# Patient Record
Sex: FEMALE | Race: WHITE | NOT HISPANIC OR LATINO | Employment: PART TIME | URBAN - NONMETROPOLITAN AREA
[De-identification: names, ages, dates, MRNs, and addresses within clinical notes are randomized per-mention and may not be internally consistent; named-entity substitution may affect disease eponyms.]

---

## 2018-05-14 ENCOUNTER — OFFICE VISIT CONVERTED (OUTPATIENT)
Dept: FAMILY MEDICINE CLINIC | Age: 33
End: 2018-05-14
Attending: NURSE PRACTITIONER

## 2018-07-16 ENCOUNTER — OFFICE VISIT CONVERTED (OUTPATIENT)
Dept: FAMILY MEDICINE CLINIC | Age: 33
End: 2018-07-16
Attending: NURSE PRACTITIONER

## 2018-08-08 ENCOUNTER — OFFICE VISIT CONVERTED (OUTPATIENT)
Dept: FAMILY MEDICINE CLINIC | Age: 33
End: 2018-08-08
Attending: NURSE PRACTITIONER

## 2018-08-15 ENCOUNTER — CONVERSION ENCOUNTER (OUTPATIENT)
Dept: OTHER | Facility: HOSPITAL | Age: 33
End: 2018-08-15

## 2019-01-18 ENCOUNTER — OFFICE VISIT CONVERTED (OUTPATIENT)
Dept: FAMILY MEDICINE CLINIC | Age: 34
End: 2019-01-18
Attending: NURSE PRACTITIONER

## 2019-01-18 ENCOUNTER — HOSPITAL ENCOUNTER (OUTPATIENT)
Dept: OTHER | Facility: HOSPITAL | Age: 34
Discharge: HOME | End: 2019-01-18
Attending: NURSE PRACTITIONER

## 2019-01-18 LAB — HCG INTACT+B SERPL-ACNC: ABNORMAL M[IU]/ML (ref 0–5)

## 2019-03-11 ENCOUNTER — OFFICE VISIT CONVERTED (OUTPATIENT)
Dept: FAMILY MEDICINE CLINIC | Age: 34
End: 2019-03-11
Attending: NURSE PRACTITIONER

## 2019-04-01 ENCOUNTER — HOSPITAL ENCOUNTER (OUTPATIENT)
Dept: OTHER | Facility: HOSPITAL | Age: 34
Discharge: HOME | End: 2019-04-01
Attending: NURSE PRACTITIONER

## 2019-04-01 ENCOUNTER — OFFICE VISIT CONVERTED (OUTPATIENT)
Dept: FAMILY MEDICINE CLINIC | Age: 34
End: 2019-04-01
Attending: NURSE PRACTITIONER

## 2019-04-01 LAB
ALBUMIN SERPL-MCNC: 4.5 G/DL (ref 3.5–5)
ALBUMIN/GLOB SERPL: 1.3 {RATIO} (ref 1.4–2.6)
ALP SERPL-CCNC: 63 U/L (ref 42–98)
ALT SERPL-CCNC: 16 U/L (ref 10–40)
ANION GAP SERPL CALC-SCNC: 19 MMOL/L (ref 8–19)
AST SERPL-CCNC: 15 U/L (ref 15–50)
BILIRUB SERPL-MCNC: 0.34 MG/DL (ref 0.2–1.3)
BUN SERPL-MCNC: 11 MG/DL (ref 5–25)
BUN/CREAT SERPL: 17 {RATIO} (ref 6–20)
CALCIUM SERPL-MCNC: 9.3 MG/DL (ref 8.7–10.4)
CHLORIDE SERPL-SCNC: 101 MMOL/L (ref 99–111)
CONV CO2: 24 MMOL/L (ref 22–32)
CONV TOTAL PROTEIN: 7.9 G/DL (ref 6.3–8.2)
CREAT UR-MCNC: 0.64 MG/DL (ref 0.5–0.9)
GFR SERPLBLD BASED ON 1.73 SQ M-ARVRAT: >60 ML/MIN/{1.73_M2}
GLOBULIN UR ELPH-MCNC: 3.4 G/DL (ref 2–3.5)
GLUCOSE SERPL-MCNC: 86 MG/DL (ref 65–99)
OSMOLALITY SERPL CALC.SUM OF ELEC: 289 MOSM/KG (ref 273–304)
POTASSIUM SERPL-SCNC: 4.2 MMOL/L (ref 3.5–5.3)
SODIUM SERPL-SCNC: 140 MMOL/L (ref 135–147)
TSH SERPL-ACNC: 1.45 M[IU]/L (ref 0.27–4.2)

## 2019-05-14 ENCOUNTER — HOSPITAL ENCOUNTER (OUTPATIENT)
Dept: OTHER | Facility: HOSPITAL | Age: 34
Discharge: HOME | End: 2019-05-14
Attending: FAMILY MEDICINE

## 2019-05-14 ENCOUNTER — OFFICE VISIT CONVERTED (OUTPATIENT)
Dept: FAMILY MEDICINE CLINIC | Age: 34
End: 2019-05-14
Attending: FAMILY MEDICINE

## 2019-05-16 LAB — BACTERIA SPEC AEROBE CULT: NORMAL

## 2019-10-01 ENCOUNTER — OFFICE VISIT CONVERTED (OUTPATIENT)
Dept: FAMILY MEDICINE CLINIC | Age: 34
End: 2019-10-01
Attending: NURSE PRACTITIONER

## 2019-11-19 ENCOUNTER — OFFICE VISIT CONVERTED (OUTPATIENT)
Dept: FAMILY MEDICINE CLINIC | Age: 34
End: 2019-11-19
Attending: NURSE PRACTITIONER

## 2020-01-28 ENCOUNTER — OFFICE VISIT CONVERTED (OUTPATIENT)
Dept: FAMILY MEDICINE CLINIC | Age: 35
End: 2020-01-28
Attending: NURSE PRACTITIONER

## 2020-03-10 ENCOUNTER — OFFICE VISIT CONVERTED (OUTPATIENT)
Dept: FAMILY MEDICINE CLINIC | Age: 35
End: 2020-03-10
Attending: NURSE PRACTITIONER

## 2020-03-10 ENCOUNTER — HOSPITAL ENCOUNTER (OUTPATIENT)
Dept: OTHER | Facility: HOSPITAL | Age: 35
Discharge: HOME | End: 2020-03-10
Attending: NURSE PRACTITIONER

## 2020-03-10 LAB
APPEARANCE UR: ABNORMAL
BACTERIA UR CULT: NORMAL
BACTERIA UR QL AUTO: ABNORMAL
BILIRUB UR QL: NEGATIVE
CASTS URNS QL MICRO: ABNORMAL /[LPF]
COLOR UR: YELLOW
CONV LEUKOCYTE ESTERASE: ABNORMAL
CONV UROBILINOGEN IN URINE BY AUTOMATED TEST STRIP: 0.2 {EHRLICHU}/DL (ref 0.1–1)
EPI CELLS #/AREA URNS HPF: ABNORMAL /[HPF]
GLUCOSE 24H UR-MCNC: NEGATIVE MG/DL
HGB UR QL STRIP: ABNORMAL
KETONES UR QL STRIP: NEGATIVE MG/DL
MUCOUS THREADS URNS QL MICRO: ABNORMAL
NITRITE UR-MCNC: NEGATIVE MG/ML
PH UR STRIP.AUTO: 7 [PH] (ref 5–8)
PROT UR-MCNC: NEGATIVE MG/DL
RBC # BLD AUTO: ABNORMAL /[HPF]
SP GR UR STRIP: 1.01 (ref 1–1.03)
SPECIMEN SOURCE: ABNORMAL
UNIDENT CRYS URNS QL MICRO: ABNORMAL /[HPF]
WBC #/AREA URNS HPF: ABNORMAL /[HPF]

## 2020-03-12 LAB — BACTERIA UR CULT: NORMAL

## 2020-08-07 ENCOUNTER — OFFICE VISIT CONVERTED (OUTPATIENT)
Dept: FAMILY MEDICINE CLINIC | Age: 35
End: 2020-08-07
Attending: FAMILY MEDICINE

## 2020-08-31 ENCOUNTER — OFFICE VISIT CONVERTED (OUTPATIENT)
Dept: FAMILY MEDICINE CLINIC | Age: 35
End: 2020-08-31
Attending: NURSE PRACTITIONER

## 2020-10-28 ENCOUNTER — OFFICE VISIT CONVERTED (OUTPATIENT)
Dept: FAMILY MEDICINE CLINIC | Age: 35
End: 2020-10-28
Attending: NURSE PRACTITIONER

## 2020-11-20 ENCOUNTER — OFFICE VISIT CONVERTED (OUTPATIENT)
Dept: FAMILY MEDICINE CLINIC | Age: 35
End: 2020-11-20
Attending: NURSE PRACTITIONER

## 2020-11-20 ENCOUNTER — HOSPITAL ENCOUNTER (OUTPATIENT)
Dept: OTHER | Facility: HOSPITAL | Age: 35
Discharge: HOME | End: 2020-11-20
Attending: NURSE PRACTITIONER

## 2020-11-20 LAB
25(OH)D3 SERPL-MCNC: 38.6 NG/ML (ref 30–100)
ALBUMIN SERPL-MCNC: 4.6 G/DL (ref 3.5–5)
ALBUMIN/GLOB SERPL: 1.5 {RATIO} (ref 1.4–2.6)
ALP SERPL-CCNC: 54 U/L (ref 42–98)
ALT SERPL-CCNC: 17 U/L (ref 10–40)
ANION GAP SERPL CALC-SCNC: 14 MMOL/L (ref 8–19)
AST SERPL-CCNC: 12 U/L (ref 15–50)
BASOPHILS # BLD AUTO: 0.03 10*3/UL (ref 0–0.2)
BASOPHILS NFR BLD AUTO: 0.4 % (ref 0–3)
BILIRUB SERPL-MCNC: 0.52 MG/DL (ref 0.2–1.3)
BUN SERPL-MCNC: 15 MG/DL (ref 5–25)
BUN/CREAT SERPL: 22 {RATIO} (ref 6–20)
CALCIUM SERPL-MCNC: 9.8 MG/DL (ref 8.7–10.4)
CHLORIDE SERPL-SCNC: 104 MMOL/L (ref 99–111)
CHOLEST SERPL-MCNC: 173 MG/DL (ref 107–200)
CHOLEST/HDLC SERPL: 5.2 {RATIO} (ref 3–6)
CONV ABS IMM GRAN: 0.03 10*3/UL (ref 0–0.2)
CONV CO2: 24 MMOL/L (ref 22–32)
CONV IMMATURE GRAN: 0.4 % (ref 0–1.8)
CONV TOTAL PROTEIN: 7.7 G/DL (ref 6.3–8.2)
CREAT UR-MCNC: 0.68 MG/DL (ref 0.5–0.9)
DEPRECATED RDW RBC AUTO: 38.5 FL (ref 36.4–46.3)
EOSINOPHIL # BLD AUTO: 0.11 10*3/UL (ref 0–0.7)
EOSINOPHIL # BLD AUTO: 1.4 % (ref 0–7)
ERYTHROCYTE [DISTWIDTH] IN BLOOD BY AUTOMATED COUNT: 12.4 % (ref 11.7–14.4)
GFR SERPLBLD BASED ON 1.73 SQ M-ARVRAT: >60 ML/MIN/{1.73_M2}
GLOBULIN UR ELPH-MCNC: 3.1 G/DL (ref 2–3.5)
GLUCOSE SERPL-MCNC: 92 MG/DL (ref 65–99)
HCT VFR BLD AUTO: 38 % (ref 37–47)
HDLC SERPL-MCNC: 33 MG/DL (ref 40–60)
HGB BLD-MCNC: 12.9 G/DL (ref 12–16)
LDLC SERPL CALC-MCNC: 113 MG/DL (ref 70–100)
LYMPHOCYTES # BLD AUTO: 2.17 10*3/UL (ref 1–5)
LYMPHOCYTES NFR BLD AUTO: 27.4 % (ref 20–45)
MCH RBC QN AUTO: 29.2 PG (ref 27–31)
MCHC RBC AUTO-ENTMCNC: 33.9 G/DL (ref 33–37)
MCV RBC AUTO: 86 FL (ref 81–99)
MONOCYTES # BLD AUTO: 0.52 10*3/UL (ref 0.2–1.2)
MONOCYTES NFR BLD AUTO: 6.6 % (ref 3–10)
NEUTROPHILS # BLD AUTO: 5.06 10*3/UL (ref 2–8)
NEUTROPHILS NFR BLD AUTO: 63.8 % (ref 30–85)
NRBC CBCN: 0 % (ref 0–0.7)
OSMOLALITY SERPL CALC.SUM OF ELEC: 286 MOSM/KG (ref 273–304)
PLATELET # BLD AUTO: 240 10*3/UL (ref 130–400)
PMV BLD AUTO: 10.9 FL (ref 9.4–12.3)
POTASSIUM SERPL-SCNC: 3.8 MMOL/L (ref 3.5–5.3)
RBC # BLD AUTO: 4.42 10*6/UL (ref 4.2–5.4)
SODIUM SERPL-SCNC: 138 MMOL/L (ref 135–147)
TRIGL SERPL-MCNC: 137 MG/DL (ref 40–150)
TSH SERPL-ACNC: 1.7 M[IU]/L (ref 0.27–4.2)
VIT B12 SERPL-MCNC: 331 PG/ML (ref 211–911)
VLDLC SERPL-MCNC: 27 MG/DL (ref 5–37)
WBC # BLD AUTO: 7.92 10*3/UL (ref 4.8–10.8)

## 2020-11-23 LAB
CONV EBV EARLY ANTIGEN: <5 U/ML (ref 0–10.9)
CONV EBV NUCLEAR ANTIGEN: 308 U/ML (ref 0–21.9)
CONV EPSTEIN BARR VIRAL CAPSID IGM: <10 U/ML (ref 0–43.9)
CONV EPSTEIN BARR VIRUS ANTIBODY TO VIRAL CAPSID IGG: >750 U/ML (ref 0–21.9)

## 2020-12-15 ENCOUNTER — OFFICE VISIT CONVERTED (OUTPATIENT)
Dept: FAMILY MEDICINE CLINIC | Age: 35
End: 2020-12-15
Attending: NURSE PRACTITIONER

## 2020-12-18 ENCOUNTER — HOSPITAL ENCOUNTER (OUTPATIENT)
Dept: OTHER | Facility: HOSPITAL | Age: 35
Discharge: HOME | End: 2020-12-18
Attending: NURSE PRACTITIONER

## 2020-12-29 ENCOUNTER — HOSPITAL ENCOUNTER (OUTPATIENT)
Dept: OTHER | Facility: HOSPITAL | Age: 35
Discharge: HOME | End: 2020-12-29
Attending: NURSE PRACTITIONER

## 2020-12-31 ENCOUNTER — OFFICE VISIT CONVERTED (OUTPATIENT)
Dept: OTOLARYNGOLOGY | Facility: CLINIC | Age: 35
End: 2020-12-31
Attending: OTOLARYNGOLOGY

## 2021-01-04 ENCOUNTER — OFFICE VISIT CONVERTED (OUTPATIENT)
Dept: FAMILY MEDICINE CLINIC | Age: 36
End: 2021-01-04
Attending: NURSE PRACTITIONER

## 2021-01-04 ENCOUNTER — HOSPITAL ENCOUNTER (OUTPATIENT)
Dept: OTHER | Facility: HOSPITAL | Age: 36
Discharge: HOME | End: 2021-01-04
Attending: NURSE PRACTITIONER

## 2021-01-04 LAB
ALBUMIN SERPL-MCNC: 4.8 G/DL (ref 3.5–5)
ALBUMIN/GLOB SERPL: 1.7 {RATIO} (ref 1.4–2.6)
ALP SERPL-CCNC: 65 U/L (ref 42–98)
ALT SERPL-CCNC: 11 U/L (ref 10–40)
ANION GAP SERPL CALC-SCNC: 16 MMOL/L (ref 8–19)
AST SERPL-CCNC: 12 U/L (ref 15–50)
BASOPHILS # BLD AUTO: 0.04 10*3/UL (ref 0–0.2)
BASOPHILS NFR BLD AUTO: 0.4 % (ref 0–3)
BILIRUB SERPL-MCNC: 0.44 MG/DL (ref 0.2–1.3)
BUN SERPL-MCNC: 10 MG/DL (ref 5–25)
BUN/CREAT SERPL: 15 {RATIO} (ref 6–20)
CALCIUM SERPL-MCNC: 9.7 MG/DL (ref 8.7–10.4)
CHLORIDE SERPL-SCNC: 100 MMOL/L (ref 99–111)
CONV ABS IMM GRAN: 0.03 10*3/UL (ref 0–0.2)
CONV CO2: 27 MMOL/L (ref 22–32)
CONV IMMATURE GRAN: 0.3 % (ref 0–1.8)
CONV TOTAL PROTEIN: 7.7 G/DL (ref 6.3–8.2)
CREAT UR-MCNC: 0.67 MG/DL (ref 0.5–0.9)
D DIMER PPP FEU-MCNC: 0.19 MG/L (ref 0–0.59)
DEPRECATED RDW RBC AUTO: 38.7 FL (ref 36.4–46.3)
EOSINOPHIL # BLD AUTO: 0.08 10*3/UL (ref 0–0.7)
EOSINOPHIL # BLD AUTO: 0.8 % (ref 0–7)
ERYTHROCYTE [DISTWIDTH] IN BLOOD BY AUTOMATED COUNT: 12.3 % (ref 11.7–14.4)
GFR SERPLBLD BASED ON 1.73 SQ M-ARVRAT: >60 ML/MIN/{1.73_M2}
GLOBULIN UR ELPH-MCNC: 2.9 G/DL (ref 2–3.5)
GLUCOSE SERPL-MCNC: 85 MG/DL (ref 65–99)
HCT VFR BLD AUTO: 39.7 % (ref 37–47)
HGB BLD-MCNC: 13.4 G/DL (ref 12–16)
LYMPHOCYTES # BLD AUTO: 2.43 10*3/UL (ref 1–5)
LYMPHOCYTES NFR BLD AUTO: 25.3 % (ref 20–45)
MCH RBC QN AUTO: 29.1 PG (ref 27–31)
MCHC RBC AUTO-ENTMCNC: 33.8 G/DL (ref 33–37)
MCV RBC AUTO: 86.1 FL (ref 81–99)
MONOCYTES # BLD AUTO: 0.66 10*3/UL (ref 0.2–1.2)
MONOCYTES NFR BLD AUTO: 6.9 % (ref 3–10)
NEUTROPHILS # BLD AUTO: 6.36 10*3/UL (ref 2–8)
NEUTROPHILS NFR BLD AUTO: 66.3 % (ref 30–85)
NRBC CBCN: 0 % (ref 0–0.7)
OSMOLALITY SERPL CALC.SUM OF ELEC: 286 MOSM/KG (ref 273–304)
PLATELET # BLD AUTO: 253 10*3/UL (ref 130–400)
PMV BLD AUTO: 10.8 FL (ref 9.4–12.3)
POTASSIUM SERPL-SCNC: 3.8 MMOL/L (ref 3.5–5.3)
RBC # BLD AUTO: 4.61 10*6/UL (ref 4.2–5.4)
SODIUM SERPL-SCNC: 139 MMOL/L (ref 135–147)
WBC # BLD AUTO: 9.6 10*3/UL (ref 4.8–10.8)

## 2021-01-05 ENCOUNTER — HOSPITAL ENCOUNTER (OUTPATIENT)
Dept: OTHER | Facility: HOSPITAL | Age: 36
Discharge: HOME | End: 2021-01-05
Attending: NURSE PRACTITIONER

## 2021-01-12 ENCOUNTER — OFFICE VISIT CONVERTED (OUTPATIENT)
Dept: FAMILY MEDICINE CLINIC | Age: 36
End: 2021-01-12
Attending: FAMILY MEDICINE

## 2021-02-08 ENCOUNTER — LAB (OUTPATIENT)
Dept: LAB | Facility: HOSPITAL | Age: 36
End: 2021-02-08

## 2021-02-08 ENCOUNTER — OFFICE VISIT (OUTPATIENT)
Dept: INTERNAL MEDICINE | Facility: CLINIC | Age: 36
End: 2021-02-08

## 2021-02-08 DIAGNOSIS — I10 BENIGN ESSENTIAL HYPERTENSION: ICD-10-CM

## 2021-02-08 DIAGNOSIS — M25.50 ARTHRALGIA OF MULTIPLE SITES: Primary | ICD-10-CM

## 2021-02-08 DIAGNOSIS — E66.3 OVERWEIGHT WITH BODY MASS INDEX (BMI) OF 29 TO 29.9 IN ADULT: ICD-10-CM

## 2021-02-08 DIAGNOSIS — E04.1 THYROID CYST: Chronic | ICD-10-CM

## 2021-02-08 DIAGNOSIS — U07.1 COVID-19 VIRUS INFECTION: ICD-10-CM

## 2021-02-08 DIAGNOSIS — Z00.00 ENCOUNTER FOR MEDICAL EXAMINATION TO ESTABLISH CARE: ICD-10-CM

## 2021-02-08 DIAGNOSIS — M54.2 CERVICALGIA: Chronic | ICD-10-CM

## 2021-02-08 DIAGNOSIS — O24.410 DIET CONTROLLED GESTATIONAL DIABETES MELLITUS (GDM), ANTEPARTUM: ICD-10-CM

## 2021-02-08 DIAGNOSIS — M25.50 ARTHRALGIA OF MULTIPLE SITES: ICD-10-CM

## 2021-02-08 LAB — HBA1C MFR BLD: 5.3 % (ref 4.8–5.6)

## 2021-02-08 PROCEDURE — 86038 ANTINUCLEAR ANTIBODIES: CPT

## 2021-02-08 PROCEDURE — 84550 ASSAY OF BLOOD/URIC ACID: CPT

## 2021-02-08 PROCEDURE — 36415 COLL VENOUS BLD VENIPUNCTURE: CPT

## 2021-02-08 PROCEDURE — 86376 MICROSOMAL ANTIBODY EACH: CPT

## 2021-02-08 PROCEDURE — 82306 VITAMIN D 25 HYDROXY: CPT

## 2021-02-08 PROCEDURE — 86431 RHEUMATOID FACTOR QUANT: CPT

## 2021-02-08 PROCEDURE — 99205 OFFICE O/P NEW HI 60 MIN: CPT | Performed by: INTERNAL MEDICINE

## 2021-02-08 PROCEDURE — 86618 LYME DISEASE ANTIBODY: CPT

## 2021-02-08 PROCEDURE — 86747 PARVOVIRUS ANTIBODY: CPT

## 2021-02-08 PROCEDURE — 86769 SARS-COV-2 COVID-19 ANTIBODY: CPT

## 2021-02-08 PROCEDURE — 85652 RBC SED RATE AUTOMATED: CPT

## 2021-02-08 PROCEDURE — 83036 HEMOGLOBIN GLYCOSYLATED A1C: CPT

## 2021-02-08 RX ORDER — HYDROCHLOROTHIAZIDE 25 MG/1
1 TABLET ORAL DAILY
COMMUNITY
Start: 2020-12-04 | End: 2021-08-30 | Stop reason: SDUPTHER

## 2021-02-08 RX ORDER — IBUPROFEN 800 MG/1
TABLET ORAL AS NEEDED
COMMUNITY
Start: 2021-01-21 | End: 2021-11-18 | Stop reason: SDUPTHER

## 2021-02-08 RX ORDER — CYCLOBENZAPRINE HCL 5 MG
TABLET ORAL AS NEEDED
COMMUNITY
Start: 2021-01-22 | End: 2021-11-02

## 2021-02-08 NOTE — PROGRESS NOTES
"Ayesha Patel  1985  [id number]  Patient Care Team:  Monserrat Reina MD as PCP - Internal Medicine (Internal Medicine)    Ayesha Patel is a 35 y.o. here today to establish care.      Previously under the care of a doctor in [place].    Chief Complaint   Patient presents with   • Establish Care   • Pain     Has \"achy, sharp\" pain all over her body off and on since having COVID in October     HPI  States she had Covid in October for couple weeks-sinus pressure, loss of taste and smell,headache, myalgias and arthralgias and some fatigue then got over it. Few weeks later she felt bad again and had \"knot\" on L neck and felt lymph nodes were swollen. US showed small LNs. Also showed cyst on thyroid and saw ENT in [place].  Work-up reportedly benign.  She is concerned because she has a friend with lymphoma that was diagnosed from a  \"knot\" on shoulder.     Started having worse bilateral neck pain in December that started posteriorly and radiated anteriorly then down to shoulders and uppse arms.  Now pain \"extends to her whole body.\"  She states that she has had neck pain for many years.  Saw oncologist Dr. Menjivar in [place]. Had CT that showed 2 little lymph nodes  In lungs but nothing else.  He did an extensive lab work-up which was all normal.    Diffuse arthralgias -mainly posterior neck/cjym3dehyi/collar bone/shins/ankles/wrists--now waxes  and wanes but  always there.  Only helped by hot bath or moist heat packs.  He also finds Flexeril helpful when the pain is worse.    HPI:   BPs at home 136/86 to 142/90 with wrist cuff at home. Takes HCTZ for years. She reports being on lisinopril in the past which caused intususseption about 10 years ago. .     HPI  Long history of neck pain and had PT for it in the past. Flexeril helps some, but only uses it occasionally for severe pain.       Past Medical History:   Diagnosis Date   • Hypertension      History reviewed. No pertinent surgical " "history.  Family History   Problem Relation Age of Onset   • Hypertension Mother    • Heart disease Maternal Uncle    • Heart disease Maternal Grandmother    • Prostate cancer Paternal Grandfather         prostate   • Hyperlipidemia Father    • Prostate cancer Paternal Uncle         several P.Uncles   • Rheum arthritis Paternal Aunt         and giant cell arteritis   • Prostate cancer Paternal Uncle      Social History     Tobacco Use   Smoking Status Never Smoker   Smokeless Tobacco Never Used     Allergies   Allergen Reactions   • Lisinopril GI Intolerance     \"flipped her intestines inside out and hospitalized her\"       Current Outpatient Medications:   •  cyclobenzaprine (FLEXERIL) 5 MG tablet, As Needed., Disp: , Rfl:   •  hydroCHLOROthiazide (HYDRODIURIL) 25 MG tablet, Take 1 tablet by mouth Daily., Disp: , Rfl:   •  ibuprofen (ADVIL,MOTRIN) 800 MG tablet, As Needed., Disp: , Rfl:   •  cholecalciferol (Vitamin D, Cholecalciferol,) 25 MCG (1000 UT) tablet, Take 1 tablet by mouth Daily., Disp: 90 tablet, Rfl: 3  •  Pediatric Multivitamins-Iron (Flintstones w/Iron) 18 MG chewable tablet, Chew 2 tablets Daily., Disp: 180 tablet, Rfl: 3  •  vitamin B-12 (CYANOCOBALAMIN) 1000 MCG tablet, Take 1 tablet by mouth Daily., Disp: 90 tablet, Rfl: 1    Review of Systems   Constitutional: Negative for chills, fatigue, fever, unexpected weight gain and unexpected weight loss.   HENT: Negative for sore throat and trouble swallowing.    Eyes: Negative for visual disturbance.   Respiratory: Negative for cough, shortness of breath and wheezing.    Cardiovascular: Negative for chest pain, palpitations and leg swelling.   Gastrointestinal: Negative for abdominal pain, blood in stool, constipation and diarrhea.   Endocrine: Negative for cold intolerance and heat intolerance.   Genitourinary: Negative for dysuria, frequency and urinary incontinence.   Musculoskeletal: Positive for arthralgias and neck pain. Negative for back pain, " "gait problem and joint swelling.   Skin: Negative for rash and skin lesions.   Allergic/Immunologic: Negative for immunocompromised state.   Neurological: Negative for dizziness, weakness, numbness and headache.   Hematological: Negative for adenopathy. Does not bruise/bleed easily.   Psychiatric/Behavioral: Negative for sleep disturbance, suicidal ideas and depressed mood. The patient is not nervous/anxious.        /90   Pulse 94   Temp 97.7 °F (36.5 °C) (Infrared)   Ht 166.4 cm (65.5\")   Wt 82.9 kg (182 lb 12.8 oz)   Breastfeeding No   BMI 29.96 kg/m²          Physical Exam  Vitals signs and nursing note reviewed.   Constitutional:       Appearance: She is well-developed and overweight.       HENT:      Head: Normocephalic.   Eyes:      Conjunctiva/sclera: Conjunctivae normal.      Pupils: Pupils are equal, round, and reactive to light.   Neck:      Musculoskeletal: Normal range of motion and neck supple.      Thyroid: No thyromegaly.      Vascular: No carotid bruit.   Cardiovascular:      Rate and Rhythm: Normal rate and regular rhythm.      Heart sounds: Normal heart sounds.   Pulmonary:      Effort: Pulmonary effort is normal.      Breath sounds: Normal breath sounds. No wheezing.   Abdominal:      General: Bowel sounds are normal.      Palpations: Abdomen is soft.      Tenderness: There is no abdominal tenderness.   Musculoskeletal: Normal range of motion.      Cervical back: She exhibits tenderness, deformity and spasm.        Back:    Lymphadenopathy:      Head:      Right side of head: No submental, submandibular, tonsillar, preauricular, posterior auricular or occipital adenopathy.      Left side of head: No submental, submandibular, tonsillar, preauricular, posterior auricular or occipital adenopathy.      Cervical: No cervical adenopathy.      Upper Body:      Right upper body: No supraclavicular adenopathy.      Left upper body: No supraclavicular adenopathy.   Skin:     General: Skin is " warm and dry.      Findings: No rash.   Neurological:      Mental Status: She is alert and oriented to person, place, and time.      Cranial Nerves: No cranial nerve deficit.      Sensory: No sensory deficit.      Coordination: Coordination normal.      Gait: Gait normal.   Psychiatric:         Attention and Perception: Attention normal.         Mood and Affect: Mood and affect normal.         Speech: Speech normal.         Behavior: Behavior normal.         Thought Content: Thought content normal.         Cognition and Memory: Cognition normal.         Judgment: Judgment normal.         Results Review:  I reviewed the patient's other test results and agree with the interpretation I reviewed the patient's labs that she brought with her in detail.  Extensive work-up has been done including thyroid, EBV, serum protein electrophoresis, D-dimer, hepatitis panel.    All labs were normal except for LDL at 113 and HDL at 33.  Ferritin level was a bit low at 42.  B12 was in the lower part of the normal range at 305.    Assessment/Plan:  Patient Instructions   Problem List Items Addressed This Visit        Cardiac and Vasculature    Benign essential hypertension    Overview     2/11/2021 Monserrat Reina MD    Continue hydrochlorothiazide daily for now.  She will check blood pressures at home at different times of the day and record the values to bring to the next appointment.    Decrease salt in the diet.  Avoid sodas.  Increase regular exercise.         Relevant Medications    hydroCHLOROthiazide (HYDRODIURIL) 25 MG tablet       Endocrine and Metabolic    Thyroid cyst (Chronic)    Overview     2/11/2021 Monserrat Reina MD    Evaluated by an ENT doctor in [place] and found to be benign.  Ultrasound revealed 0.5 cm colloid cyst.    We will continue to follow thyroid labs.         Diet controlled gestational diabetes mellitus (GDM), antepartum    Overview     2/11/2021 Monserrat Reina MD    We will monitor blood  sugars closely.  Check A1c today.    Losing weight and exercising regularly will help prevent development of type 2 diabetes.            Musculoskeletal and Injuries    Cervicalgia (Chronic)    Overview     2/8/2021 Monserrat Reina MD    Past C-spine x-ray revealed degenerative disc disease at C5-6.    Patient was advised on doing neck stretches and exercises throughout the day.  Practice good posture.  Use moist heat to relax tight muscles.    Consider referral to chiropractor and/or physical therapy.         Arthralgia of multiple sites - Primary    Overview     2/11/2021 Monserrat Reina MD    Extensive work-up by previous doctor was unrevealing.    Check additional labs today including Parvovirus B19, rheumatoid factor, MONTRELL, Lyme disease titers, sed rate.    May continue Flexeril and ibuprofen as needed.  Take ibuprofen with food to prevent GI upset.    Increase regular exercise and physical activity.         Relevant Orders    Thyroid Peroxidase Antibody (Completed)    Sedimentation Rate (Completed)    Vitamin D 25 Hydroxy (Completed)    Uric Acid (Completed)    MONTRELL With / DsDNA, RNP, Sjogrens A / B, Hawk (Completed)    Rheumatoid Factor (Completed)    Parvovirus B19 Antibody, IgG & IgM    Lyme, Total Antibody Test / Reflex (Completed)       Other    COVID-19 virus infection    Overview     2/11/2021 Monserrat Reina MD    Presumed COVID-19 infection in October.  Her  was ill and tested positive.  She was never tested but had some typical symptoms including headache, myalgias and arthralgias, fatigue, loss of sense of taste and smell.    Check Covid antibodies today..         Relevant Orders    SARS-CoV-2 Antibody, IgM (Completed)    Overweight with body mass index (BMI) of 29 to 29.9 in adult    Overview     2/11/2021 Monserrat Reina MD    Decrease sugars and fats and snack foods in the diet.  Smaller portions at mealtime.    Increase regular exercise and physical activity.    Weigh once a week   to monitor progress.         Relevant Orders    Hemoglobin A1c (Completed)      Other Visit Diagnoses     Encounter for medical examination to establish care                 Diagnosis Plan   1. Arthralgia of multiple sites  Thyroid Peroxidase Antibody    Sedimentation Rate    Vitamin D 25 Hydroxy    Uric Acid    MONTRELL With / DsDNA, RNP, Sjogrens A / B, Smith    Rheumatoid Factor    Parvovirus B19 Antibody, IgG & IgM    Lyme, Total Antibody Test / Reflex   2. Cervicalgia     3. Benign essential hypertension     4. Overweight with body mass index (BMI) of 29 to 29.9 in adult  Hemoglobin A1c   5. Thyroid cyst     6. Diet controlled gestational diabetes mellitus (GDM), antepartum     7. COVID-19 virus infection  SARS-CoV-2 Antibody, IgM   8. Encounter for medical examination to establish care         Patient Instructions     Patient Instructions   Problem List Items Addressed This Visit        Cardiac and Vasculature    Benign essential hypertension    Overview     2/11/2021 Monserrat Reina MD    Continue hydrochlorothiazide daily for now.  She will check blood pressures at home at different times of the day and record the values to bring to the next appointment.    Decrease salt in the diet.  Avoid sodas.  Increase regular exercise.         Relevant Medications    hydroCHLOROthiazide (HYDRODIURIL) 25 MG tablet       Endocrine and Metabolic    Thyroid cyst (Chronic)    Overview     2/11/2021 Monserrat Reina MD    Evaluated by an ENT doctor in [place] and found to be benign.  Ultrasound revealed 0.5 cm colloid cyst.    We will continue to follow thyroid labs.         Diet controlled gestational diabetes mellitus (GDM), antepartum    Overview     2/11/2021 Monserrat Reina MD    We will monitor blood sugars closely.  Check A1c today.    Losing weight and exercising regularly will help prevent development of type 2 diabetes.            Musculoskeletal and Injuries    Cervicalgia (Chronic)    Overview     2/8/2021  Monserrat Reina MD    Past C-spine x-ray revealed degenerative disc disease at C5-6.    Patient was advised on doing neck stretches and exercises throughout the day.  Practice good posture.  Use moist heat to relax tight muscles.    Consider referral to chiropractor and/or physical therapy.         Arthralgia of multiple sites - Primary    Overview     2/11/2021 Monserrat Reina MD    Extensive work-up by previous doctor was unrevealing.    Check additional labs today including Parvovirus B19, rheumatoid factor, MONTRELL, Lyme disease titers, sed rate.    May continue Flexeril and ibuprofen as needed.  Take ibuprofen with food to prevent GI upset.    Increase regular exercise and physical activity.         Relevant Orders    Thyroid Peroxidase Antibody (Completed)    Sedimentation Rate (Completed)    Vitamin D 25 Hydroxy (Completed)    Uric Acid (Completed)    MONTRELL With / DsDNA, RNP, Sjogrens A / B, Hawk (Completed)    Rheumatoid Factor (Completed)    Parvovirus B19 Antibody, IgG & IgM    Lyme, Total Antibody Test / Reflex (Completed)       Other    COVID-19 virus infection    Overview     2/11/2021 Monserrat Reina MD    Presumed COVID-19 infection in October.  Her  was ill and tested positive.  She was never tested but had some typical symptoms including headache, myalgias and arthralgias, fatigue, loss of sense of taste and smell.    Check Covid antibodies today..         Relevant Orders    SARS-CoV-2 Antibody, IgM (Completed)    Overweight with body mass index (BMI) of 29 to 29.9 in adult    Overview     2/11/2021 Monserrat Reina MD    Decrease sugars and fats and snack foods in the diet.  Smaller portions at mealtime.    Increase regular exercise and physical activity.    Weigh once a week  to monitor progress.         Relevant Orders    Hemoglobin A1c (Completed)      Other Visit Diagnoses     Encounter for medical examination to establish care                     Screenings due include  Pap smear (she  will schedule)..  Immunizations recommended are none, she is up-to-date.  Routine labs are due none.    Plan of care reviewed with patient at the conclusion of today's visit. Education was provided regarding diagnosis and management.  Patient verbalizes understanding of and agreement with management plan.    Return in about 3 weeks (around 3/1/2021) for Recheck.     Time spent with this patient, reviewing past labs and radiology studies, counseling, ordering labs, appropriate physical exam, and documenting in the chart, was 61 minutes.    * Please note that portions of this note were completed with a voice recognition program. Efforts were made to edit the dictation but occasionally words are transcribed.     Monserrat Reina MD

## 2021-02-09 LAB
25(OH)D3 SERPL-MCNC: 45.3 NG/ML (ref 30–100)
CHROMATIN AB SERPL-ACNC: <10 IU/ML (ref 0–14)
ERYTHROCYTE [SEDIMENTATION RATE] IN BLOOD: 14 MM/HR (ref 0–20)
URATE SERPL-MCNC: 4.8 MG/DL (ref 2.4–5.7)

## 2021-02-10 LAB
ANA SER QL: NEGATIVE
B BURGDOR IGG+IGM SER-ACNC: <0.91 ISR (ref 0–0.9)
SARS-COV-2 IGM SERPL QL IA: NEGATIVE
THYROPEROXIDASE AB SERPL-ACNC: 9 IU/ML (ref 0–34)

## 2021-02-11 VITALS
DIASTOLIC BLOOD PRESSURE: 90 MMHG | BODY MASS INDEX: 29.38 KG/M2 | HEART RATE: 94 BPM | SYSTOLIC BLOOD PRESSURE: 136 MMHG | HEIGHT: 66 IN | WEIGHT: 182.8 LBS | TEMPERATURE: 97.7 F

## 2021-02-11 PROBLEM — E04.1 THYROID CYST: Chronic | Status: ACTIVE | Noted: 2021-02-11

## 2021-02-11 PROBLEM — U07.1 COVID-19 VIRUS INFECTION: Status: ACTIVE | Noted: 2021-02-11

## 2021-02-11 PROBLEM — M25.50 ARTHRALGIA OF MULTIPLE SITES: Status: ACTIVE | Noted: 2021-02-11

## 2021-02-11 PROBLEM — E66.3 OVERWEIGHT WITH BODY MASS INDEX (BMI) OF 29 TO 29.9 IN ADULT: Status: ACTIVE | Noted: 2021-02-11

## 2021-02-11 PROBLEM — O24.410 DIET CONTROLLED GESTATIONAL DIABETES MELLITUS (GDM), ANTEPARTUM: Status: ACTIVE | Noted: 2021-02-11

## 2021-02-11 LAB
B19V IGG SER IA-ACNC: 3.5 INDEX (ref 0–0.8)
B19V IGM SER IA-ACNC: 0.1 INDEX (ref 0–0.8)

## 2021-02-11 RX ORDER — MELATONIN
1000 DAILY
Qty: 90 TABLET | Refills: 3
Start: 2021-02-11 | End: 2021-11-02

## 2021-02-11 RX ORDER — PEDI MULTIVIT 17/IRON FUMARATE 15 MG
2 TABLET,CHEWABLE ORAL DAILY
Qty: 180 TABLET | Refills: 3
Start: 2021-02-11 | End: 2021-11-02

## 2021-02-11 RX ORDER — LANOLIN ALCOHOL/MO/W.PET/CERES
1000 CREAM (GRAM) TOPICAL DAILY
Qty: 90 TABLET | Refills: 1
Start: 2021-02-11 | End: 2021-11-02

## 2021-02-11 NOTE — PATIENT INSTRUCTIONS
Patient Instructions   Problem List Items Addressed This Visit        Cardiac and Vasculature    Benign essential hypertension    Overview     2/11/2021 Monserrat Reina MD    Continue hydrochlorothiazide daily for now.  She will check blood pressures at home at different times of the day and record the values to bring to the next appointment.    Decrease salt in the diet.  Avoid sodas.  Increase regular exercise.         Relevant Medications    hydroCHLOROthiazide (HYDRODIURIL) 25 MG tablet       Endocrine and Metabolic    Thyroid cyst (Chronic)    Overview     2/11/2021 Monserrat Reina MD    Evaluated by an ENT doctor in [place] and found to be benign.  Ultrasound revealed 0.5 cm colloid cyst.    We will continue to follow thyroid labs.         Diet controlled gestational diabetes mellitus (GDM), antepartum    Overview     2/11/2021 Monserrat Reina MD    We will monitor blood sugars closely.  Check A1c today.    Losing weight and exercising regularly will help prevent development of type 2 diabetes.            Musculoskeletal and Injuries    Cervicalgia (Chronic)    Overview     2/8/2021 Monserrat Reina MD    Past C-spine x-ray revealed degenerative disc disease at C5-6.    Patient was advised on doing neck stretches and exercises throughout the day.  Practice good posture.  Use moist heat to relax tight muscles.    Consider referral to chiropractor and/or physical therapy.         Arthralgia of multiple sites - Primary    Overview     2/11/2021 Monserrat Reina MD    Extensive work-up by previous doctor was unrevealing.    Check additional labs today including Parvovirus B19, rheumatoid factor, MONTRELL, Lyme disease titers, sed rate.    May continue Flexeril and ibuprofen as needed.  Take ibuprofen with food to prevent GI upset.    Increase regular exercise and physical activity.         Relevant Orders    Thyroid Peroxidase Antibody (Completed)    Sedimentation Rate (Completed)    Vitamin D 25 Hydroxy  (Completed)    Uric Acid (Completed)    MONTRELL With / DsDNA, RNP, Sjogrens A / B, Hawk (Completed)    Rheumatoid Factor (Completed)    Parvovirus B19 Antibody, IgG & IgM    Lyme, Total Antibody Test / Reflex (Completed)       Other    COVID-19 virus infection    Overview     2/11/2021 Monserrat Reina MD    Presumed COVID-19 infection in October.  Her  was ill and tested positive.  She was never tested but had some typical symptoms including headache, myalgias and arthralgias, fatigue, loss of sense of taste and smell.    Check Covid antibodies today..         Relevant Orders    SARS-CoV-2 Antibody, IgM (Completed)    Overweight with body mass index (BMI) of 29 to 29.9 in adult    Overview     2/11/2021 Monserrat Reina MD    Decrease sugars and fats and snack foods in the diet.  Smaller portions at mealtime.    Increase regular exercise and physical activity.    Weigh once a week  to monitor progress.         Relevant Orders    Hemoglobin A1c (Completed)      Other Visit Diagnoses     Encounter for medical examination to establish care                 BMI for Adults  What is BMI?  Body mass index (BMI) is a number that is calculated from a person's weight and height. BMI can help estimate how much of a person's weight is composed of fat. BMI does not measure body fat directly. Rather, it is an alternative to procedures that directly measure body fat, which can be difficult and expensive.  BMI can help identify people who may be at higher risk for certain medical problems.  What are BMI measurements used for?  BMI is used as a screening tool to identify possible weight problems. It helps determine whether a person is obese, overweight, a healthy weight, or underweight.  BMI is useful for:  · Identifying a weight problem that may be related to a medical condition or may increase the risk for medical problems.  · Promoting changes, such as changes in diet and exercise, to help reach a healthy weight. BMI  "screening can be repeated to see if these changes are working.  How is BMI calculated?  BMI involves measuring your weight in relation to your height. Both height and weight are measured, and the BMI is calculated from those numbers. This can be done either in English (U[place] or metric measurements. Note that charts and online BMI calculators are available to help you find your BMI quickly and easily without having to do these calculations yourself.  To calculate your BMI in English (U.S[place] measurements:    1. Measure your weight in pounds (lb).  2. Multiply the number of pounds by 703.  ? For example, for a person who weighs 180 lb, multiply that number by 703, which equals 126,540.  3. Measure your height in inches. Then multiply that number by itself to get a measurement called \"inches squared.\"  ? For example, for a person who is 70 inches tall, the \"inches squared\" measurement is 70 inches x 70 inches, which equals 4,900 inches squared.  4. Divide the total from step 2 (number of lb x 703) by the total from step 3 (inches squared): 126,540 ÷ 4,900 = 25.8. This is your BMI.  To calculate your BMI in metric measurements:  1. Measure your weight in kilograms (kg).  2. Measure your height in meters (m). Then multiply that number by itself to get a measurement called \"meters squared.\"  ? For example, for a person who is 1.75 m tall, the \"meters squared\" measurement is 1.75 m x 1.75 m, which is equal to 3.1 meters squared.  3. Divide the number of kilograms (your weight) by the meters squared number. In this example: 70 ÷ 3.1 = 22.6. This is your BMI.  What do the results mean?  BMI charts are used to identify whether you are underweight, normal weight, overweight, or obese. The following guidelines will be used:  · Underweight: BMI less than 18.5.  · Normal weight: BMI between 18.5 and 24.9.  · Overweight: BMI between 25 and 29.9.  · Obese: BMI of 30 or above.  Keep these notes in mind:  · Weight includes both fat and " muscle, so someone with a muscular build, such as an athlete, may have a BMI that is higher than 24.9. In cases like these, BMI is not an accurate measure of body fat.  · To determine if excess body fat is the cause of a BMI of 25 or higher, further assessments may need to be done by a health care provider.  · BMI is usually interpreted in the same way for men and women.  Where to find more information  For more information about BMI, including tools to quickly calculate your BMI, go to these websites:  · Centers for Disease Control and Prevention: www.cdc.gov  · American Heart Association: www.heart.org  · National Heart, Lung, and Blood [place]: www.nhlbi.nih.gov  Summary  · Body mass index (BMI) is a number that is calculated from a person's weight and height.  · BMI may help estimate how much of a person's weight is composed of fat. BMI can help identify those who may be at higher risk for certain medical problems.  · BMI can be measured using English measurements or metric measurements.  · BMI charts are used to identify whether you are underweight, normal weight, overweight, or obese.  This information is not intended to replace advice given to you by your health care provider. Make sure you discuss any questions you have with your health care provider.  Document Revised: 09/09/2020 Document Reviewed: 07/17/2020  [employer]vier Patient Education © 2020 [employer]

## 2021-02-14 PROBLEM — R59.1 LYMPHADENOPATHY: Status: ACTIVE | Noted: 2021-02-14

## 2021-02-23 ENCOUNTER — OFFICE VISIT CONVERTED (OUTPATIENT)
Dept: FAMILY MEDICINE CLINIC | Age: 36
End: 2021-02-23
Attending: NURSE PRACTITIONER

## 2021-03-09 ENCOUNTER — OFFICE VISIT (OUTPATIENT)
Dept: INTERNAL MEDICINE | Facility: CLINIC | Age: 36
End: 2021-03-09

## 2021-03-09 VITALS
HEART RATE: 68 BPM | DIASTOLIC BLOOD PRESSURE: 92 MMHG | WEIGHT: 182.8 LBS | HEIGHT: 66 IN | TEMPERATURE: 98.2 F | BODY MASS INDEX: 29.38 KG/M2 | SYSTOLIC BLOOD PRESSURE: 134 MMHG

## 2021-03-09 DIAGNOSIS — M54.2 CERVICALGIA: Chronic | ICD-10-CM

## 2021-03-09 DIAGNOSIS — M53.3 COCCYDYNIA: ICD-10-CM

## 2021-03-09 DIAGNOSIS — M25.50 ARTHRALGIA OF MULTIPLE SITES: ICD-10-CM

## 2021-03-09 DIAGNOSIS — E66.3 OVERWEIGHT WITH BODY MASS INDEX (BMI) OF 29 TO 29.9 IN ADULT: ICD-10-CM

## 2021-03-09 DIAGNOSIS — I10 BENIGN ESSENTIAL HYPERTENSION: Primary | ICD-10-CM

## 2021-03-09 DIAGNOSIS — N60.11 BREAST CHANGES, FIBROCYSTIC, RIGHT: Chronic | ICD-10-CM

## 2021-03-09 DIAGNOSIS — R59.1 LYMPHADENOPATHY: ICD-10-CM

## 2021-03-09 PROCEDURE — 99214 OFFICE O/P EST MOD 30 MIN: CPT | Performed by: INTERNAL MEDICINE

## 2021-03-09 RX ORDER — LOSARTAN POTASSIUM 50 MG/1
50 TABLET ORAL DAILY
Qty: 30 TABLET | Refills: 5 | Status: SHIPPED | OUTPATIENT
Start: 2021-03-09 | End: 2021-11-02

## 2021-03-09 NOTE — PROGRESS NOTES
[facility]     Ayesha Patel  1985   [id number]      Patient Care Team:  Monserrat Reina MD as PCP - Internal Medicine (Internal Medicine)    Chief Complaint   Patient presents with   • Joint Pain     f/u            HPI  Patient is a 35 y.o. female presents with severe arthralgias over several months.  Some better.  Previously the only thing she found helpful was to take warm baths and would do so about 3 times a day.  Now she states that she does not need to do that so much.    At this point the most painful areas are the back of the neck and occiput    HPI  She felt some lumpiness and general tenderness of the right lateral breast and a little bit of the left lateral breast over the last few days.  She is getting ready to start her period.  She has no more than 1 cup of caffeine per day.  That has not changed.  She also felt some lateral lower thoracic tenderness and general soreness which seems to be better today.    HPI  She also relates that 1 day a couple of weeks ago she had severe tailbone pain that was so bad she could not sleep.  She took an ibuprofen and the pain totally resolved but then it returned some the next morning.  She took another ibuprofen and then the pain was totally gone.  It has not recurred.  She denies any fall or other injury.  She denies having set on a particularly hard chair.    CHRONIC CONDITIONS  BPs running 130s-145/90-92.Takes HCT daily. Side effect with lisinopril. Doesn't remember taking anything else for BP.    She is still worried about the lymphadenopathy she had last year even though she had a CT of the neck and chest in January of this year that showed no lymphadenopathy.  There is none now on exam.    Weight is stable.  She does plan to start weight watchers diet.  She has started to increase physical activities since she is starting to feel some better.    Past Medical History:   Diagnosis Date   • Hypertension        History reviewed. No  "pertinent surgical history.    Family History   Problem Relation Age of Onset   • Hypertension Mother    • Heart disease Maternal Uncle    • Heart disease Maternal Grandmother    • Prostate cancer Paternal Grandfather         prostate   • Hyperlipidemia Father    • Prostate cancer Paternal Uncle         several P.Uncles   • Rheum arthritis Paternal Aunt         and giant cell arteritis   • Prostate cancer Paternal Uncle        Social History     Socioeconomic History   • Marital status:      Spouse name: Not on file   • Number of children: Not on file   • Years of education: Not on file   • Highest education level: Not on file   Tobacco Use   • Smoking status: Never Smoker   • Smokeless tobacco: Never Used   Vaping Use   • Vaping Use: Never used   Substance and Sexual Activity   • Alcohol use: Never   • Drug use: Never   • Sexual activity: Yes     Partners: Male     Comment:  had vasectomy       Allergies   Allergen Reactions   • Lisinopril GI Intolerance     \"flipped her intestines inside out and hospitalized her\"       Review of Systems:     Review of Systems   Constitutional: Negative for chills, fatigue and fever.   HENT: Negative for congestion, ear pain and sinus pressure.    Respiratory: Negative for cough, chest tightness, shortness of breath and wheezing.    Cardiovascular: Negative for chest pain, palpitations and leg swelling.   Gastrointestinal: Negative for abdominal pain, blood in stool and constipation.   Genitourinary: Positive for breast lump.   Musculoskeletal: Positive for arthralgias.   Skin: Negative for color change.   Allergic/Immunologic: Negative for environmental allergies.   Neurological: Negative for dizziness and headache.   Psychiatric/Behavioral: Negative for depressed mood. The patient is not nervous/anxious.        Vital Signs  Vitals:    03/09/21 1512   BP: 134/92   BP Location: Left arm   Patient Position: Sitting   Cuff Size: Adult   Pulse: 68   Temp: 98.2 °F (36.8 " "°C)   TempSrc: Infrared   Weight: 82.9 kg (182 lb 12.8 oz)   Height: 166.4 cm (65.5\")   PainSc:   3   PainLoc: Breast  Comment: bilat and sides hurt and back of head     Body mass index is 29.96 kg/m².      Current Outpatient Medications:   •  cholecalciferol (Vitamin D, Cholecalciferol,) 25 MCG (1000 UT) tablet, Take 1 tablet by mouth Daily., Disp: 90 tablet, Rfl: 3  •  cyclobenzaprine (FLEXERIL) 5 MG tablet, As Needed., Disp: , Rfl:   •  hydroCHLOROthiazide (HYDRODIURIL) 25 MG tablet, Take 1 tablet by mouth Daily., Disp: , Rfl:   •  ibuprofen (ADVIL,MOTRIN) 800 MG tablet, As Needed., Disp: , Rfl:   •  Pediatric Multivitamins-Iron (Flintstones w/Iron) 18 MG chewable tablet, Chew 2 tablets Daily., Disp: 180 tablet, Rfl: 3  •  vitamin B-12 (CYANOCOBALAMIN) 1000 MCG tablet, Take 1 tablet by mouth Daily., Disp: 90 tablet, Rfl: 1  •  losartan (COZAAR) 50 MG tablet, Take 1 tablet by mouth Daily., Disp: 30 tablet, Rfl: 5    Physical Exam:    Physical Exam  Vitals and nursing note reviewed.   Constitutional:       Appearance: She is well-developed. She is obese.   HENT:      Head: Normocephalic.   Eyes:      Conjunctiva/sclera: Conjunctivae normal.      Pupils: Pupils are equal, round, and reactive to light.   Neck:      Thyroid: No thyroid mass or thyromegaly.     Cardiovascular:      Rate and Rhythm: Normal rate and regular rhythm.      Heart sounds: Normal heart sounds.   Pulmonary:      Effort: Pulmonary effort is normal.      Breath sounds: Normal breath sounds. No wheezing.   Chest:      Breasts:         Right: Tenderness present.         Left: Normal.       Musculoskeletal:         General: Normal range of motion.      Cervical back: Normal range of motion and neck supple.      Comments: No swollen tender joints.   Lymphadenopathy:      Head:      Right side of head: No submental, submandibular, tonsillar, preauricular, posterior auricular or occipital adenopathy.      Left side of head: No submental, submandibular, " tonsillar, preauricular, posterior auricular or occipital adenopathy.      Cervical: No cervical adenopathy.      Upper Body:      Right upper body: No supraclavicular, axillary or pectoral adenopathy.      Left upper body: No supraclavicular, axillary or pectoral adenopathy.   Skin:     General: Skin is warm and dry.   Neurological:      Mental Status: She is alert and oriented to person, place, and time.   Psychiatric:         Thought Content: Thought content normal.          ACE III MINI        Results Review:    I reviewed the patient's new clinical results.  We reviewed her recent labs in detail.  CMP:     HbA1c:  Lab Results   Component Value Date    HGBA1C 5.30 02/08/2021     Microalbumin:  No results found for: MICROALBUR, POCMALB, POCCREAT  Lipid Panel  No results found for: CHOL, TRIG, HDL, LDL, AST, ALT    Medication Review: Medications reviewed and noted  Patient Instructions   Problem List Items Addressed This Visit        Cardiac and Vasculature    Benign essential hypertension - Primary    Overview     3/9/2021 Monserrat Reina MD    Start losartan 50 mg tablet daily.  Continue hydrochlorothiazide daily.    She will check blood pressures at home at different times of the day and record the values to bring to the next appointment.    Decrease salt in the diet.  Avoid sodas.  Increase regular exercise.         Relevant Medications    hydroCHLOROthiazide (HYDRODIURIL) 25 MG tablet    losartan (COZAAR) 50 MG tablet       Genitourinary and Reproductive     Breast changes, fibrocystic, right (Chronic)    Overview     3/10/2021 Monserrat Reina MD    Mild right greater than left breast tenderness.  Exam reveals fibrocystic breast changes that are rather superficial on the right upper outer quadrant.    Patient was reassured.  Patient was advised to avoid excessive caffeine and chocolate.            Musculoskeletal and Injuries    Cervicalgia (Chronic)    Overview     3/9/2021 Monserrat Reina MD    Past  C-spine x-ray revealed degenerative disc disease at C5-6.    Patient was advised on doing neck stretches and exercises throughout the day.  Practice good posture.  Use moist heat to relax tight muscles.    She has a therapeutic massage scheduled.    Consider referral to chiropractor and/or physical therapy.         Arthralgia of multiple sites    Overview     3/9/2021 Monserrat Reina MD    Extensive work-up by previous doctor was unrevealing.    Additional labs done here were all negative, including  rheumatoid factor, MONTRELL, Lyme disease titers, sed rate except for positive Parvovirus B19 IgG (normal IgM).  This would be a possible etiology of her diffuse pain which is now improving.    Fibromyalgia would also be a possible etiology although it usually presents more with diffuse muscular pain than joint pain.    May continue Flexeril and ibuprofen as needed.  Take ibuprofen with food to prevent GI upset.    We discussed increasing regular exercise and physical activity.  We recommended decreasing inflammatory foods like sugars, glutens, dairy, processed foods.            Neuro    Coccydynia    Overview     3/10/2021 Monserrat Reina MD    Acute tailbone pain that resolved after rest and ibuprofen.            Symptoms and Signs    Lymphadenopathy    Overview     Lymphadenopathy noted on exam and on ultrasound December 2020.  Benign appearance.  CTA of the neck and chest revealed no lymphadenopathy January 2021.      No lymphadenopathy on exam currently.            Other    Overweight with body mass index (BMI) of 29 to 29.9 in adult    Overview     3/10/2021 Monserrat Reina MD    Decrease sugars and fats and snack foods in the diet.  Decrease other inflammatory foods including dairy, gluten, and processed foods.  Eat smaller portions at mealtime.    Increase regular exercise and physical activity.    Weigh once a week  to monitor progress.                  Diagnosis Plan   1. Benign essential hypertension     2.  Cervicalgia     3. Breast changes, fibrocystic, right     4. Arthralgia of multiple sites     5. Overweight with body mass index (BMI) of 29 to 29.9 in adult     6. Lymphadenopathy     7. Coccydynia             Plan of care reviewed with patient at the conclusion of today's visit. Education was provided regarding diagnosis, management, and any prescribed or recommended OTC medications.Patient verbalizes understanding of and agreement with management plan.         Monserrat Reina MD

## 2021-03-10 PROBLEM — M53.3 COCCYDYNIA: Status: ACTIVE | Noted: 2021-03-10

## 2021-03-10 PROBLEM — N60.11: Chronic | Status: ACTIVE | Noted: 2021-03-10

## 2021-05-10 NOTE — H&P
History and Physical      Patient Name: Ayesha Patel   Patient ID: [id number]   Sex: Female   YOB: 1985    Primary Care Provider: Sondra ASHER   Referring Provider: Gail ASHER    Visit Date: December 31, 2020    Provider: Jose Rossi MD   Location: [facility]   Location Address: [address] [place] [address], [address]  [id number]   Location Phone: [phone]          Chief Complaint     1.  Thyroid nodules    2.  Lymphadenopathy       History Of Present Illness  Ayesha Patel is a 35 year old /White female who presents to the office today as a consult from Gail ASHER.      She presents the clinic today accompanied by her mother for evaluation of issues with thyroid nodule that were incidentally discovered on ultrasound of the thyroid as well as lymphadenopathy that she noted after being sick with Covid in October.  She notes that she has been very worried about the lymphadenopathy, and has not been eating well.  She has lost about 10 pounds during that time.  She denies any other symptoms such as fatigue, fevers, night sweats, chills, or malaise.  She has noted no lymphadenopathy elsewhere in her body.  She did have an ultrasound for work-up of this in December which shows normal-sized thyroid gland with a 0.5 cm colloid cyst on the right side.  There were several lymph nodes all about normal in size with normal fatty hilum and were thought to be reactive in nature.    Has been very worried as she has a friend that was diagnosed with lymphoma and also had lymphadenopathy.  She informs me that her lymph nodes have been decreasing in size.  She has had some left upper extremity pain and x-rays of the neck revealed no evidence of DJD.       Past Medical History  Thyroid cyst         Past Surgical History  *Denies any surgical procedures         Medication List  hydrochlorothiazide 12.5 mg oral capsule         Allergy  "List  lisinopril         Family Medical History  Family history of heart disease         Social History  Tobacco (Former)         Review of Systems  · Constitutional  o Admits  o : weight loss  o Denies  o : fever, night sweats  · Eyes  o Denies  o : discharge from eye, impaired vision  · HENT  o Admits  o : *See HPI  · Cardiovascular  o Denies  o : chest pain, irregular heart beats  · Respiratory  o Denies  o : shortness of breath, wheezing, coughing up blood  · Gastrointestinal  o Denies  o : heartburn, reflux, vomiting blood  · Genitourinary  o Admits  o : frequency  · Integument  o Denies  o : rash, skin dryness  · Neurologic  o Denies  o : seizures, loss of balance, loss of consciousness, dizziness  · Endocrine  o Denies  o : cold intolerance, heat intolerance  · Heme-Lymph  o Denies  o : easy bleeding, anemia      Vitals  Date Time BP Position Site L\R Cuff Size HR RR TEMP (F) WT  HT  BMI kg/m2 BSA m2 O2 Sat FR L/min FiO2        12/31/2020 09:41 AM       16 98 179lbs 8oz 5'  6\" 28.97 1.95             Physical Examination  · Constitutional  o Appearance  o : well developed, well-nourished, alert and in no acute distress, voice clear and strong  · Head and Face  o Head  o :   § Inspection  § : no deformities or lesions  o Face  o :   § Inspection  § : No facial lesions; House-Brackmann I/VI bilaterally  § Palpation  § : No TMJ crepitus nor  muscle tenderness bilaterally  · Eyes  o Vision  o :   § Visual Fields  § : Extraocular movements are intact. No spontaneous or gaze-induced nystagmus.  o Conjunctivae  o : clear  o Sclerae  o : clear  o Pupils and Irises  o : pupils equal, round, and reactive to light.   · Ears, Nose, Mouth and Throat  o Ears  o :   § External Ears  § : appearance within normal limits, no lesions present  § Otoscopic Examination  § : tympanic membrane appearance within normal limits bilaterally without perforations, well-aerated middle ears  § Hearing  § : intact to " conversational voice both ears  o Nose  o :   § External Nose  § : appearance normal  § Intranasal Exam  § : mucosa within normal limits, vestibules normal, no intranasal lesions present, septum midline, sinuses non tender to percussion  o Oral Cavity  o :   § Oral Mucosa  § : oral mucosa normal without pallor or cyanosis  § Lips  § : lip appearance normal  § Teeth  § : normal dentition for age  § Gums  § : gums pink, non-swollen, no bleeding present  § Tongue  § : tongue appearance normal; normal mobility  § Palate  § : hard palate normal, soft palate appearance normal with symmetric mobility  o Throat  o :   § Oropharynx  § : no inflammation or lesions present, tonsils within normal limits  § Hypopharynx  § : appearance within normal limits, superior epiglottis within normal limits  § Larynx  § : appearance within normal limits, vocal cords within normal limits, no lesions present  · Neck  o Inspection/Palpation  o : normal appearance, no masses or tenderness, several small lymph nodes palpated throughout including along the left trapezius. These are mobile, had no surrounding soft tissue changes. Trachea midline; thyroid size normal, nontender, no nodules or masses present on palpation  · Respiratory  o Respiratory Effort  o : breathing unlabored  o Inspection of Chest  o : normal appearance, no retractions  · Cardiovascular  o Heart  o : regular rate and rhythm  · Lymphatic  o Neck  o : no lymphadenopathy present  o Supraclavicular Nodes  o : no lymphadenopathy present  o Preauricular Nodes  o : no lymphadenopathy present  · Skin and Subcutaneous Tissue  o General Inspection  o : Regarding face and neck - there are no rashes present, no lesions present, and no areas of discoloration  · Neurologic  o Cranial Nerves  o : cranial nerves II-XII are grossly intact bilaterally  o Gait and Station  o : normal gait, able to stand without diffculty  · Psychiatric  o Judgement and Insight  o : judgment and insight  intact  o Mood and Affect  o : mood normal, affect appropriate          Assessment  · Lymphadenopathy     785.6/R59.1  · Thyroid nodule     241.0/E04.1      Plan  · Medications  o Medications have been Reconciled  o Transition of Care or Provider Policy  · Instructions  o She presents the clinic today accompanied by her mother for evaluation of issues with thyroid nodule that were incidentally discovered on ultrasound of the thyroid as well as lymphadenopathy that she noted after being sick with Covid in October. She notes that she has been very worried about the lymphadenopathy, and has not been eating well. She has lost about 10 pounds during that time. She denies any other symptoms such as fatigue, fevers, night sweats, chills, or malaise. She has noted no lymphadenopathy elsewhere in her body. She did have an ultrasound for work-up of this in December which shows normal-sized thyroid gland with a 0.5 cm colloid cyst on the right side. There were several lymph nodes all about normal in size with normal fatty hilum and were thought to be reactive in nature.Has been very worried as she has a friend that was diagnosed with lymphoma and also had lymphadenopathy. She informs me that her lymph nodes have been decreasing in size. She has had some left upper extremity pain and x-rays of the neck revealed no evidence of DJD. On examination today she had several small palpable lymph nodes that are mobile and feel normal. I discussed the ultrasound findings with her. If she has any progressive enlargement or increase in the number of lymph nodes she can palpate, of asked her to contact me for reexamination. I did not recommend any further imaging studies at this point and did reassure her about the findings. I will be glad to see her back in the future should there be any further issues.  o Electronically Identified Patient Education Materials Provided Electronically  · Correspondence  o ENT Letter to Referring MD (Gail  LORIE ASHER) - 12/31/2020            Electronically Signed by: Jose Rossi MD -Author on December 31, 2020 02:32:57 PM

## 2021-05-14 VITALS — TEMPERATURE: 98 F | WEIGHT: 179.5 LBS | RESPIRATION RATE: 16 BRPM | HEIGHT: 66 IN | BODY MASS INDEX: 28.85 KG/M2

## 2021-05-18 NOTE — PROGRESS NOTES
Ayesha Patel  1985     Office/Outpatient Visit    Visit Date: Tue, Jan 12, 2021 08:50 am    Provider: Mitch Coulter MD (Assistant: Phylicia Hawk MA)    Location: [facility]        Electronically signed by Mitch Coulter MD on  01/13/2021 08:00:27 AM                             Subjective:        CC: 'I had COVID the end of October'    HPI:       Ayesha is in today for follow up on COVID.  She is not sure what to make of things.  She did have COVID the end of October.  She then had recurrent symptoms and had some blood work.  Ayesha had an issue with pain in the back of the head.  She says that this is an 'achy burny kind of feeling'.  This would go down into her neck.  She also had a knot show up on her L shoulder.  This freaked her out because of a friend with lymphoma.  She came in for evaluation including ultrasound.  She had some swollen lymph nodes in the neck - front and back.  These were felt to be reactive possibly from COVID.  She has noted that the pain in the back of the neck has moved to the front of the neck.  This is a very 'achy burny icky feeling'.  She also had some pain in the lateral upper arm.  She also had a cyst on her thyroid for which she saw ENT.  She was told that things were okay.  However, she is considering having CT of the neck soft tissue for evaluation.  It is from the upper chest upward.  She says that things are not normal and she is very concerned.    ROS:     CONSTITUTIONAL:  Positive for fever ( 'I feel warm' ).   Negative for chills.      E/N/T:  Negative for nasal congestion and sore throat.      CARDIOVASCULAR:  Positive for chest pain ( upper chest pain with deep breath ).   Negative for palpitations.      RESPIRATORY:  Negative for recent cough and dyspnea.      GASTROINTESTINAL:  Negative for abdominal pain, nausea and vomiting.      INTEGUMENTARY/BREAST:  Negative for atypical mole(s) and rash.          Past Medical History /  Family History / Social History:         Last Reviewed on 2021 09:12 AM by Mitch Coulter    Past Medical History:                 PAST MEDICAL HISTORY             GYNECOLOGICAL HISTORY:     miscarriage 1    Problems with pregnancy have included gestational diabetes; elevated blood pressure.      Contraception: partner is S/P vasectomy; Hospitalizations: pregnancy         PREVENTIVE HEALTH MAINTENANCE             MAMMOGRAM: Done within last 2 years and results in are chart was last done 08- Neg         Surgical History:     NONE         Family History:     Father: Healthy     Mother:    Positive for Hypertension;     Brother(s): Healthy; 1 brother(s) total     Son(s): Healthy; 1 son(s) total     Maternal Grandfather:    Positive for Cancer (unspecified type);     Maternal Grandmother:    Positive for Hypertension and Myocardial Infarction;         Social History:     Occupation: [employer] sales     Marital Status:      Children: 2 children         Tobacco/Alcohol/Supplements:     Last Reviewed on 2021 09:12 AM by Mitch Coulter    Tobacco: She has never smoked.          Alcohol: Frequency: Socially     Caffeine:  She admits to consuming caffeine via soda ( 1 servings per week ).          Substance Abuse History:     Last Reviewed on 2021 09:12 AM by Mitch Coulter    NEGATIVE         Mental Health History:     Last Reviewed on 2021 09:12 AM by Mitch Coulter    NEGATIVE         Communicable Diseases (eg STDs):     Last Reviewed on 2021 09:12 AM by Mitch Coulter        Current Problems:     Last Reviewed on 2021 09:12 AM by Mitch Coulter    Pure hyperglyceridemia    Unspecified abnormal findings in urine    Encounter for screening for depression    Localized swelling, mass and lump, neck    Enlarged lymph nodes, unspecified    Cervicalgia    Pain in left thigh    Chest pain, unspecified    COVID-19        Immunizations:      influenza, injectable, quadrivalent, preservative free 10/21/2020    Hep A, adult dose 5/14/2018    Fluzone (3 + years dose) 12/11/2012    Fluzone (3 + years dose) 11/8/2017    Fluzone Quadrivalent (3+ years) 1/18/2019    Fluzone Quadrivalent (3+ years) 10/1/2019        Allergies:     Last Reviewed on 1/12/2021 09:12 AM by Mitch Coulter    Lisinopril: diarrhea,vomiting,abdominal pain  (Adverse Reaction)        Current Medications:     Last Reviewed on 1/12/2021 09:12 AM by Mitch Coulter    hydroCHLOROthiazide 25 mg oral tablet [ 1 pill daily]    nystatin 100,000 unit/gram Topical Cream [apply to the affected area(s) by topical route 2 times per day]        Objective:        Vitals:         Current: 1/12/2021 8:53:17 AM    Ht:  5 ft, 5 in;  Wt: 181.8 lbs;  BMI: 30.3T: 98.3 F (temporal);  BP: 146/88 mm Hg (left arm, sitting);  P: 76 bpm (left arm (BP Cuff), sitting);  sCr: 0.67 mg/dL;  GFR: 125.66        Repeat:     9:15:56 AM  BP:   148/88mm Hg (left arm, sitting, HR: 77)     Exams:     PHYSICAL EXAM:     GENERAL: vital signs recorded - well developed, well nourished;  no apparent distress;     EYES: extraocular movements intact; conjunctiva and cornea are normal; PERRL;     E/N/T:  normal EACs, TMs, nasal/oral mucosa, teeth, gingiva, and oropharynx;     NECK: range of motion is normal; thyroid is non-palpable;     RESPIRATORY: normal respiratory rate and pattern with no distress; normal breath sounds with no rales, rhonchi, wheezes or rubs;     CARDIOVASCULAR: normal rate; rhythm is regular;  no systolic murmur; no edema;     GASTROINTESTINAL: nontender; normal bowel sounds; no organomegaly;     LYMPHATIC: anterior cervical node(s);  left posterior cervical node;  no supraclavicular nodes;     BREAST/INTEGUMENT: SKIN: no significant rashes or lesions; no suspicious moles;     NEUROLOGIC: mental status: alert and oriented x 3; cranial nerves II-XII grossly intact;     PSYCHIATRIC: appropriate affect  and demeanor; normal psychomotor function;         Assessment:         U07.1   COVID-19       R59.9   Enlarged lymph nodes, unspecified       M54.2   Cervicalgia       R07.9   Chest pain, unspecified           ORDERS:         Radiology/Test Orders:       24843  Computed tomography, soft tissue neck; with contrast material(s)  (Send-Out)            24218  CT chest with contrast  (Send-Out)                      Plan:         COVID-19        RECOMMENDATIONS given include: Today, we have reviewed Ayesha's care.  She has had an ongoing course with pain and swollen lymph nodes.  Because of her ongoing symptoms, I'm going to recommend moving forward with CT soft tissues of the neck and chest as a precaution.  We will see what this testing shows and move forward from there.  These symptoms have not improved..          Enlarged lymph nodes, unspecified          Orders:       25047  Computed tomography, soft tissue neck; with contrast material(s)  (Send-Out)              CervicalgiaAs above.        Chest pain, unspecified          Orders:       23029  CT chest with contrast  (Send-Out)                  Charge Capture:         Primary Diagnosis:     U07.1  COVID-19           Orders:      00523  Office/outpatient visit; established patient, level 4  (In-House)              R59.9  Enlarged lymph nodes, unspecified     M54.2  Cervicalgia     R07.9  Chest pain, unspecified

## 2021-05-18 NOTE — PROGRESS NOTES
Ayesha Patel 1985     Office/Outpatient Visit    Visit Date: Mon, Mar 11, 2019 10:37 am    Provider: Jacinda Geiger N.P. (Assistant: Sarah Spurling, MA)    Location: [facility]        Electronically signed by Jacinda Geiger N.P. on  2019 12:58:29 PM                             SUBJECTIVE:        CC:     Ms. Patel is a 33 year old White female.  Pt is here for a headache. It started 2.5-3 weeks ago. She said it's a slight headache, and it won't go away. She said when she takes aleve, it sometimes takes it away but it comes right back.;         HPI:         Ms. Patel presents with headache.  Onset was approximately three weeks ago.  The location is primarily left and right frontal.  The pain radiates to the temples.  She has had prior headaches similar to this one.  Associated symptoms include allergy symptoms and sinus congestion.  She denies pain in teeth.  It is improved with NSAIDs.  Pertinent past medical history includes allergies, migraines and hypertension.      ROS:     CONSTITUTIONAL:  Negative for chills, fatigue, fever, and weight change.      EYES:  Positive for eye tender.      CARDIOVASCULAR:  Negative for chest pain.      RESPIRATORY:  Negative for dyspnea.      NEUROLOGICAL:  Positive for headaches.      PSYCHIATRIC:  Negative for anxiety, depression, and sleep disturbances.          PMH/FMH/SH:     Last Reviewed on 2018 01:37 PM by Gail Joseph    Past Medical History:                 PAST MEDICAL HISTORY             GYNECOLOGICAL HISTORY:        Problems with pregnancy have included gestational diabetes; elevated blood pressure.      Current method of contraception is IUD; Hospitalizations: pregnancy         PREVENTIVE HEALTH MAINTENANCE             MAMMOGRAM: Done within last 2 years and results in are chart was last done 08- Neg         Surgical History:     NONE         Family History:     Father: Healthy     Mother:     Positive for Hypertension;     Brother(s): Healthy; 1 brother(s) total     Son(s): Healthy; 1 son(s) total     Maternal Grandfather:    Positive for Cancer (unspecified type);     Maternal Grandmother:    Positive for Hypertension and Myocardial Infarction;         Social History:     Occupation: [employer] sales     Marital Status:      Children: 2 children         Tobacco/Alcohol/Supplements:     Last Reviewed on 1/18/2019 02:12 PM by Phylicia Hawk    Tobacco: She has never smoked.      Caffeine:  She admits to consuming caffeine via soda ( 1 servings per week ).          Substance Abuse History:     Last Reviewed on 11/29/2016 01:25 PM by Jacinda Geiger    NEGATIVE         Mental Health History:     Last Reviewed on 11/29/2016 01:25 PM by Jacinda Geiger        Communicable Diseases (eg STDs):     Last Reviewed on 11/29/2016 01:25 PM by Jacinda Geiger            Current Problems:     Last Reviewed on 11/29/2016 01:25 PM by Jacinda Geiger    Positive pregnancy test     Low back pain     Nasal Congestion     Elevated cholesterol     Screening for cholesterol level     Hypertension     Fatigue     Essential hypertension     Acne     Skin boil         Immunizations:     Hep A, adult dose 5/14/2018     Fluzone (3 + years dose) 12/11/2012     Fluzone (3 + years dose) 11/8/2017     Fluzone Quadrivalent (3+ years) 1/18/2019         Allergies:     Last Reviewed on 1/18/2019 02:11 PM by Phylicia Hawk    Lisinopril: abdominal pain, diarrhea, vomiting (Adverse Reaction)        Current Medications:     Last Reviewed on 1/18/2019 02:11 PM by Phylicia Hawk    Hydrochlorothiazide (HCTZ) 25mg Tablet 1/2 to 1 pill daily     Mirena 52mg Intrauterine System as directed         OBJECTIVE:        Vitals:         Current: 3/11/2019 10:44:15 AM    Ht:  5 ft, 5 in;  Wt: 196.4 lbs;  BMI: 32.7    T: 98.7 F (oral);  BP: 126/72 mm Hg (left arm, sitting);  P: 70 bpm (left arm (BP Cuff), sitting);  sCr: 0.78 mg/dL;  GFR:  113.64        Exams:     PHYSICAL EXAM:     GENERAL: vital signs recorded - well developed, well nourished;  no apparent distress;     EYES: extraocular movements intact; conjunctiva and cornea are normal; PERRL, EOMI     E/N/T: EARS: external auditory canal normal;  both TMs are have fluid behind them;  NOSE: bilateral maxillary sinus tenderness present;     NECK: range of motion is normal; thyroid is non-palpable;     RESPIRATORY: normal respiratory rate and pattern with no distress; normal breath sounds with no rales, rhonchi, wheezes or rubs;     CARDIOVASCULAR: normal rate; rhythm is regular;  no systolic murmur; no edema;     LYMPHATIC: no enlargement of cervical or facial nodes; no supraclavicular nodes;     MUSCULOSKELETAL: normal gait; normal range of motion of all major muscle groups;     NEUROLOGIC: GROSSLY INTACT     PSYCHIATRIC:  appropriate affect and demeanor; normal speech pattern; grossly normal memory;         ASSESSMENT:           784.0   R51   J01.20  Headache              DDx:     461.8   J01.20  Acute sinusitis, other              DDx:         ORDERS:         Meds Prescribed:       Augmentin (Amoxicillin/Clavulanate) 875mg/125mg Tablet 1 tab bid X 10 days  #20 (Twenty) tablet(s) Refills: 0       Fluticasone Propionate 50mcg/1actuation Nasal Spray 1 spray each nostil daily  #3 (Three) gm Refills: 1                 PLAN:          Headache           Prescriptions:       Fluticasone Propionate 50mcg/1actuation Nasal Spray 1 spray each nostil daily  #3 (Three) gm Refills: 1          Acute sinusitis, other           Prescriptions:       Augmentin (Amoxicillin/Clavulanate) 875mg/125mg Tablet 1 tab bid X 10 days  #20 (Twenty) tablet(s) Refills: 0           Patient Education Handouts:       Sinus Infection (Sinusitis)              CHARGE CAPTURE:           Primary Diagnosis:     784.0 Headache            R51    Headache           J01.20    Acute ethmoidal sinusitis, unspecified              Orders:           22231   Office/outpatient visit; established patient, level 3  (In-House)           461.8 Acute sinusitis, other            J01.20    Acute ethmoidal sinusitis, unspecified

## 2021-05-18 NOTE — PROGRESS NOTES
"Ayesha Patel  1985     Office/Outpatient Visit    Visit Date:  01:52 pm    Provider: Gail Joseph N.P. (Assistant: Mi Martinez LPN)    Location: [facility]        Electronically signed by Gail Joseph N.P. on  2019 03:31:43 PM                             Subjective:        CC: Ms. Patel is a 34 year old White female.  She presents with pelvic pain.          HPI:           Concerning pelvic and perineal pain, this is located primarily in the pelvis.  There is some radiation to the upper thighs.  It began 5 days ago.  The onset of pain occurred with no apparent trigger.  She characterizes it as aching, dull, and pressure.  She denies dysuria, fever and hematuria.  She went to [facility] on 19 \"because it felt like a UTI starting\", urine sample collected, prescribed pyridium which has not helped.      ROS:     CONSTITUTIONAL:  Negative for chills, fatigue, fever, and weight change.      CARDIOVASCULAR:  Negative for chest pain, palpitations, tachycardia, orthopnea, and edema.      RESPIRATORY:  Negative for cough, dyspnea, and hemoptysis.      GASTROINTESTINAL:  Negative for constipation and diarrhea.      GENITOURINARY:  Negative for lesions on external genitalia, hematuria, urinary incontinence and vaginal discharge.      PSYCHIATRIC:  Negative for anxiety, depression, and sleep disturbances.          Past Medical History / Family History / Social History:         Last Reviewed on 2019 02:37 PM by Gail Joseph    Past Medical History:                 PAST MEDICAL HISTORY             GYNECOLOGICAL HISTORY:     miscarriage 1    Problems with pregnancy have included gestational diabetes; elevated blood pressure.      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          Alcohol: Frequency: Socially     Caffeine:  She admits to consuming caffeine via soda ( 1 servings per week ).          Immunizations:     Hep A, adult dose 5/14/2018    Fluzone (3 + years dose) 12/11/2012    Fluzone (3 + years dose) 11/8/2017    Fluzone Quadrivalent (3+ years) 1/18/2019    Fluzone Quadrivalent (3+ years) 10/1/2019        Allergies:     Last Reviewed on 11/19/2019 01:53 PM by Mi Martinez    Lisinopril: abdominal pain, diarrhea, vomiting  (Adverse Reaction)        Current Medications:     Last Reviewed on 11/19/2019 01:59 PM by Gail Joseph    Hydrochlorothiazide (HCTZ) 25mg Tablet [1/2 to 1 pill daily]        Objective:        Vitals:         Current: 11/19/2019 1:56:17 PM    Ht:  5 ft, 5 in;  Wt: 201.6 lbs;  BMI: 33.5T: 98.4 F (oral);  BP: 129/81 mm Hg (left arm, sitting);  P: 74 bpm (left arm (BP Cuff), sitting);  sCr: 0.64 mg/dL;  GFR: 138.75        Exams:     PHYSICAL EXAM:     GENERAL: vital signs recorded - well developed, well nourished;  no apparent distress;     RESPIRATORY: normal respiratory rate and pattern with no distress; normal breath sounds with no rales, rhonchi, wheezes or rubs;     CARDIOVASCULAR: normal rate; rhythm is regular;  no systolic murmur;     GASTROINTESTINAL: no suprapubic tenderness;     MUSCULOSKELETAL: no CVA tenderness;         Assessment:         R10.2    Pelvic and perineal pain       R82.90   Unspecified abnormal findings in urine           ORDERS:         Meds Prescribed:       [New Rx] amoxicillin 875 mg oral tablet [take 1 tablet (875 mg) by oral route every 12 hours], #20 (twenty) tablets, Refills: 0 (zero)                 Plan:         Pelvic and perineal pain        RECOMMENDATIONS given include: discussed not holding urine to prevent future infections.      FOLLOW-UP: Instructed to call if new or worsening symptoms develop.          [facility]pec[facility] and notified us that her urine was positive for Group B Strep, their fax was done, to get copy from them        RECOMMENDATIONS given include: increase fluids.      FOLLOW-UP: Advised to call if there is no improvement in 2 day(s).            Prescriptions:       [New Rx] amoxicillin 875 mg oral tablet [take 1 tablet (875 mg) by oral route every 12 hours], #20 (twenty) tablets, Refills: 0 (zero)             Patient Recommendations:        For  Pelvic and perineal pain:        Call the office if you develop nausea, vomiting, vomiting blood, black or tarry bowel movements, rectal bleeding, weight loss, acid taste in the mouth, jaundice, diarrhea, fever, painful urination, or urinary bleeding.              Charge Capture:         Primary Diagnosis:     R10.2  Pelvic and perineal pain           Orders:      58554  Office/outpatient visit; established patient, level 3  (In-House)              R82.90  Unspecified abnormal findings in urine

## 2021-05-18 NOTE — PROGRESS NOTES
Ayesha PatelNegrito 1985     Office/Outpatient Visit    Visit Date:  02:34 pm    Provider: Gail Joseph N.P. (Assistant: Stephanie Boyd MA)    Location: [facility]        Electronically signed by Gail Joseph N.P. on  2019 07:21:05 AM                             SUBJECTIVE:        CC:     Ms. Patel is a 33 year old White female.  This is a follow-up visit.  She presents with headache that has not improved.          HPI:         Ms. Patel presents with headache.  Onset was 5-6 weeks ago.  The location is primarily frontal.  The pain radiates to the right temple.  most of the time but also her left temple She characterizes it as dull and sharp.  She denies nausea and vision disturbance.  has been off hormone (she has headaches daily, wakes up with headache, gets up and gets better, comes back later in the day, takes tylenol (currently spotting lightly) she took flonase and ATB, Augmentin and they did not help, she had her Mirena  IUD removed 19, miscarriage one week later     ROS:     CONSTITUTIONAL:  Negative for chills, fatigue, fever, and weight change.      E/N/T:  Negative for sinus or ear issues. wants me to check her ears because she was told her may have fluid in her ears.      CARDIOVASCULAR:  Negative for chest pain, palpitations, tachycardia, orthopnea, and edema.      RESPIRATORY:  Negative for cough, dyspnea, and hemoptysis.      NEUROLOGICAL:  Negative for paresthesias and weakness.      PSYCHIATRIC:  Positive for insomnia.  her daughter wakes up and gets in her bed usually every am at 3         [facility]/:     Last Reviewed on 2019 02:44 PM by Gail Joseph    Past Medical History:                 PAST MEDICAL HISTORY             GYNECOLOGICAL HISTORY:     miscarriage 1    Problems with pregnancy have included gestational diabetes; elevated blood pressure.      Contraception: partner is S/P vasectomy; Hospitalizations:  pregnancy         PREVENTIVE HEALTH MAINTENANCE             MAMMOGRAM: Done within last 2 years and results in are chart was last done 08- Neg         Surgical History:     NONE         Family History:     Father: Healthy     Mother:    Positive for Hypertension;     Brother(s): Healthy; 1 brother(s) total     Son(s): Healthy; 1 son(s) total     Maternal Grandfather:    Positive for Cancer (unspecified type);     Maternal Grandmother:    Positive for Hypertension and Myocardial Infarction;         Social History:     Occupation: [employer] sales     Marital Status:      Children: 2 children         Tobacco/Alcohol/Supplements:     Last Reviewed on 4/01/2019 02:38 PM by Stephanie Boyd    Tobacco: She has never smoked.      Caffeine:  She admits to consuming caffeine via soda ( 1 servings per week ).              Immunizations:     Hep A, adult dose 5/14/2018     Fluzone (3 + years dose) 12/11/2012     Fluzone (3 + years dose) 11/8/2017     Fluzone Quadrivalent (3+ years) 1/18/2019         Allergies:     Last Reviewed on 4/01/2019 02:38 PM by Stephanie Boyd    Lisinopril: abdominal pain, diarrhea, vomiting (Adverse Reaction)        Current Medications:     Last Reviewed on 4/01/2019 02:38 PM by Stephanie Boyd    Hydrochlorothiazide (HCTZ) 25mg Tablet 1/2 to 1 pill daily     Fluticasone Propionate 50mcg/1actuation Nasal Spray 1 spray each nostil daily         OBJECTIVE:        Vitals:         Current: 4/1/2019 2:40:39 PM    Ht:  5 ft, 5 in;  Wt: 196 lbs;  BMI: 32.6    T: 98.4 F (oral);  BP: 134/76 mm Hg (right arm, sitting);  P: 74 bpm (right arm (BP Cuff), sitting);  sCr: 0.78 mg/dL;  GFR: 113.54        Exams:     PHYSICAL EXAM:     GENERAL: vital signs recorded - well developed, well nourished;  no apparent distress;     EYES: PERRLA;     E/N/T: EARS:  normal external auditory canals and tympanic membranes;  grossly normal hearing;     NECK: carotid exam reveals no bruits;     RESPIRATORY: normal  respiratory rate and pattern with no distress; normal breath sounds with no rales, rhonchi, wheezes or rubs;     CARDIOVASCULAR: normal rate; rhythm is regular;  no systolic murmur; no edema;     NEUROLOGIC: GROSSLY INTACT     PSYCHIATRIC:  appropriate affect and demeanor; normal speech pattern; grossly normal memory;         ASSESSMENT           784.0   R51  Headache              DDx:     401.1   I10  Hypertension              DDx:         ORDERS:         Meds Prescribed:       Amitriptyline HCl 10mg Tablet Take 1 - 2 tablet(s) by mouth at bedtime  #60 (Sixty) tablet(s) Refills: 1       Refill of: Hydrochlorothiazide (HCTZ) 25mg Tablet 1/2 to 1 pill daily  #90 (Ninety) tablet(s) Refills: 1         Lab Orders:       [id number]  [facility] Comp. Metabolic Panel  (Send-Out)         [id number]  [facility] TSH  (Send-Out)                   PLAN:          Headache consider CT of her head, her GYN follow up appt is in a few weeks, trial of low dose elavil to see if this helps         FOLLOW-UP: Advised to call if there is no improvement in 1 week(s).            Prescriptions:       Amitriptyline HCl 10mg Tablet Take 1 - 2 tablet(s) by mouth at bedtime  #60 (Sixty) tablet(s) Refills: 1          Hypertension needs her refills, last lab 5-2018     LABORATORY:  Labs ordered to be performed today include Comprehensive metabolic panel and TSH.            Prescriptions:       Refill of: Hydrochlorothiazide (HCTZ) 25mg Tablet 1/2 to 1 pill daily  #90 (Ninety) tablet(s) Refills: 1           Orders:       [id number]  [facility] Comp. Metabolic Panel  (Send-Out)         [id number]  [facility] TSH  (Send-Out)               CHARGE CAPTURE           **Please note: ICD descriptions below are intended for billing purposes only and may not represent clinical diagnoses**        Primary Diagnosis:         784.0 Headache            R51    Headache              Orders:          [id number]   Office/outpatient visit; established patient, level 4  (In-House)            401.1 Hypertension            I10    Essential (primary) hypertension

## 2021-05-18 NOTE — PROGRESS NOTES
Ayesha PatelNegrito 1985     Office/Outpatient Visit    Visit Date: Wed, Aug 8, 2018 01:17 pm    Provider: Gail Joseph N.P. (Assistant: Sarah Spurling, MA)    Location: [facility]        Electronically signed by Gail Joseph N.P. on  2018 08:48:34 AM                             SUBJECTIVE:        CC:     Ms. Patel is a 33 year old White female.  Having lower back pain, afraid she still may have UTI.;         HPI:         Ms. Patel presents with low back pain.  The discomfort is most prominent in the lower, right lumbar spine.  She characterizes it as sharp.  She states that the current episode of pain started yesterday.  She does not recall any precipitating event or injury.  She denies radicular leg pain.      ROS:     CONSTITUTIONAL:  Negative for fever.      GENITOURINARY:  Negative for dysuria and hematuria.      MUSCULOSKELETAL:  Positive for pain in right axilla for 2 months , intermittently, went to chiropractor.  no known injury, no related to her menses, had the pain earlier today     INTEGUMENTARY/BREAST:  Negative for breast mass.          [facility]/:     Last Reviewed on 2018 01:37 PM by Gail Joseph    Past Medical History:                 PAST MEDICAL HISTORY             GYNECOLOGICAL HISTORY:        Problems with pregnancy have included gestational diabetes; elevated blood pressure.      Current method of contraception is IUD; Hospitalizations: pregnancy         Surgical History:     NONE         Family History:     Father: Healthy     Mother:    Positive for Hypertension;     Brother(s): Healthy; 1 brother(s) total     Son(s): Healthy; 1 son(s) total     Maternal Grandfather:    Positive for Cancer (unspecified type);     Maternal Grandmother:    Positive for Hypertension and Myocardial Infarction;         Social History:     Occupation: [employer] sales     Marital Status:      Children: 2 children         Tobacco/Alcohol/Supplements:      Last Reviewed on 8/08/2018 01:26 PM by Spurling, Sarah C    Tobacco: She has never smoked.      Caffeine:  She admits to consuming caffeine via soda ( 1 servings per week ).              Immunizations:     Hep A, adult dose 5/14/2018     Fluzone (3 + years dose) 12/11/2012     Fluzone (3 + years dose) 11/8/2017         Allergies:     Last Reviewed on 8/08/2018 01:25 PM by Spurling, Sarah C    Lisinopril: abdominal pain, diarrhea, vomiting (Adverse Reaction)        Current Medications:     Last Reviewed on 8/08/2018 01:26 PM by Spurling, Sarah C    Hydrochlorothiazide (HCTZ) 25mg Tablet 1/2 to 1 pill daily     Mirena 52mg Intrauterine System as directed         OBJECTIVE:        Vitals:         Current: 8/8/2018 1:25:12 PM    Ht:  5 ft, 5 in;  Wt: 192.8 lbs;  BMI: 32.1    T: 98.3 F (oral);  BP: 126/77 mm Hg (right arm, sitting);  P: 85 bpm (right arm (BP Cuff), sitting);  sCr: 0.78 mg/dL;  GFR: 112.75        Exams:     PHYSICAL EXAM:     GENERAL: vital signs recorded - well developed, well nourished;  no apparent distress;     NECK: carotid exam reveals no bruits;     RESPIRATORY: normal respiratory rate and pattern with no distress; normal breath sounds with no rales, rhonchi, wheezes or rubs;     CARDIOVASCULAR: normal rate; rhythm is regular;  no systolic murmur; no edema;     LYMPHATIC: no axillary adenopathy;     BREAST/INTEGUMENT: no axillary or breast mass, there is tenderness to palpation of proximal  right axilla; no  nipple discharge     MUSCULOSKELETAL: full ROM of back, no tenderness to palpation of her lumbar para spinous muscles, SLR neg, equal strength to bilateral lower ext No CVA tenderness     PSYCHIATRIC:  appropriate affect and demeanor; normal speech pattern; grossly normal memory;         ASSESSMENT           724.2   M54.5  Low back pain              DDx:     729.5   M79.621  Arm pain              DDx:         ORDERS:         Meds Prescribed:       Etodolac 400mg Tablet One tablet BID with food   #60 (Sixty) tablet(s) Refills: 0         Lab Orders:       [id number]  Urinalysis, automated, without microscopy  (In-House)         37195  [facility] - [facility] Urinalysis, automated, with micro  (Send-Out)         23800  [facility] - [facility] Urine Culture  (Send-Out)                   PLAN:          Low back pain [id number] /1 + bacteria in urine /will culture urine, reviewed recent urine culture on 7-16-18, only urine dip done, was treated for UTI, but urine culture was negative     LABORATORY:  Labs ordered to be performed today include Urine culture.      RECOMMENDATIONS given include: increase fluid intake.      FOLLOW-UP: Advised to call if there is no improvement Follow-up by phone if no improvement in 1 week..  .            Prescriptions:       Etodolac 400mg Tablet One tablet BID with food  #60 (Sixty) tablet(s) Refills: 0           Orders:       [id number]  Urinalysis, automated, without microscopy  (In-House)         37582  [facility] - [facility] Urinalysis, automated, with micro  (Send-Out)         07930  [facility] - [facility] Urine Culture  (Send-Out)             Patient Education Handouts:       Acute Low Back Pain           Arm pain will check a ultrasound of right axilla             Patient Recommendations:        For  Low back pain:     Drink plenty of fluids.  Fever increases the loss of fluids and can lead to dehydration.  Follow-up by phone if no improvement in 1 week.                APPOINTMENT INFORMATION:        Monday Tuesday Wednesday Thursday Friday Saturday Sunday            Time:___________________AM  PM   Date:_____________________             CHARGE CAPTURE           **Please note: ICD descriptions below are intended for billing purposes only and may not represent clinical diagnoses**        Primary Diagnosis:         724.2 Low back pain            M54.5    Low back pain              Orders:          [id number]   Office/outpatient visit; established patient, level 4  (In-House)             66469   Urinalysis, automated, without  microscopy  (In-House)           729.5 Arm pain            M79.621    Pain in right upper arm

## 2021-05-18 NOTE — PROGRESS NOTES
Ayesha Patel  1985     Office/Outpatient Visit    Visit Date: Tue, Feb 23, 2021 01:26 pm    Provider: Sondra Wade N.P. (Assistant: Manjula Bahena, GHAZAL)    Location: [facility]        Electronically signed by Sondra Wade N.P. on  02/23/2021 03:44:43 PM                             Subjective:        CC: Mrs. Patel is a 35 year old White female.  follow up;         HPI: 35-year-old female presenting to the office for follow-up regarding multiple referrals and testing.  Patient states that she still feels as if she is having some pain in her upper neck and base of head.  She states that pain radiates down into her shoulders.  She also has pain in various parts of her body.  Including bilateral upper arms, left ankle and left thigh.  No numbness or tingling.  States she would rather lie in bed due to pain then get up and be motivated.  Completely denies any depression/anxiety.  States she is anxious about what is wrong with her that no one can figure out.  Patient has seen myself multiple times, Dr. Coulter, Dr. Celis and ENT, Dr. Menjivar and oncologist and another physician in [place] who specializes in internal medicine.  Physician in [place] has ordered additional tests.  She has follow-up with her on March 9. I had previously tried to order CT of C-spine and it was denied by insurance.  Dr. Coulter had tried to order a CT of neck as well.  This was also denied.  The ENT and oncologist said to follow-up as needed.  No scheduled follow-ups.  Internal medicine suggested doing physical therapy.  Patient states that she does not think that it is muscular. All of present symptoms began approximately 2 weeks after COVID.    ROS:     CONSTITUTIONAL:  Negative for fatigue and fever.      EYES:  Negative for blurred vision.      E/N/T:  Negative for ear pain, sinus pressure and sore throat.      CARDIOVASCULAR:  Negative for chest pain, palpitations, pedal edema and  tachycardia.      RESPIRATORY:  Negative for recent cough and dyspnea.      GASTROINTESTINAL:  Negative for abdominal pain, constipation, diarrhea, nausea and vomiting.      MUSCULOSKELETAL:  Positive for arthralgias, joint stiffness and myalgias.      INTEGUMENTARY/BREAST:  Negative for rash.      NEUROLOGICAL:  Negative for dizziness, paresthesias and weakness.      PSYCHIATRIC:  Positive for feelings of stress.   Negative for anxiety, depression, difficulty concentrating, sleep disturbance or suicidal thoughts.          Past Medical History / Family History / Social History:         Last Reviewed on 2021 01:33 PM by Sondra Wade    Past Medical History:                 PAST MEDICAL HISTORY             GYNECOLOGICAL HISTORY:     miscarriage 1    Problems with pregnancy have included gestational diabetes; elevated blood pressure.      Contraception: partner is S/P vasectomy; Hospitalizations: pregnancy         PREVENTIVE HEALTH MAINTENANCE             MAMMOGRAM: Done within last 2 years and results in are chart was last done 08- Neg         Surgical History:     NONE         Family History:     Father: Healthy     Mother:    Positive for Hypertension;     Brother(s): Healthy; 1 brother(s) total     Son(s): Healthy; 1 son(s) total     Maternal Grandfather:    Positive for Cancer (unspecified type);     Maternal Grandmother:    Positive for Hypertension and Myocardial Infarction;         Social History:     Occupation: [employer] sales     Marital Status:      Children: 2 children         Tobacco/Alcohol/Supplements:     Last Reviewed on 2021 01:33 PM by Sondra Wade    Tobacco: She has never smoked.          Alcohol: Frequency: Socially     Caffeine:  She admits to consuming caffeine via soda ( 1 servings per week ).          Substance Abuse History:     Last Reviewed on 2021 01:33 PM by Sondra Wade    NEGATIVE         Mental Health History:     Last Reviewed on 2021  01:33 PM by Sondra Wade    NEGATIVE         Communicable Diseases (eg STDs):     Last Reviewed on 2/23/2021 01:33 PM by Sondra Wade        Immunizations:     influenza, injectable, quadrivalent, preservative free 10/21/2020    Hep A, adult dose 5/14/2018    Fluzone (3 + years dose) 12/11/2012    Fluzone (3 + years dose) 11/8/2017    Fluzone Quadrivalent (3+ years) 1/18/2019    Fluzone Quadrivalent (3+ years) 10/1/2019        Allergies:     Last Reviewed on 2/23/2021 01:33 PM by Sondra Wade    Lisinopril: diarrhea,vomiting,abdominal pain  (Adverse Reaction)        Current Medications:     Last Reviewed on 2/23/2021 01:33 PM by Sondra Wade    hydroCHLOROthiazide 25 mg oral tablet [ 1 pill daily]    nystatin 100,000 unit/gram Topical Cream [apply to the affected area(s) by topical route 2 times per day]        Objective:        Vitals:         Current: 2/23/2021 1:29:11 PM    Ht:  5 ft, 5 in;  Wt: 184.6 lbs;  BMI: 30.7T: 97.9 F (temporal);  BP: 149/82 mm Hg (right arm, sitting);  P: 82 bpm (right arm (BP Cuff), sitting);  sCr: 0.67 mg/dL;  GFR: 126.48        Exams:     PHYSICAL EXAM:     GENERAL: vital signs recorded - well developed, well nourished;  well groomed;  no apparent distress;     EYES: extraocular movements intact; conjunctiva and cornea are normal; PERRLA;     NECK: range of motion is normal; thyroid exam reveals not enlarged;     RESPIRATORY: normal respiratory rate and pattern with no distress; normal breath sounds with no rales, rhonchi, wheezes or rubs;     CARDIOVASCULAR: normal rate; rhythm is regular;  no systolic murmur; no edema;     LYMPHATIC: right posterior cervical node (.5 cm in size, nontender, mobile );     MUSCULOSKELETAL: normal gait; normal overall tone     NEUROLOGIC: mental status: alert and oriented x 3;     PSYCHIATRIC:  appropriate affect and demeanor; normal speech pattern; grossly normal memory;         Assessment:         M25.50   Pain in unspecified joint        M54.2   Cervicalgia           ORDERS:         Procedures Ordered:       REFER  Referral to Specialist or Other Facility  (Send-Out)                      Plan:         Pain in unspecified jointDiscussed potential of Fibromyalgia dx. Will send to rheumatology for further evaluation. Pt to have internal medicine to send records to this office. Pt to notify office with any acute concerns or issues.         REFERRALS:  Referral initiated to a rheumatologist ( Dr. Rashida Van ).            Orders:       REFER  Referral to Specialist or Other Facility  (Send-Out)              Cervicalgiarecommended massage to see if this helps relieve tension. Pt has agreed to try to see if this helps with symptoms.             Charge Capture:         Primary Diagnosis:     M25.50  Pain in unspecified joint           Orders:      01030  Office/outpatient visit; established patient, level 3  (In-House)              M54.2  Cervicalgia

## 2021-05-18 NOTE — PROGRESS NOTES
Ayesha Patel  1985     Office/Outpatient Visit    Visit Date: Mon, Aug 31, 2020 09:21 am    Provider: Sondra Wade N.P. (Assistant: Bahman Velazquez, )    Location: [facility]        Electronically signed by Sondra Wade N.P. on  2020 09:42:17 AM                             Subjective:        CC: Mrs. Patel is a 35 year old White female.  presents today due to urinary frequency, dysuria, since yesterday [phone] doximity video        HPI: 35-year-old female presenting for telehealth visit via video complaining of dysuria, polyuria and hematuria x1 day.  Patient started to take Azo at home.  Denies flank pain or abdominal pain.  No vaginal complaints.    ROS:     CONSTITUTIONAL:  Negative for chills, fatigue and fever.      CARDIOVASCULAR:  Negative for chest pain and palpitations.      RESPIRATORY:  Negative for recent cough and dyspnea.      GASTROINTESTINAL:  Negative for abdominal pain, constipation, diarrhea, nausea and vomiting.      GENITOURINARY:  Positive for hx frequent UTI's and prior to pregnancies polyuria.   Negative for urinary incontinence.      MUSCULOSKELETAL:  Negative for myalgias.      INTEGUMENTARY/BREAST:  Negative for rash.      NEUROLOGICAL:  Negative for dizziness, paresthesias and weakness.          Past Medical History / Family History / Social History:         Last Reviewed on 2020 09:25 AM by Sondra Wade    Past Medical History:                 PAST MEDICAL HISTORY             GYNECOLOGICAL HISTORY:     miscarriage 1    Problems with pregnancy have included gestational diabetes; elevated blood pressure.      Contraception: partner is S/P vasectomy; Hospitalizations: pregnancy         PREVENTIVE HEALTH MAINTENANCE             MAMMOGRAM: Done within last 2 years and results in are chart was last done 08- Neg         Surgical History:     NONE         Family History:     Father: Healthy     Mother:    Positive for  Hypertension;     Brother(s): Healthy; 1 brother(s) total     Son(s): Healthy; 1 son(s) total     Maternal Grandfather:    Positive for Cancer (unspecified type);     Maternal Grandmother:    Positive for Hypertension and Myocardial Infarction;         Social History:     Occupation: [employer] sales     Marital Status:      Children: 2 children         Tobacco/Alcohol/Supplements:     Last Reviewed on 8/31/2020 09:25 AM by Sondra Wade    Tobacco: She has never smoked.          Alcohol: Frequency: Socially     Caffeine:  She admits to consuming caffeine via soda ( 1 servings per week ).          Substance Abuse History:     Last Reviewed on 8/31/2020 09:25 AM by Sondra Wade    NEGATIVE         Mental Health History:     Last Reviewed on 8/07/2020 11:43 AM by Evangelist Barth        Communicable Diseases (eg STDs):     Last Reviewed on 8/07/2020 11:43 AM by Evangelist Barth        Immunizations:     Hep A, adult dose 5/14/2018    Fluzone (3 + years dose) 12/11/2012    Fluzone (3 + years dose) 11/8/2017    Fluzone Quadrivalent (3+ years) 1/18/2019    Fluzone Quadrivalent (3+ years) 10/1/2019        Allergies:     Last Reviewed on 8/31/2020 09:25 AM by Sondra Wade    Lisinopril: diarrhea,vomiting,abdominal pain  (Adverse Reaction)        Current Medications:     Last Reviewed on 8/31/2020 09:25 AM by Sondra Wade    hydroCHLOROthiazide 25 mg oral tablet [1/2 to 1 pill daily]    nystatin 100,000 unit/gram Topical Cream [apply to the affected area(s) by topical route 2 times per day]        Objective:        Exams:     PHYSICAL EXAM:     GENERAL: well developed, well nourished;  well groomed;  no apparent distress;     RESPIRATORY: normal respiratory rate and pattern with no distress;     NEUROLOGIC: mental status: alert and oriented x 3;     PSYCHIATRIC:  appropriate affect and demeanor; normal speech pattern; grossly normal memory;         Assessment:         R30.0   Dysuria           ORDERS:          Meds Prescribed:       [New Rx] nitrofurantoin monohyd/m-cryst 100 mg oral capsule [take 1 capsule (100 mg) by oral route 2 times per day with food], #14 (fourteen) capsules, Refills: 0 (zero)       [New Rx] phenazopyridine 200 mg oral tablet [take 1 tablet (200 mg) by oral route 3 times per day after meals as needed], #9 (nine) tablets, Refills: 0 (zero)                 Plan:         DysuriaPatient is to increase water intake and rest.  Complete prescribed antibiotic.  Pyridium as needed.  If symptoms do not subside, patient is to notify staff and urine culture can be ordered.  Monitor for worsening signs and symptoms of infection and notify clinic with any acute concerns.  Patient verbalizes understanding and has no further questions upon discharge.    Telehealth: Verbal consent obtained for visit to occur via televideo conferencing; Staff, other than provider, present during telephone visit include Bahman Velazquez MA           Prescriptions:       [New Rx] nitrofurantoin monohyd/m-cryst 100 mg oral capsule [take 1 capsule (100 mg) by oral route 2 times per day with food], #14 (fourteen) capsules, Refills: 0 (zero)       [New Rx] phenazopyridine 200 mg oral tablet [take 1 tablet (200 mg) by oral route 3 times per day after meals as needed], #9 (nine) tablets, Refills: 0 (zero)             Charge Capture:         Primary Diagnosis:     R30.0  Dysuria           Orders:      [id number]  Office/outpatient visit; established patient, level 3  (In-House)

## 2021-05-18 NOTE — PROGRESS NOTES
"Ayesha Patel  1985     Office/Outpatient Visit    Visit Date: Fri, Aug 7, 2020 10:27 am    Provider: Evangelist Barth MD (Assistant: Vicki Herron MA)    Location: [facility]        Electronically signed by Evangelist Barth MD on  2020 11:43:33 AM                             Subjective:        CC: Mrs. Patel is a 35 year old White female.  presents today due to poison ivy phone call [phone]'s encounter is being done with a telehealth visit. She has consented verbally with two witnesses for todays treatment. Todays visit is being conducted by audio only. Individuals present during the telemedicine consultation include patient and Dr. Barth         HPI:       Patient reports that 2 days ago she was cleaning out her mother-in-law's backyard that the become overgrown when she came in contact with poison ivy.  Since that time, she developed a rash in her groin, under her breasts and on her face. It burns and is pruritic.  There is no drainage or open lesions.  She has had poison ivy in the past.  Usually responds to topical therapy but given the extent of the area involved, she thinks she may need \"something stronger.\" The rash is not in close proximity to her eyes.    ROS:     CONSTITUTIONAL:  Negative for chills, fatigue and fever.      CARDIOVASCULAR:  Negative for chest pain and palpitations.      RESPIRATORY:  Negative for recent cough and dyspnea.      GASTROINTESTINAL:  Negative for abdominal pain, diarrhea, nausea and vomiting.      INTEGUMENTARY/BREAST:  Positive for rash.      NEUROLOGICAL:  Negative for headaches.          Past Medical History / Family History / Social History:         Last Reviewed on 2020 11:43 AM by Evangelist Barth    Past Medical History:                 PAST MEDICAL HISTORY             GYNECOLOGICAL HISTORY:     miscarriage 1    Problems with pregnancy have included gestational diabetes; elevated blood pressure.      Contraception: " partner is S/P vasectomy; Hospitalizations: pregnancy         PREVENTIVE HEALTH MAINTENANCE             MAMMOGRAM: Done within last 2 years and results in are chart was last done 08- Neg         Surgical History:     NONE         Family History:     Father: Healthy     Mother:    Positive for Hypertension;     Brother(s): Healthy; 1 brother(s) total     Son(s): Healthy; 1 son(s) total     Maternal Grandfather:    Positive for Cancer (unspecified type);     Maternal Grandmother:    Positive for Hypertension and Myocardial Infarction;         Social History:     Occupation: [employer] sales     Marital Status:      Children: 2 children         Tobacco/Alcohol/Supplements:     Last Reviewed on 8/07/2020 11:43 AM by Evangelist Barth    Tobacco: She has never smoked.          Alcohol: Frequency: Socially     Caffeine:  She admits to consuming caffeine via soda ( 1 servings per week ).          Substance Abuse History:     Last Reviewed on 8/07/2020 11:43 AM by Evangelist Barth    NEGATIVE         Mental Health History:     Last Reviewed on 8/07/2020 11:43 AM by Evangelist Barth        Communicable Diseases (eg STDs):     Last Reviewed on 8/07/2020 11:43 AM by Evangelist Barth        Current Problems:     Last Reviewed on 8/07/2020 11:43 AM by Evangelist Barth    Acne    Essential hypertension    Essential (primary) hypertension    Fatigue    Hypertension    Screening for cholesterol level    Encounter for screening for lipoid disorders    Pure hyperglyceridemia    Elevated cholesterol    Low back pain    Low back pain    Headache    Headache    Other specified disorders of the skin and subcutaneous tissue    Localized skin infection    Unspecified abnormal findings in urine    Pelvic and perineal pain    Dysuria    Unspecified contact dermatitis due to plants, except food    Encounter for screening for depression        Immunizations:     Hep A, adult dose 5/14/2018    Fluzone (3 + years dose) 12/11/2012    Fluzone (3 + years  dose) 11/8/2017    Fluzone Quadrivalent (3+ years) 1/18/2019    Fluzone Quadrivalent (3+ years) 10/1/2019        Allergies:     Last Reviewed on 8/07/2020 11:43 AM by Evangelist Barth    Lisinopril: diarrhea,vomiting,abdominal pain  (Adverse Reaction)        Current Medications:     Last Reviewed on 8/07/2020 11:43 AM by Evangelist Barth    hydroCHLOROthiazide 25 mg oral tablet [1/2 to 1 pill daily]    nystatin 100,000 unit/gram Topical Cream [apply to the affected area(s) by topical route 2 times per day]        Assessment:         L25.5   Unspecified contact dermatitis due to plants, except food           ORDERS:         Meds Prescribed:       [New Rx] predniSONE 10 mg oral tablet [Take 5 pills on days 1-3, 4 pills days 4-6, 3 pills days 7-9, 2 pills days 10-12, 1 pill days 13-15], #45 (forty five) tablets, Refills: 0 (zero)                 Plan:         Unspecified contact dermatitis due to plants, except food- Given patient reports, this is likely contact dermatitis due to poison ivy.  Will start a steroid taper: 50 mg for 3 days, 40 mg for 3 days, 30 mg for 3 days, 20 mg for 3 days and 10 mg for 3 days.  Call or return to clinic for persistent or worsening symptoms.    Telehealth: Verbal consent obtained for visit to occur via phone call; Total time spent was 6 minutes; [id number]--Mymvffmps E/M 5-10 minutes           Prescriptions:       [New Rx] predniSONE 10 mg oral tablet [Take 5 pills on days 1-3, 4 pills days 4-6, 3 pills days 7-9, 2 pills days 10-12, 1 pill days 13-15], #45 (forty five) tablets, Refills: 0 (zero)             Charge Capture:         Primary Diagnosis:     L25.5  Unspecified contact dermatitis due to plants, except food           Orders:      [id number]  [facility]/\[facility] [place] [facility]\"" telephone evaluation 5-10 min  (In-House)

## 2021-05-18 NOTE — PROGRESS NOTES
Ayesha Patel  1985     Office/Outpatient Visit    Visit Date: Tue, Mar 10, 2020 11:25 am    Provider: Gail Joseph N.P. (Assistant: Jennifer Viramontes MA)    Location: [facility]        Electronically signed by Gail Joseph N.P. on  03/10/2020 05:00:33 PM                             Subjective:        CC: Mrs. Patel is a 34 year old White female.  She presents with low back pain, she denies dysuria, frequent urination.          HPI:           Patient complains of low back pain.  The discomfort is most prominent in the lower, left and lower, right lumbar spine.  This radiates to the left and right buttock and bilateral posterior thigh.  She characterizes it as intermittent, moderate in intensity, and aching.  The pain level between 1 and 10 is a 3.  She states that the current episode of pain started 2 days ago.  She does not recall any precipitating event or injury.  She denies any associated symptoms.  She notes some pain relief with heat and Tylenol.  Medical history is significant for ovarian cyst.  Other details: Seems that her pain occurs when she sleeps and she takes the tylenol and it helps..      ROS:     CONSTITUTIONAL:  Negative for fatigue and fever.      CARDIOVASCULAR:  Negative for chest pain, palpitations, tachycardia, orthopnea, and edema.      RESPIRATORY:  Positive for recent cough ( typically dry ).   Negative for dyspnea or frequent wheezing.  recently exposed to flu like illness from family and started tamiflu at onset of her flu like illness / she is better     GASTROINTESTINAL:  Negative for abdominal pain, constipation, diarrhea, nausea and vomiting.      GENITOURINARY:  Positive for dysuria (every once in a while).   Negative for hematuria.      MUSCULOSKELETAL:  Negative for arthralgias.          Past Medical History / Family History / Social History:         Last Reviewed on 3/10/2020 01:16 PM by Gail Joseph    Past Medical History:                  PAST MEDICAL HISTORY             GYNECOLOGICAL HISTORY:     miscarriage 1    Problems with pregnancy have included gestational diabetes; elevated blood pressure.      Contraception: partner is S/P vasectomy; Hospitalizations: pregnancy         PREVENTIVE HEALTH MAINTENANCE             MAMMOGRAM: Done within last 2 years and results in are chart was last done 08- Neg         Surgical History:     NONE         Family History:     Father: Healthy     Mother:    Positive for Hypertension;     Brother(s): Healthy; 1 brother(s) total     Son(s): Healthy; 1 son(s) total     Maternal Grandfather:    Positive for Cancer (unspecified type);     Maternal Grandmother:    Positive for Hypertension and Myocardial Infarction;         Social History:     Occupation: [employer]     Marital Status:      Children: 2 children         Tobacco/Alcohol/Supplements:     Last Reviewed on 3/10/2020 01:16 PM by Gail Joseph    Tobacco: She has never smoked.          Alcohol: Frequency: Socially     Caffeine:  She admits to consuming caffeine via soda ( 1 servings per week ).          Immunizations:     Hep A, adult dose 2018    Fluzone (3 + years dose) 2012    Fluzone (3 + years dose) 2017    Fluzone Quadrivalent (3+ years) 2019    Fluzone Quadrivalent (3+ years) 10/1/2019        Allergies:     Last Reviewed on 3/10/2020 01:16 PM by Gail Joseph    Lisinopril: diarrhea,vomiting,abdominal pain  (Adverse Reaction)        Current Medications:     Last Reviewed on 3/10/2020 01:16 PM by Gail Joseph    hydroCHLOROthiazide 25 mg oral tablet [1/2 to 1 pill daily]    nystatin 100,000 unit/gram Topical Cream [apply to the affected area(s) by topical route 2 times per day]        Objective:        Exams:     PHYSICAL EXAM:     GENERAL: vital signs recorded - well developed, well nourished;  no apparent distress;     RESPIRATORY: normal respiratory rate and pattern with no distress; normal  breath sounds with no rales, rhonchi, wheezes or rubs;     CARDIOVASCULAR: normal rate; rhythm is regular;  no systolic murmur;     MUSCULOSKELETAL: no pain with flexion of back and no lumbar para spinous tenderness; No CVA tenderness     PSYCHIATRIC: appropriate affect and demeanor; normal psychomotor function; normal speech pattern;         Lab/Test Results:         Glucose, Urine: NEGATIVE (mg/dl) (03/10/2020),     Bilirubin, urine: NEGATIVE (NA) (03/10/2020),     Ketones, Urine Strip: NEGATIVE (mg/dl) (03/10/2020),     Specific Gravity, urine: 1.015 (NA) (03/10/2020),     pH, urine: 7.0 (pH UNIT) (03/10/2020),     Urobilinogen, urine: 0.2 (EU/dl) (03/10/2020),     Nitrite, Urine: NEGATIVE (NA) (03/10/2020),     Leukoctyes, urine: SMALL (NA) (03/10/2020),     Appearance: Slightly Cloudy (NA) (03/10/2020),     collection source: Clean Catch (NA) (03/10/2020),     Color: Yellow (NA) (03/10/2020),             Assessment:         M54.5   Low back pain       Z13.31   Encounter for screening for depression       R30.0   Dysuria           ORDERS:         Lab Orders:       [id number]  [facility] - [facility] Urinalysis, automated, with micro  (Send-Out)                      Plan:         Low back painfollow up with patient with 24-48 hours urine culture results.        MEDICATIONS: Over-the-counter medications recommended include tylenol.      RECOMMENDATIONS given include: increase fluid intake.      FOLLOW-UP:.            Orders:       [id number]  [facility] - [facility] Urinalysis, automated, with micro  (Send-Out)              Encounter for screening for depression    MIPS PHQ-9 Depression Screening: Completed form scanned and in chart; Total Score 1         Dysuria2-5 WBC's in urine and 2+ bacteria, urine culture pending; may need to see her GYN again , sees Kathy Pro, saw her 2 weeks ago        RECOMMENDATIONS given include: increase oral fluid intake     FOLLOW-UP: Advised to call if there is no improvement in 2 day(s).              Patient  Recommendations:        For  Low back pain:    Drink plenty of fluids.  Fever increases the loss of fluids and can lead to dehydration.                  APPOINTMENT INFORMATION:        Monday Tuesday Wednesday Thursday Friday Saturday Sunday            Time:___________________AM  PM   Date:_____________________         For  Dysuria:    Increase your intake of oral fluids.              Charge Capture:         Primary Diagnosis:     M54.5  Low back pain           Orders:      [id number]  Office/outpatient visit; established patient, level 4  (In-House)              Z13.31  Encounter for screening for depression     R30.0  Dysuria

## 2021-05-18 NOTE — PROGRESS NOTES
Ayesha PatelNegrito 1985     Office/Outpatient Visit    Visit Date: Tue, Oct 1, 2019 09:06 am    Provider: Dora Dodd N.P. (Assistant: Vicki eHrron MA)    Location: [facility]        Electronically signed by Dora Dodd N.P. on  10/01/2019 12:38:06 PM                             SUBJECTIVE:        CC:     Ms. Patel is a 34 year old White female.  presents today due to neck bump with pain, but has gotten better now         HPI:         PHQ-9 Depression Screening: Completed form scanned and in chart; Total Score 7     Had tender spot on back of her neck last week, unsure if it was swollen lymph node or skin cyst. Denies any symptoms fever, cough, N/V/D/C, sore throat or headache. Kids did have a virus in the house, but area is not sore now and bump is gone.     ROS:     CONSTITUTIONAL:  Negative for chills, fatigue, fever, and weight change.      CARDIOVASCULAR:  Negative for chest pain, orthopnea, paroxysmal nocturnal dyspnea and pedal edema.      RESPIRATORY:  Negative for dyspnea.      GASTROINTESTINAL:  Negative for abdominal pain, constipation, diarrhea, nausea and vomiting.      INTEGUMENTARY/BREAST:  Positive for swollen sore spot on back of neck last week , gone now.      PSYCHIATRIC:  Negative for anxiety, depression, and sleep disturbances.          PMH/FMH/SH:     Last Reviewed on 10/01/2019 12:33 PM by Dora Dodd    Past Medical History:                 PAST MEDICAL HISTORY             GYNECOLOGICAL HISTORY:     miscarriage 1    Problems with pregnancy have included gestational diabetes; elevated blood pressure.      Contraception: partner is S/P vasectomy; Hospitalizations: pregnancy         PREVENTIVE HEALTH MAINTENANCE             MAMMOGRAM: Done within last 2 years and results in are chart was last done 08- Neg         Surgical History:     NONE         Family History:     Father: Healthy     Mother:    Positive for Hypertension;      Brother(s): Healthy; 1 brother(s) total     Son(s): Healthy; 1 son(s) total     Maternal Grandfather:    Positive for Cancer (unspecified type);     Maternal Grandmother:    Positive for Hypertension and Myocardial Infarction;         Social History:     Occupation: [employer] sales     Marital Status:      Children: 2 children         Tobacco/Alcohol/Supplements:     Last Reviewed on 10/01/2019 12:33 PM by Dora Dodd    Tobacco: She has never smoked.      Caffeine:  She admits to consuming caffeine via soda ( 1 servings per week ).          Mental Health History:     Last Reviewed on 10/01/2019 12:33 PM by Dora Dodd            Current Problems:     Last Reviewed on 10/01/2019 12:33 PM by Dora Dodd    Localized skin infection     Low back pain     Elevated cholesterol     Screening for cholesterol level     Hypertension     Fatigue     Essential hypertension     Acne     Screening for depression     Headache         Immunizations:     Hep A, adult dose 5/14/2018     Fluzone (3 + years dose) 12/11/2012     Fluzone (3 + years dose) 11/8/2017     Fluzone Quadrivalent (3+ years) 1/18/2019     Fluzone Quadrivalent (3+ years) 10/1/2019         Allergies:     Last Reviewed on 10/01/2019 12:33 PM by Dora Dodd    Lisinopril: abdominal pain, diarrhea, vomiting (Adverse Reaction)        Current Medications:     Last Reviewed on 10/01/2019 12:33 PM by Dora Dodd    Hydrochlorothiazide (HCTZ) 25mg Tablet 1/2 to 1 pill daily         OBJECTIVE:        Vitals:         Current: 10/1/2019 9:11:06 AM    Ht:  5 ft, 5 in;  Wt: 200.2 lbs;  BMI: 33.3    T: 98.6 F (oral);  BP: 132/81 mm Hg (left arm, sitting);  P: 75 bpm (left arm (BP Cuff), sitting);  sCr: 0.64 mg/dL;  GFR: 138.34        Exams:     PHYSICAL EXAM:     GENERAL: vital signs recorded - well developed, well nourished;  no apparent distress;     E/N/T:  normal EACs, TMs, nasal/oral mucosa, teeth, gingiva, and oropharynx;      RESPIRATORY: normal respiratory rate and pattern with no distress; normal breath sounds with no rales, rhonchi, wheezes or rubs;     CARDIOVASCULAR: normal rate; rhythm is regular;  no systolic murmur; no edema;     GASTROINTESTINAL: nontender; normal bowel sounds; no organomegaly;     BREAST/INTEGUMENT: Posterior neck right and left wnl. no lesion, area swelling or lymph node enlargement;     NEUROLOGIC: GROSSLY INTACT     PSYCHIATRIC:  appropriate affect and demeanor; normal speech pattern; grossly normal memory;         Procedures:     Vaccination against other viral diseases, Influenza     1. Influenza, seasonal PF (children 3 years to adult): 0.5 ml unit dose given IM in the right upper arm; administered by  Regarding contraindications to an Influenza vaccine: Denies moderate/severe illness with/without fever; serious reaction to eggs, egg proteins, gentamicin, gelatin, arginine, neomycin or polymixin; serious reaction after recieving previous influenza vaccines; and history of Guillain-[place] Syndrome.              ASSESSMENT:           V79.0   Z13.31  Screening for depression              DDx:     V04.81   Z23  Vaccination against other viral diseases, Influenza              DDx:     682.9   L98.8  Localized skin infection              DDx:         ORDERS:         Procedures Ordered:       01849  Immunization administration; one vaccine  (In-House)           Other Orders:       94056  Influenza virus vaccine, quadrivalent, split virus, preservative free 3 years of age & older  (In-House)           Depression screen negative  (In-House)                   PLAN:          Screening for depression     MIPS PHQ-9 Depression Screening: Completed form scanned and in chart; Total Score 7; Negative Depression Screen           Orders:         Depression screen negative  (In-House)            Vaccination against other viral diseases, Influenza           Orders:       77511  Immunization administration; one  vaccine  (In-House)         40114  Influenza virus vaccine, quadrivalent, split virus, preservative free 3 years of age & older  (In-House)            Localized skin infection Area has cleared up on its own for now. If returns come to office while present so it can be seen. This could have been lymph node enlargement due to exposure, lamonte without any other symptoms or drainage from the area. dmt             CHARGE CAPTURE:           Primary Diagnosis:     V79.0 Screening for depression            Z13.31    Encounter for screening for depression              Orders:          67210   Office/outpatient visit; established patient, level 3  (In-House)                Depression screen negative  (In-House)           V04.81 Vaccination against other viral diseases, Influenza            Z23    Encounter for immunization              Orders:          99972   Immunization administration; one vaccine  (In-House)             17072   Influenza virus vaccine, quadrivalent, split virus, preservative free 3 years of age & older  (In-House)           682.9 Localized skin infection            L98.8    Other specified disorders of the skin and subcutaneous tissue

## 2021-05-18 NOTE — PROGRESS NOTES
Ayesha Patel  1985     Office/Outpatient Visit    Visit Date: Wed, Oct 28, 2020 01:17 pm    Provider: Kathy Henson N.P. (Assistant: Anita Muñoz MA)    Location: [facility]        Electronically signed by Kathy Henson N.P. on  10/28/2020 09:02:28 PM                             Subjective:        CC: Mrs. Patel is a 35 year old White female.   tested positive , cough, body ache, fever; [phone] [employer]        HPI: televideo visit          Mrs. Patel presents with cough.  It has been present for the past one to two days.  Respiratory symptoms include recent, dry cough.   She denies shortness of breath or wheezing.  Other symptoms include body aches and fever to 100 degrees.  She denies nasal congestion or nasal discharge.  She reports recent exposure to illness from  tested + covid 19 3 days ago.  She has already tried to relieve the symptoms with mucinex.      ROS:     CONSTITUTIONAL:  Positive for fatigue and fever.      E/N/T:  Negative for hearing problems, E/N/T pain, congestion, rhinorrhea, epistaxis, hoarseness, and dental problems.      CARDIOVASCULAR:  Negative for chest pain, palpitations, tachycardia, orthopnea, and edema.      RESPIRATORY:  Positive for recent cough.   Negative for dyspnea or frequent wheezing.      GASTROINTESTINAL:  Negative for abdominal pain, heartburn, constipation, diarrhea, and stool changes.      MUSCULOSKELETAL:  Positive for myalgias.      NEUROLOGICAL:  Negative for dizziness, headaches, paresthesias, and weakness.          Past Medical History / Family History / Social History:         Last Reviewed on 2020 09:25 AM by Sondra Wade    Past Medical History:                 PAST MEDICAL HISTORY             GYNECOLOGICAL HISTORY:     miscarriage 1    Problems with pregnancy have included gestational diabetes; elevated blood pressure.      Contraception: partner is S/P vasectomy;  Hospitalizations: pregnancy         PREVENTIVE HEALTH MAINTENANCE             MAMMOGRAM: Done within last 2 years and results in are chart was last done 08- Neg         Surgical History:     NONE         Family History:     Father: Healthy     Mother:    Positive for Hypertension;     Brother(s): Healthy; 1 brother(s) total     Son(s): Healthy; 1 son(s) total     Maternal Grandfather:    Positive for Cancer (unspecified type);     Maternal Grandmother:    Positive for Hypertension and Myocardial Infarction;         Social History:     Occupation: [employer] sales     Marital Status:      Children: 2 children         Tobacco/Alcohol/Supplements:     Last Reviewed on 8/31/2020 09:25 AM by Sondra Wade    Tobacco: She has never smoked.          Alcohol: Frequency: Socially     Caffeine:  She admits to consuming caffeine via soda ( 1 servings per week ).          Substance Abuse History:     Last Reviewed on 8/31/2020 09:25 AM by Sondra Wade    NEGATIVE         Mental Health History:     Last Reviewed on 8/07/2020 11:43 AM by Evangelist Barth        Communicable Diseases (eg STDs):     Last Reviewed on 8/07/2020 11:43 AM by Evangelist Barth        Current Problems:     Last Reviewed on 10/28/2020 08:59 PM by Kathy Henson    Essential (primary) hypertension    Pure hyperglyceridemia    Unspecified abnormal findings in urine    Encounter for screening for depression    Cough        Immunizations:     Hep A, adult dose 5/14/2018    Fluzone (3 + years dose) 12/11/2012    Fluzone (3 + years dose) 11/8/2017    Fluzone Quadrivalent (3+ years) 1/18/2019    Fluzone Quadrivalent (3+ years) 10/1/2019        Allergies:     Last Reviewed on 8/31/2020 09:25 AM by Sondra Wade    Lisinopril: diarrhea,vomiting,abdominal pain  (Adverse Reaction)        Current Medications:     Last Reviewed on 8/31/2020 09:25 AM by Sondra Wade    hydroCHLOROthiazide 25 mg oral tablet [1/2 to 1 pill daily]    nystatin 100,000  unit/gram Topical Cream [apply to the affected area(s) by topical route 2 times per day]        Objective:        Exams:     PHYSICAL EXAM:     GENERAL:  well developed and nourished; appropriately groomed; in no apparent distress;     NEUROLOGIC: mental status: alert and oriented x 3; GROSSLY INTACT     PSYCHIATRIC:  appropriate affect and demeanor; normal speech pattern; grossly normal memory;         Assessment:         R05   Cough           ORDERS:         Meds Prescribed:       [New Rx] albuterol sulfate 90 mcg/actuation Inhalation HFA Aerosol Inhaler [inhale 1 - 2 puffs (90 - 180 mcg) by inhalation route every 4 hours as needed], #18 (eighteen) grams, Refills: 0 (zero)       [New Rx] promethazine-DM 6.25-15 mg/5 mL oral Syrup [take 5 milliliters by oral route every 4 hours as needed, not to exceed 30 mL in 24 hours], #180 (one hundred and eighty) milliliters, Refills: 0 (zero)                 Plan:         Cough        RECOMMENDATIONS given include: rest, increased oral fluid intake, and symptomatic tx.   is in self quarantine.  waiting to hear from health dept.  wants to wait on covid testing.  does not need work note.  can utilize drive up testing by appt.  if symptoms not stable she is to call back or go to ER if worsens..  Telehealth: Verbal consent obtained for visit to occur via televideo conferencing           Prescriptions:       [New Rx] albuterol sulfate 90 mcg/actuation Inhalation HFA Aerosol Inhaler [inhale 1 - 2 puffs (90 - 180 mcg) by inhalation route every 4 hours as needed], #18 (eighteen) grams, Refills: 0 (zero)       [New Rx] promethazine-DM 6.25-15 mg/5 mL oral Syrup [take 5 milliliters by oral route every 4 hours as needed, not to exceed 30 mL in 24 hours], #180 (one hundred and eighty) milliliters, Refills: 0 (zero)             Patient Recommendations:        For  Cough:    Get plenty of rest. Increase oral fluid intake.              Charge Capture:         Primary Diagnosis:     R05   Cough           Orders:      06217  Office/outpatient visit; established patient, level 3  (In-House)

## 2021-05-18 NOTE — PROGRESS NOTES
Ayesha PatelNegrito 1985     Office/Outpatient Visit    Visit Date:  02:09 pm    Provider: Maegan Harvey N.P. (Assistant: Phylicia Hawk MA)    Location: [facility]        Electronically signed by Maegan Harvey N.P. on  2019 06:53:30 PM                             SUBJECTIVE:        CC:     Ms. Patel is a 33 year old White female.  presents today due to complaints of possible infected hair in vaginal area, also states she is 2-3 weeks late on menstrual cycle         HPI:     Pt complains of late menstrual period. Last period 6 weeks ago. She has Mirena IUD. She also notes a reddened area in vaginal that she thinks might be infected hair. First noticed one week ago. Doing compresses.          ROS:     CONSTITUTIONAL:  Negative for chills and fever.      CARDIOVASCULAR:  Negative for chest pain and palpitations.      RESPIRATORY:  Negative for dyspnea and frequent wheezing.      GASTROINTESTINAL:  Negative for abdominal pain and vomiting.      GENITOURINARY:  Positive for irregular menstrual cycle.   Negative for hematuria.      INTEGUMENTARY/BREAST:  Positive for reddened boil to vaginal area.          PMH/FM/SH:     Last Reviewed on 2018 01:37 PM by Gail Joseph    Past Medical History:                 PAST MEDICAL HISTORY             GYNECOLOGICAL HISTORY:        Problems with pregnancy have included gestational diabetes; elevated blood pressure.      Current method of contraception is IUD; Hospitalizations: pregnancy         PREVENTIVE HEALTH MAINTENANCE             MAMMOGRAM: Done within last 2 years and results in are chart was last done 08- Neg         Surgical History:     NONE         Family History:     Father: Healthy     Mother:    Positive for Hypertension;     Brother(s): Healthy; 1 brother(s) total     Son(s): Healthy; 1 son(s) total     Maternal Grandfather:    Positive for Cancer (unspecified type);     Maternal Grandmother:     Positive for Hypertension and Myocardial Infarction;         Social History:     Occupation: [employer] sales     Marital Status:      Children: 2 children         Tobacco/Alcohol/Supplements:     Last Reviewed on 8/08/2018 01:26 PM by Spurling, Sarah C    Tobacco: She has never smoked.      Caffeine:  She admits to consuming caffeine via soda ( 1 servings per week ).          Substance Abuse History:     Last Reviewed on 11/29/2016 01:25 PM by Jacinda Geiger    NEGATIVE         Mental Health History:     Last Reviewed on 11/29/2016 01:25 PM by Jacinda Geiger        Communicable Diseases (eg STDs):     Last Reviewed on 11/29/2016 01:25 PM by Jacinda Geiger            Current Problems:     Last Reviewed on 11/29/2016 01:25 PM by Jacinda Geiger    Low back pain     Nasal Congestion     Elevated cholesterol     Screening for cholesterol level     Hypertension     Fatigue     Essential hypertension     Acne         Immunizations:     Hep A, adult dose 5/14/2018     Fluzone (3 + years dose) 12/11/2012     Fluzone (3 + years dose) 11/8/2017         Allergies:     Last Reviewed on 8/08/2018 01:25 PM by Spurling, Sarah C    Lisinopril: abdominal pain, diarrhea, vomiting (Adverse Reaction)        Current Medications:     Last Reviewed on 8/08/2018 01:26 PM by Spurling, Sarah C    Etodolac 400mg Tablet One tablet BID with food     Hydrochlorothiazide (HCTZ) 25mg Tablet 1/2 to 1 pill daily     Mirena 52mg Intrauterine System as directed         OBJECTIVE:        Vitals:         Current: 1/18/2019 2:14:10 PM    Ht:  5 ft, 5 in;  Wt: 200.6 lbs;  BMI: 33.4    T: 98.3 F (oral);  BP: 143/67 mm Hg (right arm, sitting);  P: 83 bpm (right arm (BP Cuff), sitting);  sCr: 0.78 mg/dL;  GFR: 114.67        Exams:     PHYSICAL EXAM:     GENERAL: well developed, well nourished;  no apparent distress;     RESPIRATORY: normal respiratory rate and pattern with no distress; normal breath sounds with no rales, rhonchi, wheezes or  rubs;     CARDIOVASCULAR: normal rate; rhythm is regular;     GASTROINTESTINAL: nontender; normal bowel sounds; no organomegaly;     BREAST/INTEGUMENT: SKIN: no significant rashes or lesions; no suspicious moles; boil to vaginal area;     MUSCULOSKELETAL: normal gait;     NEUROLOGIC: mental status: alert and oriented x 3; GROSSLY INTACT     PSYCHIATRIC: appropriate affect and demeanor;         Lab/Test Results:             Pregnancy Test, Urine:  Positive (01/18/2019),     Performed by::  tls (01/18/2019),             ASSESSMENT           V22.1   Z34.81  Positive pregnancy test              DDx:     680.9   L02.92  Skin boil              DDx:         ORDERS:         Meds Prescribed:       Amoxicillin 875mg Tablet One PO BID X 10 days.  #20 (Twenty) tablet(s) Refills: 0         Radiology/Test Orders:       [id number]  Ultrasound, transvaginal  (Send-Out)           Lab Orders:       [id number]  Urine pregnancy test by color comparison  (In-House)         86458  HCGTO- [facility] HCG, Quantitative total  (Send-Out)                   PLAN:          Positive pregnancy test Concern for tubal pregnancy as she has positive pregnancy test with Mirena IUD. Will schedule US today.     LABORATORY:  Labs ordered to be performed today include Pregancy test, Quantitative Serum.      RADIOLOGY:  I have ordered Pelvic Ultrasound Transvaginal to be done today.      RECOMMENDATIONS given include: Further recommendation to be given after test results are complete.      FOLLOW-UP: Schedule follow-up appointments on a p.r.n. basis. Chronic visit follow up           Orders:       46996  Urine pregnancy test by color comparison  (In-House)         31738  HCGTO- [facility] HCG, Quantitative total  (Send-Out)         94733  Ultrasound, transvaginal  (Send-Out)            Skin boil Warm compresses. Sitz baths. Follow up if not improving.           Prescriptions:       Amoxicillin 875mg Tablet One PO BID X 10 days.  #20 (Twenty) tablet(s) Refills: 0              Patient Recommendations:        For  Positive pregnancy test:     Schedule follow-up appointments as needed.              CHARGE CAPTURE           **Please note: ICD descriptions below are intended for billing purposes only and may not represent clinical diagnoses**        Primary Diagnosis:         V22.1 Positive pregnancy test            Z34.81    Encounter for supervision of other normal pregnancy, first trimester              Orders:          36419   Office/outpatient visit; established patient, level 4  (In-House)             12542   Urine pregnancy test by color comparison  (In-House)           680.9 Skin boil            L02.92    Furuncle, unspecified        ADDENDUMS:      ____________________________________    Addendum: 01/21/2019 08:33 AM -          Visit Note Faxed to:        User Entered Recipient; Number [phone]

## 2021-05-18 NOTE — PROGRESS NOTES
Ayesha Patel  1985     Office/Outpatient Visit    Visit Date:  08:59 am    Provider: Sondra Wade N.P. (Assistant: Veronika Sanders MA)    Location: [facility]        Electronically signed by Sondra Wade N.P. on  2020 10:20:09 AM                             Subjective:        CC: Mrs. Patel is a 35 year old White female.  fatigue;         HPI: 36 y/o female presenting to office c/o fatigue that begins late morning/early afternoon. Her  tested positve for COVID on oct 23rd. 3 days later pt lost taste and smell.  No other symptoms but fatigue. Smell and taste have returned, but fatigue is lingering. She would like to make sure that nothing else is going on.    ROS:     CONSTITUTIONAL:  Negative for fever and night sweats.      E/N/T:  Negative for ear pain, nasal congestion, sinus pressure and sore throat.      CARDIOVASCULAR:  Negative for chest pain, palpitations and pedal edema.      RESPIRATORY:  Negative for recent cough and dyspnea.      GASTROINTESTINAL:  Negative for abdominal pain, constipation, diarrhea, nausea and vomiting.      MUSCULOSKELETAL:  Positive for myalgias (Mild - accompanies fatigue).      INTEGUMENTARY/BREAST:  Negative for rash.      NEUROLOGICAL:  Negative for dizziness, paresthesias and weakness.      PSYCHIATRIC:  Negative for anxiety, depression, sleep disturbance and suicidal thoughts.          Past Medical History / Family History / Social History:         Last Reviewed on 2020 09:20 AM by Sondra Wade    Past Medical History:                 PAST MEDICAL HISTORY             GYNECOLOGICAL HISTORY:     miscarriage 1    Problems with pregnancy have included gestational diabetes; elevated blood pressure.      Contraception: partner is S/P vasectomy; Hospitalizations: pregnancy         PREVENTIVE HEALTH MAINTENANCE             MAMMOGRAM: Done within last 2 years and results in are chart was last  done 08- Neg         Surgical History:     NONE         Family History:     Father: Healthy     Mother:    Positive for Hypertension;     Brother(s): Healthy; 1 brother(s) total     Son(s): Healthy; 1 son(s) total     Maternal Grandfather:    Positive for Cancer (unspecified type);     Maternal Grandmother:    Positive for Hypertension and Myocardial Infarction;         Social History:     Occupation: [employer] sales     Marital Status:      Children: 2 children         Tobacco/Alcohol/Supplements:     Last Reviewed on 11/20/2020 09:20 AM by Sondra Wade    Tobacco: She has never smoked.          Alcohol: Frequency: Socially     Caffeine:  She admits to consuming caffeine via soda ( 1 servings per week ).          Substance Abuse History:     Last Reviewed on 11/20/2020 09:20 AM by Sondra Wade    NEGATIVE         Mental Health History:     Last Reviewed on 11/20/2020 09:20 AM by Sondra Wade    NEGATIVE         Communicable Diseases (eg STDs):     Last Reviewed on 8/07/2020 11:43 AM by Evangelist Barth        Immunizations:     Hep A, adult dose 5/14/2018    Fluzone (3 + years dose) 12/11/2012    Fluzone (3 + years dose) 11/8/2017    Fluzone Quadrivalent (3+ years) 1/18/2019    Fluzone Quadrivalent (3+ years) 10/1/2019    influenza, injectable, quadrivalent, preservative free 10/21/2020        Allergies:     Last Reviewed on 11/20/2020 09:20 AM by Sondra Wade    Lisinopril: diarrhea,vomiting,abdominal pain  (Adverse Reaction)        Current Medications:     Last Reviewed on 11/20/2020 09:20 AM by Sondra Wade    hydroCHLOROthiazide 25 mg oral tablet [1/2 to 1 pill daily]    nystatin 100,000 unit/gram Topical Cream [apply to the affected area(s) by topical route 2 times per day]        Objective:        Vitals:         Current: 11/20/2020 9:05:40 AM    Ht:  5 ft, 5 in;  Wt: 183.8 lbs;  BMI: 30.6T: 97.8 F (temporal);  BP: 121/76 mm Hg (left arm, sitting);  P: 70 bpm (left arm (BP  Cuff), sitting);  sCr: 0.64 mg/dL;  GFR: 132.17        Exams:     PHYSICAL EXAM:     GENERAL: vital signs recorded - well developed, well nourished;  well groomed;  no apparent distress, tired-appearing;     EYES: extraocular movements intact; conjunctiva and cornea are normal; PERRLA;     NECK: range of motion is normal; thyroid exam reveals not enlarged;     RESPIRATORY: normal respiratory rate and pattern with no distress; normal breath sounds with no rales, rhonchi, wheezes or rubs;     CARDIOVASCULAR: normal rate; rhythm is regular;  no systolic murmur; no edema;     LYMPHATIC: no enlargement of cervical or facial nodes;     MUSCULOSKELETAL: normal gait; normal overall tone     NEUROLOGIC: mental status: alert and oriented x 3;     PSYCHIATRIC:  appropriate affect and demeanor; normal speech pattern; grossly normal memory;         Assessment:         R53.83   Other fatigue           ORDERS:         Lab Orders:       [id number]  VB12 - [facility] Vitamin B12  (Send-Out)            07091  BDCBC - [facility] CBC with 3 part diff  (Send-Out)            14835  COMP - [facility] Comp. Metabolic Panel  (Send-Out)            52395  EBVAP - [facility] EBV Acute Infection Antibodies  (Send-Out)            07049  LPDP - [facility] Lipid Panel  (Send-Out)            07946  TSH - [facility] TSH  (Send-Out)            35455  VITD - [facility] Vitamin D, 25 Hydroxy  (Send-Out)                      Plan:         Other fatigueWill r/o vit deficiencies and EBV. Explained to pt that the fatigue is likely from recent virus. Pt to stay hydrated and rest as much as possible. Will notify pt of results. Pt to notify office with any acute concerns or issues. Pt v/u and had no further questions upon d/c.     LABORATORY:  Labs ordered to be performed today include B12, CBC, Comprehensive metabolic panel, EBV Profile Mono, lipid panel, TSH, and Vitamin D.            Orders:       [id number]  VB12 - [facility] Vitamin B12  (Send-Out)            85332  BDCBC - H CBC with 3 part diff  (Send-Out)             32920  COMP - H Comp. Metabolic Panel  (Send-Out)            50603  EBVAP - [facility] EBV Acute Infection Antibodies  (Send-Out)            96511  LPDP - [facility] Lipid Panel  (Send-Out)            91838  TSH - [facility] TSH  (Send-Out)            06160  VITD - [facility] Vitamin D, 25 Hydroxy  (Send-Out)                  Charge Capture:         Primary Diagnosis:     R53.83  Other fatigue           Orders:      28164  Office/outpatient visit; established patient, level 3  (In-House)

## 2021-05-18 NOTE — PROGRESS NOTES
Ayesha PatelNegrito 1985     Office/Outpatient Visit    Visit Date:  01:35 pm    Provider: Jacinda Geiger N.P. (Assistant: Miri Martinez MA)    Location: [facility]        Electronically signed by Jacinda Geiger N.P. on  2018 02:08:47 PM                             SUBJECTIVE:        CC:     Ms. Patel is a 33 year old White female.  Dysuria;         HPI: LMP  - Denies pregnancy         Patient complains of dysuria.  This began within the last 3 weeks.  Associated symptoms include abdominal pain ( pressure-like ) and urinary frequency.  She denies associated fever, flank pain, hematuria, urgency, urinary incontinence or vaginal discharge.  Ms. Patel states that she had multiple prior episodes of similar discomfort.  She denies a history of current pregnancy.      ROS:     CONSTITUTIONAL:  Negative for chills, fatigue and fever.      CARDIOVASCULAR:  Negative for chest pain and pedal edema.      RESPIRATORY:  Negative for recent cough and dyspnea.      GASTROINTESTINAL:  Negative for abdominal pain, constipation, diarrhea, heartburn, nausea and vomiting.      GENITOURINARY:  Negative for dysuria and change in urine stream.      MUSCULOSKELETAL:  Negative for arthralgias and myalgias.      INTEGUMENTARY/BREAST:  Negative for pruritis and rash.      NEUROLOGICAL:  Negative for dizziness, fainting, headaches and paresthesias.      ENDOCRINE:  Negative for hair loss, polydipsia and polyphagia.      ALLERGIC/IMMUNOLOGIC:  Negative for seasonal allergies.      PSYCHIATRIC:  Negative for anxiety, depression and suicidal thoughts.          PMH/FMH/SH:     Last Reviewed on 2018 11:58 AM by Gail Joseph    Past Medical History:                 PAST MEDICAL HISTORY             GYNECOLOGICAL HISTORY:        Problems with pregnancy have included gestational diabetes; elevated blood pressure.      Current method of contraception is IUD;  Hospitalizations: pregnancy         Surgical History:     NONE         Family History:     Father: Healthy     Mother:    Positive for Hypertension;     Brother(s): Healthy; 1 brother(s) total     Son(s): Healthy; 1 son(s) total     Maternal Grandfather:    Positive for Cancer (unspecified type);     Maternal Grandmother:    Positive for Hypertension and Myocardial Infarction;         Social History:     Occupation: [employer] sales     Marital Status:      Children: 2 children         Tobacco/Alcohol/Supplements:     Last Reviewed on 5/14/2018 11:50 AM by Manjula Bahena    Tobacco: She has never smoked.      Caffeine:  She admits to consuming caffeine via soda ( 1 servings per week ).          Substance Abuse History:     Last Reviewed on 11/29/2016 01:25 PM by Jacinda Geiger    NEGATIVE         Mental Health History:     Last Reviewed on 11/29/2016 01:25 PM by Jacinda Geiger        Communicable Diseases (eg STDs):     Last Reviewed on 11/29/2016 01:25 PM by Jacinda Geiger            Current Problems:     Last Reviewed on 11/29/2016 01:25 PM by Jacinda Geiger    Nasal Congestion     Elevated cholesterol     Screening for cholesterol level     Hypertension     Fatigue     Essential hypertension     Acne         Immunizations:     Hep A, adult dose 5/14/2018     Fluzone (3 + years dose) 12/11/2012     Fluzone (3 + years dose) 11/8/2017         Allergies:     Last Reviewed on 5/14/2018 11:50 AM by Manjula Bahena    Lisinopril: abdominal pain, diarrhea, vomiting (Adverse Reaction)        Current Medications:     Last Reviewed on 5/14/2018 11:50 AM by Manjula Bahena    Hydrochlorothiazide (HCTZ) 25mg Tablet 1/2 to 1 pill daily     Mirena 52mg Intrauterine System as directed         OBJECTIVE:        Vitals:         Current: 7/16/2018 1:43:25 PM    Ht:  5 ft, 5 in;  Wt: 190.4 lbs;  BMI: 31.7    T: 98.7 F (oral);  BP: 127/78 mm Hg (left arm, sitting);  P: 80 bpm (left arm (BP Cuff), sitting);  sCr: 0.78  mg/dL;  GFR: 112.15        Exams:     PHYSICAL EXAM:     GENERAL: vital signs recorded - well developed, well nourished;  no apparent distress;     NECK: range of motion is normal;     RESPIRATORY: normal appearance and symmetric expansion of chest wall; normal respiratory rate and pattern with no distress; normal breath sounds with no rales, rhonchi, wheezes or rubs;     CARDIOVASCULAR: normal rate; rhythm is regular;  no edema;     MUSCULOSKELETAL: normal gait; normal range of motion of all major muscle groups; no limb or joint pain with range of motion; No CVA tenderness     NEUROLOGIC: mental status: alert and oriented x 3;     PSYCHIATRIC: appropriate affect and demeanor; normal speech pattern; normal thought and perception;         Lab/Test Results:             Glucose, Urine:  Neg (07/16/2018),     Bilirubin, urine:  Negative (07/16/2018),     Ketones, Urine Strip:  Negative (07/16/2018),     Specific Gravity, urine:  1.025 (07/16/2018),     Blood in Urine:  trace (07/16/2018),     pH, urine:  7.0 (07/16/2018),     Protein Urine QL:  negative (07/16/2018),     Urobilinogen, urine:  0.2 E.U./dL (07/16/2018),     Nitrite, Urine:  Negative (07/16/2018),     Leukoctyes, urine:  Trace (07/16/2018),     Appearance:  Clear (07/16/2018),     collection source:  Clean-catch (07/16/2018),     Color:  Yellow (07/16/2018),     Performed by::  [facility] (07/16/2018),             ASSESSMENT           788.1   N39.0  Dysuria              DDx:         ORDERS:         Meds Prescribed:       Macrobid (Nitrofurantoin) 100mg Capsules one BID x 3 days  #6 (Six) capsule(s) Refills: 0         Lab Orders:       [id number]  Urinalysis, automated, without microscopy  (In-House)         92364  [facility]  ([facility]                   PLAN:          Dysuria     LABORATORY:  Labs ordered to be performed today include Urine culture.      RECOMMENDATIONS given include: increase fluid intake and avoid caffeine  -will call if need to make  adjustmemnts to medications - otherwise call if still with symptoms after completion of antibiotic.      FOLLOW-UP: Advised to call if there is no improvement 3 days.  .            Prescriptions:       Macrobid (Nitrofurantoin) 100mg Capsules one BID x 3 days  #6 (Six) capsule(s) Refills: 0           Orders:       [id number]  Urinalysis, automated, without microscopy  (In-House)         72617  [facility] Urine Culture  (Send-Out)             Patient Education Handouts:       Urinary Tract Infection (UTI)              Patient Recommendations:        For  Dysuria:     Drink plenty of fluids.  Fever increases the loss of fluids and can lead to dehydration. I also recommend avoid caffeine  -will call if need to make adjustmemnts to medications - otherwise call if still with symptoms after completion of antibiotic.  Follow-up by phone if no improvement in 3 days.                APPOINTMENT INFORMATION:        Monday Tuesday Wednesday Thursday Friday Saturday Sunday            Time:___________________AM  PM   Date:_____________________             CHARGE CAPTURE           **Please note: ICD descriptions below are intended for billing purposes only and may not represent clinical diagnoses**        Primary Diagnosis:         788.1 Dysuria            N39.0    Urinary tract infection, site not specified              Orders:          [id number]   Office/outpatient visit; established patient, level 3  (In-House)             68208   Urinalysis, automated, without microscopy  (In-House)

## 2021-05-18 NOTE — PROGRESS NOTES
"Ayesha Patel  1985     Office/Outpatient Visit    Visit Date:  03:37 pm    Provider: Sondra Wade N.P. (Assistant: Manjula Bahena RN)    Location: [facility]        Electronically signed by Sondra Wade N.P. on  2021 05:55:42 PM                             Subjective:        CC: Mrs. Patel is a 35 year old White female.  follow up;         HPI: 34 y/o female presenting to office with multiple complaints. She had f/u with ENT and he reassured pt that lymph nodes looked reactive and thyroid cyst was benign. She states he did relieve some of her anxiety. She has also noted a knot behind left knee, just on lower posterior thigh. Not tender. It was noticed approximately 3-4 days ago. She also describes a \"pulling\" sensation to chest and anmol lateral sides of neck with deep breath in the last 3-4 days. Her CT is pending approval for C spine. Results reviewed with pt (c spine xray)    ROS:     CONSTITUTIONAL:  Negative for chills, fatigue and fever.      CARDIOVASCULAR:  Negative for chest pain, palpitations and pedal edema.      RESPIRATORY:  Positive for dyspnea and pleuritic chest pain.   Negative for recent cough or frequent wheezing.      GASTROINTESTINAL:  Negative for abdominal pain, constipation, diarrhea, nausea and vomiting.      MUSCULOSKELETAL:  Positive for back pain.      INTEGUMENTARY/BREAST:  Negative for rash.      NEUROLOGICAL:  Negative for dizziness, paresthesias and weakness.      PSYCHIATRIC:  Positive for feelings of stress.   Negative for anxiety, depression, sleep disturbance or suicidal thoughts.          Past Medical History / Family History / Social History:         Last Reviewed on 2021 03:47 PM by Sondra Wade    Past Medical History:                 PAST MEDICAL HISTORY             GYNECOLOGICAL HISTORY:     miscarriage 1    Problems with pregnancy have included gestational diabetes; elevated blood pressure.  "     Contraception: partner is S/P vasectomy; Hospitalizations: pregnancy         PREVENTIVE HEALTH MAINTENANCE             MAMMOGRAM: Done within last 2 years and results in are chart was last done 08- Neg         Surgical History:     NONE         Family History:     Father: Healthy     Mother:    Positive for Hypertension;     Brother(s): Healthy; 1 brother(s) total     Son(s): Healthy; 1 son(s) total     Maternal Grandfather:    Positive for Cancer (unspecified type);     Maternal Grandmother:    Positive for Hypertension and Myocardial Infarction;         Social History:     Occupation: [employer] sales     Marital Status:      Children: 2 children         Tobacco/Alcohol/Supplements:     Last Reviewed on 1/04/2021 03:47 PM by Sondra Wade    Tobacco: She has never smoked.          Alcohol: Frequency: Socially     Caffeine:  She admits to consuming caffeine via soda ( 1 servings per week ).          Substance Abuse History:     Last Reviewed on 1/04/2021 03:47 PM by Sondra Wade    NEGATIVE         Mental Health History:     Last Reviewed on 1/04/2021 03:47 PM by Sondra Wade    NEGATIVE         Communicable Diseases (eg STDs):     Last Reviewed on 1/04/2021 03:47 PM by Sondra Wade        Immunizations:     influenza, injectable, quadrivalent, preservative free 10/21/2020    Hep A, adult dose 5/14/2018    Fluzone (3 + years dose) 12/11/2012    Fluzone (3 + years dose) 11/8/2017    Fluzone Quadrivalent (3+ years) 1/18/2019    Fluzone Quadrivalent (3+ years) 10/1/2019        Allergies:     Last Reviewed on 1/04/2021 03:47 PM by Sondra Wade    Lisinopril: diarrhea,vomiting,abdominal pain  (Adverse Reaction)        Current Medications:     Last Reviewed on 1/04/2021 03:47 PM by Sondra Wade    hydroCHLOROthiazide 25 mg oral tablet [ 1 pill daily]    nystatin 100,000 unit/gram Topical Cream [apply to the affected area(s) by topical route 2 times per day]        Objective:         Vitals:         Current: 1/4/2021 3:43:02 PM    Ht:  5 ft, 5 in;  Wt: 181.6 lbs;  BMI: 30.2T: 98.4 F (temporal);  BP: 133/82 mm Hg (left arm, sitting);  P: 84 bpm (left arm (BP Cuff), sitting);  sCr: 0.68 mg/dL;  GFR: 123.76        Repeat:     4:14:15 PM  BP:   136/79mm Hg (left arm, sitting, pulse-83)     Exams:     PHYSICAL EXAM:     GENERAL: vital signs recorded - well developed, well nourished;  well groomed;  no apparent distress;     EYES: extraocular movements intact; conjunctiva and cornea are normal; PERRLA;     NECK: range of motion is normal; trachea is midline; thyroid exam reveals not enlarged;     RESPIRATORY: normal respiratory rate and pattern with no distress; normal breath sounds with no rales, rhonchi, wheezes or rubs;     CARDIOVASCULAR: normal rate; rhythm is regular;  no systolic murmur; no edema;     LYMPHATIC: no enlargement of cervical or facial nodes;     BREAST/INTEGUMENT: No rash, jaundice;     MUSCULOSKELETAL: normal gait; normal overall tone small rubbery nodule to left lower posterior thigh. Non tender. No erythema or warmth.;     NEUROLOGIC: mental status: alert and oriented x 3;     PSYCHIATRIC: affect/demeanor: anxious;  normal psychomotor function; normal speech pattern; normal thought and perception;         Assessment:         M79.652   Pain in left thigh       R59.9   Enlarged lymph nodes, unspecified       M54.2   Cervicalgia           ORDERS:         Radiology/Test Orders:       [id number]  Venous doppler left extremity  (Send-Out)              Lab Orders:       [id number]  [facility] CBC with 3 part diff  (Send-Out)            [id number]  COMP - [facility] Comp. Metabolic Panel  (Send-Out)            [id number]  D-DIM - [facility] D-Dimer  (Send-Out)                      Plan:         Pain in left thighWill send pt for venous doppler to r/o clot. Will notify pt of lab results. Pt v/u and agrees with plan of care.     LABORATORY:  Labs ordered to be performed today include CBC, Comprehensive metabolic  panel, and D-Dimer.      RADIOLOGY:  I have ordered Venous doppler left extremity to be done today.            Orders:       [id number]  Venous doppler left extremity  (Send-Out)            91407  BDCBC - [facility] CBC with 3 part diff  (Send-Out)            06231  COMP - [facility] Comp. Metabolic Panel  (Send-Out)            75138  D-DIM - [facility] D-Dimer  (Send-Out)              Enlarged lymph nodes, unspecifiedPt states they are subsiding. Will wait until closer to ultrasound appt to determine if she would like to keep.         CervicalgiaReviewed xray report. Sent pt to referrals for update on insurance. May send pt to neuro sx or physical therapy depending on CT report.             Charge Capture:         Primary Diagnosis:     M79.652  Pain in left thigh           Orders:      [id number]  Office/outpatient visit; established patient, level 4  (In-House)              R59.9  Enlarged lymph nodes, unspecified     M54.2  Cervicalgia

## 2021-05-18 NOTE — PROGRESS NOTES
Ayesha PatelNegrito 1985     Office/Outpatient Visit    Visit Date: Mon, May 14, 2018 11:48 am    Provider: Gail Joseph N.P. (Assistant: Manjula Bahena RN)    Location: [facility]        Electronically signed by Gail Joseph N.P. on  2018 12:41:04 PM                             SUBJECTIVE:        CC:     Ms. Patel is a 32 year old White female.  Medication Refill;         HPI:         Ms. Patel presents with hypertension.  Her current cardiac medication regimen includes a diuretic ( HCTZ ).  Ms. Patel does not check her blood pressure other than at her clinic appointments.  She is tolerating the medication well without side effects.  Compliance with treatment has been good; she takes her medication as directed.      ROS:     CONSTITUTIONAL:  Positive for unintentional weight gain.  did take phentermine in the past but does not want to take that rx again     CARDIOVASCULAR:  Negative for chest pain, palpitations, tachycardia, orthopnea, and edema.      RESPIRATORY:  Negative for cough, dyspnea, and hemoptysis.      NEUROLOGICAL:  Negative for dizziness, headaches, paresthesias, and weakness.          PM/FM/:     Last Reviewed on 2018 11:58 AM by Gail Joseph    Past Medical History:                 PAST MEDICAL HISTORY             GYNECOLOGICAL HISTORY:        Problems with pregnancy have included gestational diabetes; elevated blood pressure.      Current method of contraception is IUD; Hospitalizations: pregnancy         Surgical History:     NONE         Family History:     Father: Healthy     Mother:    Positive for Hypertension;     Brother(s): Healthy; 1 brother(s) total     Son(s): Healthy; 1 son(s) total     Maternal Grandfather:    Positive for Cancer (unspecified type);     Maternal Grandmother:    Positive for Hypertension and Myocardial Infarction;         Social History:     Occupation: [employer] sales     Marital Status:       Children: 2 children         Tobacco/Alcohol/Supplements:     Last Reviewed on 5/14/2018 11:50 AM by Manjula Bahena    Tobacco: She has never smoked.      Caffeine:  She admits to consuming caffeine via soda ( 1 servings per week ).              Immunizations:     Fluzone (3 + years dose) 12/11/2012     Fluzone (3 + years dose) 11/8/2017         Allergies:     Last Reviewed on 5/14/2018 11:50 AM by Manjula Bahena    Lisinopril: abdominal pain, diarrhea, vomiting (Adverse Reaction)        Current Medications:     Last Reviewed on 5/14/2018 11:50 AM by Manjula Bahena    Hydrochlorothiazide (HCTZ) 25mg Tablet 1/2 to 1 pill daily     Mirena 52mg Intrauterine System as directed         OBJECTIVE:        Vitals:         Current: 5/14/2018 11:50:11 AM    Ht:  5 ft, 5 in;  Wt: 191.8 lbs;  BMI: 31.9    T: 97.4 F (oral);  BP: 137/89 mm Hg (left arm, sitting);  P: 62 bpm (left arm (BP Cuff), sitting);  sCr: 0.78 mg/dL;  GFR: 113.54        Exams:     PHYSICAL EXAM:     GENERAL: vital signs recorded - well developed, well nourished;  no apparent distress;     NECK: carotid exam reveals no bruits;     RESPIRATORY: normal respiratory rate and pattern with no distress; normal breath sounds with no rales, rhonchi, wheezes or rubs;     CARDIOVASCULAR: normal rate; rhythm is regular;  no systolic murmur; no edema;     PSYCHIATRIC:  appropriate affect and demeanor; normal speech pattern; grossly normal memory;         Procedures:     Vaccination against hepatitis A     1. Hepatitis A (adult): 1.0 ml given IM in the right upper arm; administered by [facility]             ASSESSMENT           401.1   I10  Hypertension              DDx:     V05.3   Z23  Vaccination against hepatitis A              DDx:         ORDERS:         Meds Prescribed:       Refill of: Hydrochlorothiazide (HCTZ) 25mg Tablet 1/2 to 1 pill daily  #90 (Ninety) tablet(s) Refills: 1         Lab Orders:       [id number]  COMP - [facility] Comp. Metabolic Panel  (Send-Out)         87232   [facility] TSH  (Send-Out)           Procedures Ordered:       [id number]  Immunization administration; one vaccine  (In-House)         34691  HepA vaccine adult dose for intramuscular use  (In-House)                   PLAN:          Hypertension discussed diet and weight loss rx's, not fasting today, will check some labs     LABORATORY:  Labs ordered to be performed today include Comprehensive metabolic panel and TSH.      RECOMMENDATIONS given include: perform routine monitoring of blood pressure with home blood pressure cuff, reduction of dietary salt intake, and weight loss.      FOLLOW-UP: recheck BP           Prescriptions:       Refill of: Hydrochlorothiazide (HCTZ) 25mg Tablet 1/2 to 1 pill daily  #90 (Ninety) tablet(s) Refills: 1           Orders:       [id number]  [facility] Comp. Metabolic Panel  (Send-Out)         40171  TSH [facility] TSH  (Send-Out)             Patient Education Handouts:       DASH Diet           Vaccination against hepatitis A           Orders:       [id number]  Immunization administration; one vaccine  (In-House)         28473  HepA vaccine adult dose for intramuscular use  (In-House)               Patient Recommendations:        For  Hypertension:     Begin monitoring your blood pressure by brief nurse visits at our office, a home blood pressure monitor, or by checking on the machines in pharmacies or stores.  Keep a log of the readings. Reduce the amount of salt in your diet. Try to lose some weight; even modest weight reduction can improve your blood pressure.              CHARGE CAPTURE           **Please note: ICD descriptions below are intended for billing purposes only and may not represent clinical diagnoses**        Primary Diagnosis:         401.1 Hypertension            I10    Essential (primary) hypertension              Orders:          [id number]   Office/outpatient visit; established patient, level 4  (In-House)           V05.3 Vaccination against hepatitis A            Z23    Encounter for  immunization              Orders:          37953   Immunization administration; one vaccine  (In-House)             38954   HepA vaccine adult dose for intramuscular use  (In-House)

## 2021-05-18 NOTE — PROGRESS NOTES
Ayesha Patel  1985     Office/Outpatient Visit    Visit Date:  01:16 pm    Provider: Gail Joseph N.P. (Assistant: Vicki Herron MA)    Location: [facility]        Electronically signed by Gail Joseph N.P. on  2020 02:15:24 PM                             Subjective:        CC: Ms. Patel is a 34 year old White female.  This is a follow-up visit.  check up on bp med;         HPI:           Patient presents with essential (primary) hypertension.  Her current cardiac medication regimen includes a diuretic ( Hydrochlorothiazide (HCTZ) ).  Ms. Patel does not check her blood pressure other than at her clinic appointments.  She hopes to get off medication.      ROS:     CONSTITUTIONAL:  Positive for intentional weight loss, working on diet/ exercise.      CARDIOVASCULAR:  Negative for chest pain, palpitations, tachycardia, orthopnea, and edema.      RESPIRATORY:  Negative for cough, dyspnea, and hemoptysis.      INTEGUMENTARY/BREAST:  Positive for gets a rash under her breast at times, needs rx refilled.  (is meeting a surgeon next month and is  looking to have breast reduction surgery)     NEUROLOGICAL:  Negative for dizziness, headaches, paresthesias, and weakness.          Past Medical History / Family History / Social History:         Last Reviewed on 2020 01:38 PM by Gail Joseph    Past Medical History:                 PAST MEDICAL HISTORY             GYNECOLOGICAL HISTORY:     miscarriage 1    Problems with pregnancy have included gestational diabetes; elevated blood pressure.      Contraception: partner is S/P vasectomy; Hospitalizations: pregnancy         PREVENTIVE HEALTH MAINTENANCE             MAMMOGRAM: Done within last 2 years and results in are chart was last done 08- Neg         Surgical History:     NONE         Family History:     Father: Healthy     Mother:    Positive for Hypertension;     Brother(s):  Healthy; 1 brother(s) total     Son(s): Healthy; 1 son(s) total     Maternal Grandfather:    Positive for Cancer (unspecified type);     Maternal Grandmother:    Positive for Hypertension and Myocardial Infarction;         Social History:     Occupation: [employer] sales     Marital Status:      Children: 2 children         Tobacco/Alcohol/Supplements:     Last Reviewed on 1/28/2020 01:20 PM by Vicki Herron    Tobacco: She has never smoked.          Alcohol: Frequency: Socially     Caffeine:  She admits to consuming caffeine via soda ( 1 servings per week ).          Immunizations:     Hep A, adult dose 5/14/2018    Fluzone (3 + years dose) 12/11/2012    Fluzone (3 + years dose) 11/8/2017    Fluzone Quadrivalent (3+ years) 1/18/2019    Fluzone Quadrivalent (3+ years) 10/1/2019        Allergies:     Last Reviewed on 1/28/2020 01:20 PM by Vicki Herron    Lisinopril: diarrhea,vomiting,abdominal pain  (Adverse Reaction)        Current Medications:     Last Reviewed on 1/28/2020 01:20 PM by Vicki Herron    hydroCHLOROthiazide 25 mg oral tablet [1/2 to 1 pill daily]        Objective:        Vitals:         Current: 1/28/2020 1:22:06 PM    Ht:  5 ft, 5 in;  Wt: 191.8 lbs;  BMI: 31.9T: 98.6 F (oral);  BP: 122/78 mm Hg (left arm, sitting);  P: 71 bpm (left arm (BP Cuff), sitting);  sCr: 0.64 mg/dL;  GFR: 135.84        Exams:     PHYSICAL EXAM:     GENERAL: vital signs recorded - well developed, well nourished;  no apparent distress;     NECK: carotid exam reveals no bruits;     RESPIRATORY: normal respiratory rate and pattern with no distress; normal breath sounds with no rales, rhonchi, wheezes or rubs;     CARDIOVASCULAR: normal rate; rhythm is regular;  no systolic murmur; no edema;     PSYCHIATRIC:  appropriate affect and demeanor; normal speech pattern; grossly normal memory;         Assessment:         I10   Essential (primary) hypertension       L98.8   Other specified disorders of the skin  and subcutaneous tissue           ORDERS:         Meds Prescribed:       [Refilled] hydroCHLOROthiazide 25 mg oral tablet [1/2 to 1 pill daily], #90 (ninety) tablets, Refills: 0 (zero)       [New Rx] nystatin 100,000 unit/gram Topical Cream [apply to the affected area(s) by topical route 2 times per day], #30 (thirty) grams, Refills: 2 (two)         Lab Orders:       [id number]  [facility] Basic Metabolic Panel  (Send-Out)                      Plan:         Essential (primary) hypertensionshe will be getting new insurance/is going to try to wean off her BP rx/ to fast before her next appt and will get lipid panel as well    LABORATORY:  Labs ordered to be performed today include basic metabolic panel.      RECOMMENDATIONS given include: reduction of dietary salt intake, continue working on diet and regular exercise, and Advised about free BP checks on the last Saturday of each month.      FOLLOW-UP: Schedule follow-up appointments on a p.r.n. basis.            Prescriptions:       [Refilled] hydroCHLOROthiazide 25 mg oral tablet [1/2 to 1 pill daily], #90 (ninety) tablets, Refills: 0 (zero)           Orders:       [id number]  [facility] Basic Metabolic Panel  (Send-Out)              Other specified disorders of the skin and subcutaneous tissuenystatin           Prescriptions:       [New Rx] nystatin 100,000 unit/gram Topical Cream [apply to the affected area(s) by topical route 2 times per day], #30 (thirty) grams, Refills: 2 (two)             Patient Recommendations:        For  Essential (primary) hypertension:    Reduce the amount of salt in your diet.  Schedule follow-up appointments as needed.              Charge Capture:         Primary Diagnosis:     I10  Essential (primary) hypertension           Orders:      [id number]  Office/outpatient visit; established patient, level 3  (In-House)              L98.8  Other specified disorders of the skin and subcutaneous tissue

## 2021-05-18 NOTE — PROGRESS NOTES
"Ayesha Patel  1985     Office/Outpatient Visit    Visit Date: Tue, Dec 15, 2020 03:36 pm    Provider: Sondra Wade N.P. (Assistant: Phylicia Hawk MA)    Location: [facility]        Electronically signed by Sondra Wade N.P. on  12/15/2020 11:34:15 PM                             Subjective:        CC: Mrs. Patel is a 35 year old White female.  Patient presents today with complaints of neck and shoulder pain X \"years\", no known injury;         HPI: 35-year-old female presenting to office c/o \"lump\" to right shoulder, just at the base of her neck that she has noticed for years, but has gotten bigger recently. She also has noticed swollen lymph nodes around neck. She did however just get over COVID approximately 6 -7 weeks ago. No other acute complaints at this time.     ROS:     CONSTITUTIONAL:  Negative for fatigue and fever.      EYES:  Negative for blurred vision.      E/N/T:  Negative for ear pain, nasal congestion, sinus pressure and sore throat.      CARDIOVASCULAR:  Negative for chest pain and palpitations.      RESPIRATORY:  Negative for recent cough and dyspnea.      GASTROINTESTINAL:  Negative for abdominal pain, diarrhea, nausea and vomiting.      MUSCULOSKELETAL:  Negative for myalgias.      INTEGUMENTARY/BREAST:  Negative for rash.      NEUROLOGICAL:  Negative for dizziness, paresthesias and weakness.          Past Medical History / Family History / Social History:         Last Reviewed on 12/15/2020 03:52 PM by Sondra Wade    Past Medical History:                 PAST MEDICAL HISTORY             GYNECOLOGICAL HISTORY:     miscarriage 1    Problems with pregnancy have included gestational diabetes; elevated blood pressure.      Contraception: partner is S/P vasectomy; Hospitalizations: pregnancy         PREVENTIVE HEALTH MAINTENANCE             MAMMOGRAM: Done within last 2 years and results in are chart was last done 08- Neg         " Surgical History:     NONE         Family History:     Father: Healthy     Mother:    Positive for Hypertension;     Brother(s): Healthy; 1 brother(s) total     Son(s): Healthy; 1 son(s) total     Maternal Grandfather:    Positive for Cancer (unspecified type);     Maternal Grandmother:    Positive for Hypertension and Myocardial Infarction;         Social History:     Occupation: [employer] sales     Marital Status:      Children: 2 children         Tobacco/Alcohol/Supplements:     Last Reviewed on 12/15/2020 03:52 PM by Sondra Wade    Tobacco: She has never smoked.          Alcohol: Frequency: Socially     Caffeine:  She admits to consuming caffeine via soda ( 1 servings per week ).          Substance Abuse History:     Last Reviewed on 12/15/2020 03:52 PM by Sondra Wade    NEGATIVE         Mental Health History:     Last Reviewed on 12/15/2020 03:52 PM by Sondra Wade    NEGATIVE         Communicable Diseases (eg STDs):     Last Reviewed on 12/15/2020 03:52 PM by Sondra Wade        Immunizations:     influenza, injectable, quadrivalent, preservative free 10/21/2020    Hep A, adult dose 5/14/2018    Fluzone (3 + years dose) 12/11/2012    Fluzone (3 + years dose) 11/8/2017    Fluzone Quadrivalent (3+ years) 1/18/2019    Fluzone Quadrivalent (3+ years) 10/1/2019        Allergies:     Last Reviewed on 12/15/2020 03:52 PM by Sondra Wade    Lisinopril: diarrhea,vomiting,abdominal pain  (Adverse Reaction)        Current Medications:     Last Reviewed on 12/15/2020 03:52 PM by Sondra Wade    hydroCHLOROthiazide 25 mg oral tablet [ 1 pill daily]    nystatin 100,000 unit/gram Topical Cream [apply to the affected area(s) by topical route 2 times per day]        Objective:        Vitals:         Current: 12/15/2020 3:40:00 PM    Ht:  5 ft, 5 in;  Wt: 186.8 lbs;  BMI: 31.1T: 98 F (temporal);  BP: 146/78 mm Hg (left arm, sitting);  P: 82 bpm (left arm (BP Cuff), sitting);  sCr: 0.68 mg/dL;   GFR: 125.25        Exams:     PHYSICAL EXAM:     GENERAL: vital signs recorded - well developed, well nourished;  well groomed;  no apparent distress;     EYES: extraocular movements intact; conjunctiva and cornea are normal; PERRLA;     NECK: range of motion is normal; thyroid exam reveals not enlarged;     RESPIRATORY: normal respiratory rate and pattern with no distress; normal breath sounds with no rales, rhonchi, wheezes or rubs;     CARDIOVASCULAR: normal rate; rhythm is regular;  no systolic murmur; no edema;     LYMPHATIC: bilateral anterior cervical nodes (.5-1 cm in size, firm, nontender, mobile );     MUSCULOSKELETAL: normal gait; normal overall tone firm non tender nodule to left posterior neck/upper back. approximately 1-2 cm.;     NEUROLOGIC: mental status: alert and oriented x 3;         Assessment:         R59.9   Enlarged lymph nodes, unspecified       R22.1   Localized swelling, mass and lump, neck           ORDERS:         Radiology/Test Orders:       [id number]  US, soft tissues of head & neck (eg, thyroid, parathyroid, parotid), real time w/image documentation  (Send-Out)    There is also a nodule to left posterior neck, almost more so on her upper back. Will this be included?                   Plan:         Enlarged lymph nodes, unspecifiedwill notify pt of results. Reactive lymph node enlargement d/t recent illness a possibility. It has been longer than 6 weeks.  See US order comments if further imaging is going to be required for nodule on lower posterior neck.        RADIOLOGY:  I have ordered Neck Ultrasound to be done today.            Orders:       [id number]  US, soft tissues of head & neck (eg, thyroid, parathyroid, parotid), real time w/image documentation  (Send-Out)    There is also a nodule to left posterior neck, almost more so on her upper back. Will this be included?           Localized swelling, mass and lump, necksee above.             Charge Capture:         Primary Diagnosis:     R59.9   Enlarged lymph nodes, unspecified           Orders:      [id number]  Office/outpatient visit; established patient, level 3  (In-House)              R22.1  Localized swelling, mass and lump, neck         ADDENDUMS:      ____________________________________    Addendum: 12/30/2020 10:58 AM - Glory Juan        per aim     request is under review     will check back on this     kmm

## 2021-05-18 NOTE — PROGRESS NOTES
Ayesha Patel. 1985     Office/Outpatient Visit    Visit Date: Tue, May 14, 2019 03:17 pm    Provider: Shivani Archibald MD (Assistant: Stephanie Boyd MA)    Location: [facility]        Electronically signed by Shivani Archibald MD on  2019 04:45:49 PM                             SUBJECTIVE:        CC:     Ms. Patel is a 33 year old White female.  She presents with body aches, sore throat.          HPI:     Ayesha presents today with sx for the past 3 days.  +Fatigue and malaise, mild subjective fevers, no chills.  No rhinorrhea, congestion.  +Sore throat with white coating.  No cough.  No CP or SOB.  +Headache.  No abd pain, N/V/D.  +Body aches -- these are bothering her the worst.  She had a similar presentation last time she had Strep.  Sick contacts: none known.      ROS:     CONSTITUTIONAL:  Positive for fatigue, fever ( subjective ) and malaise.   Negative for chills.      EYES:  Negative for blurred vision.      E/N/T:  Positive for ear pain ( bilateral ) and sore throat.   Negative for diminished hearing, nasal congestion, frequent rhinorrhea or sinus pressure.      CARDIOVASCULAR:  Negative for chest pain and palpitations.      RESPIRATORY:  Negative for recent cough and dyspnea.      GASTROINTESTINAL:  Negative for abdominal pain, diarrhea, nausea and vomiting.      MUSCULOSKELETAL:  Positive for myalgias.   Negative for arthralgias.          PMH/FMH/SH:     Last Reviewed on 2019 03:31 PM by Shivani Archibald    Past Medical History:                 PAST MEDICAL HISTORY             GYNECOLOGICAL HISTORY:     miscarriage 1    Problems with pregnancy have included gestational diabetes; elevated blood pressure.      Contraception: partner is S/P vasectomy; Hospitalizations: pregnancy         PREVENTIVE HEALTH MAINTENANCE             MAMMOGRAM: Done within last 2 years and results in are chart was last done 08- Neg         Surgical History:     NONE          Family History:     Father: Healthy     Mother:    Positive for Hypertension;     Brother(s): Healthy; 1 brother(s) total     Son(s): Healthy; 1 son(s) total     Maternal Grandfather:    Positive for Cancer (unspecified type);     Maternal Grandmother:    Positive for Hypertension and Myocardial Infarction;         Social History:     Occupation: [employer]     Marital Status:      Children: 2 children         Tobacco/Alcohol/Supplements:     Last Reviewed on 5/14/2019 03:31 PM by Shivani Archibald    Tobacco: She has never smoked.      Caffeine:  She admits to consuming caffeine via soda ( 1 servings per week ).          Substance Abuse History:     Last Reviewed on 5/14/2019 03:31 PM by Shivani Archibald    NEGATIVE         Mental Health History:     Last Reviewed on 5/14/2019 03:31 PM by Shivani Archibald        Communicable Diseases (eg STDs):     Last Reviewed on 5/14/2019 03:31 PM by Shivani Archibald            Current Problems:     Last Reviewed on 11/29/2016 01:25 PM by Jacinda Geiger    Positive pregnancy test     Low back pain     Elevated cholesterol     Screening for cholesterol level     Hypertension     Fatigue     Essential hypertension     Acne     Headache         Immunizations:     Hep A, adult dose 5/14/2018     Fluzone (3 + years dose) 12/11/2012     Fluzone (3 + years dose) 11/8/2017     Fluzone Quadrivalent (3+ years) 1/18/2019         Allergies:     Last Reviewed on 4/01/2019 02:38 PM by Stephanie Boyd    Lisinopril: abdominal pain, diarrhea, vomiting (Adverse Reaction)        Current Medications:     Last Reviewed on 4/01/2019 02:38 PM by Stephanie Boyd    Hydrochlorothiazide (HCTZ) 25mg Tablet 1/2 to 1 pill daily     Amitriptyline HCl 10mg Tablet Take 1 - 2 tablet(s) by mouth at bedtime         OBJECTIVE:        Vitals:         Current: 5/14/2019 3:23:28 PM    Ht:  5 ft, 5 in;  Wt: 192.2 lbs;  BMI: 32.0    T: 99.6 F (oral);  BP: 123/69 mm Hg (left arm, sitting);  P: 99 bpm (left arm  (BP Cuff), sitting);  sCr: 0.64 mg/dL;  GFR: 137.23        Exams:     PHYSICAL EXAM:     GENERAL: vital signs recorded - well developed, well nourished;  well groomed;  no apparent distress;     EYES: extraocular movements intact; conjunctiva and cornea are normal; PERRLA;     E/N/T: EARS:  normal external auditory canals and tympanic membranes;  grossly normal hearing; NOSE:  normal nasal mucosa, septum, turbinates, and sinuses; OROPHARYNX: oral mucosa reveals erythema;  posterior pharynx shows a posterior pharyngeal exudate;     RESPIRATORY: normal respiratory rate and pattern with no distress; normal breath sounds with no rales, rhonchi, wheezes or rubs;     CARDIOVASCULAR: normal rate; rhythm is regular;  no systolic murmur;     GASTROINTESTINAL: nontender; normal bowel sounds;     LYMPHATIC: right anterior cervical node ( tender, mobile );     MUSCULOSKELETAL: normal gait; normal overall tone         Lab/Test Results:             Rapid Strep Screen:  Negative (05/14/2019),     Performed by::  norman (05/14/2019),             ASSESSMENT           462   J00   J02.0  Sore throat              DDx:         ORDERS:         Meds Prescribed:       Nystatin 100,000U/1gm Topical Powder Apply bid x 2 weeks or until rash clears  #60 (Sixty) gm Refills: 0       Amoxicillin 875mg Tablet One PO BID X 10 days.  #20 (Twenty) tablet(s) Refills: 0       Diflucan (Fluconazole) 150mg Tablet Take 1 tablet(s) by mouth once  #1 (One) tablet(s) Refills: 0         Lab Orders:       [id number]  Group A Streptococcus detection by immunoassay with direct optical observation  (In-House)         [id number]  [facility] Throat culture, strep  (Send-Out)                   PLAN:          Sore throat Centor criteria 4/4 so I am going to treat her clinically with amoxicillin for Group A Strep pharyngitis as below.  Diflucan sent because abx always give her a yeast infection.  Symptomatic care recommended with OTC analgesics for pain/fever prn.  Stay well  hydrated.  Humidifier in the bedroom at night.  Hot tea with lemon and honey.  RTC prn worsening or failure to improve of sx.           Prescriptions:       Amoxicillin 875mg Tablet One PO BID X 10 days.  #20 (Twenty) tablet(s) Refills: 0       Diflucan (Fluconazole) 150mg Tablet Take 1 tablet(s) by mouth once  #1 (One) tablet(s) Refills: 0           Orders:       [id number]  Group A Streptococcus detection by immunoassay with direct optical observation  (In-House)         [id number]  [facility] Throat culture, strep  (Send-Out)               Other Prescriptions:       Nystatin 100,000U/1gm Topical Powder Apply bid x 2 weeks or until rash clears  #60 (Sixty) gm Refills: 0         CHARGE CAPTURE           **Please note: ICD descriptions below are intended for billing purposes only and may not represent clinical diagnoses**        Primary Diagnosis:         462 Sore throat            J00    Acute nasopharyngitis [common cold]           J02.0    Streptococcal pharyngitis              Orders:          [id number]   Office/outpatient visit; established patient, level 3  (In-House)             [id number]   Group A Streptococcus detection by immunoassay with direct optical observation  (In-House)

## 2021-05-27 ENCOUNTER — OFFICE VISIT CONVERTED (OUTPATIENT)
Dept: FAMILY MEDICINE CLINIC | Age: 36
End: 2021-05-27
Attending: NURSE PRACTITIONER

## 2021-06-05 NOTE — PROGRESS NOTES
Ayesha Patel  1985     Office/Outpatient Visit    Visit Date: Thu, May 27, 2021 02:20 pm    Provider: Sondra Wade N.P. (Assistant: Anita Muñoz MA)    Location: [facility]        Electronically signed by Sondra Wade N.P. on  2021 03:23:22 PM                             Subjective:        CC: rash    HPI: 36 y/o female presenting to office via telehealth appt c/o pruritic rash to anmol arms, legs and under breasts. Pt states she often has a yeast rash under breast, but believes she had spread the poison ivy while applying antifungal cream. Rash appeared earlier this week, the day after pulling weeds.     ROS:     CONSTITUTIONAL:  Negative for fatigue and fever.      EYES:  Negative for blurred vision.      CARDIOVASCULAR:  Negative for chest pain, palpitations and pedal edema.      RESPIRATORY:  Negative for recent cough and dyspnea.      MUSCULOSKELETAL:  Negative for myalgias.      INTEGUMENTARY/BREAST:  See HPI     NEUROLOGICAL:  Negative for dizziness, paresthesias and weakness.          Past Medical History / Family History / Social History:         Last Reviewed on 2021 02:21 PM by Sondra Wade    Past Medical History:                 PAST MEDICAL HISTORY             GYNECOLOGICAL HISTORY:     miscarriage 1    Problems with pregnancy have included gestational diabetes; elevated blood pressure.      Contraception: partner is S/P vasectomy; Hospitalizations: pregnancy         PREVENTIVE HEALTH MAINTENANCE             MAMMOGRAM: Done within last 2 years and results in are chart was last done 08- Neg         Surgical History:     NONE         Family History:     Father: Healthy     Mother:    Positive for Hypertension;     Brother(s): Healthy; 1 brother(s) total     Son(s): Healthy; 1 son(s) total     Maternal Grandfather:    Positive for Cancer (unspecified type);     Maternal Grandmother:    Positive for Hypertension and Myocardial Infarction;          Social History:     Occupation: [employer]     Marital Status:      Children: 2 children         Tobacco/Alcohol/Supplements:     Last Reviewed on 5/27/2021 02:21 PM by Sondra Wade    Tobacco: She has never smoked.          Alcohol: Frequency: Socially     Caffeine:  She admits to consuming caffeine via soda ( 1 servings per week ).          Substance Abuse History:     Last Reviewed on 5/27/2021 02:21 PM by Sondra Wade    NEGATIVE         Mental Health History:     Last Reviewed on 5/27/2021 02:21 PM by Sondra Wade    NEGATIVE         Communicable Diseases (eg STDs):     Last Reviewed on 5/27/2021 02:21 PM by Sondra Wade        Immunizations:     influenza, injectable, quadrivalent, preservative free 10/21/2020    Hep A, adult dose 5/14/2018    Fluzone (3 + years dose) 12/11/2012    Fluzone (3 + years dose) 11/8/2017    Fluzone Quadrivalent (3+ years) 1/18/2019    Fluzone Quadrivalent (3+ years) 10/1/2019        Allergies:     Last Reviewed on 5/27/2021 02:21 PM by Sondra Wade    Lisinopril: diarrhea,vomiting,abdominal pain  (Adverse Reaction)        Current Medications:     Last Reviewed on 5/27/2021 02:21 PM by Sondra Wade    hydroCHLOROthiazide 25 mg oral tablet [ 1 pill daily]    nystatin 100,000 unit/gram Topical Cream [apply to the affected area(s) by topical route 2 times per day]        Objective:        Exams:     PHYSICAL EXAM:     GENERAL: well developed, well nourished;  well groomed;  no apparent distress;     RESPIRATORY: normal respiratory rate and pattern with no distress;     NEUROLOGIC: mental status: alert and oriented x 3;     PSYCHIATRIC:  appropriate affect and demeanor; normal speech pattern; grossly normal memory;         Assessment:         L24.7   Irritant contact dermatitis due to plants, except food       B37.2   Candidiasis of skin and nail           ORDERS:         Meds Prescribed:       [New Rx] predniSONE 10 mg oral tablet [Take 6  tabs po x 2 days, then 4 tabs po x 3 days, then 3 tabs po x 3 days, then 2 tabs po x 3 days, then 1 tab po x 3 days], #42 (forty two) tablets, Refills: 0 (zero)       [New Rx] hydrocortisone 2.5 % Topical Cream [apply a thin layer to the affected area(s) by topical route 2 times per day], #30 (thirty) grams, Refills: 0 (zero)       [New Rx] nystatin 100,000 unit/gram Topical Cream [apply to the affected area(s) by topical route 2 times per day], #30 (thirty) grams, Refills: 0 (zero)                 Plan:         Irritant contact dermatitis due to plants, except foodTake medications as rx. Notify office with any acute concerns or issues. Potential s/e of meds discussed. Benadryl as needed. Pt v/u and had no further questions upon d/c.           Prescriptions:       [New Rx] predniSONE 10 mg oral tablet [Take 6 tabs po x 2 days, then 4 tabs po x 3 days, then 3 tabs po x 3 days, then 2 tabs po x 3 days, then 1 tab po x 3 days], #42 (forty two) tablets, Refills: 0 (zero)       [New Rx] hydrocortisone 2.5 % Topical Cream [apply a thin layer to the affected area(s) by topical route 2 times per day], #30 (thirty) grams, Refills: 0 (zero)         Candidiasis of skin and nailUse cream as directed for under breasts. Keep area as clean and dry as possible. F/u as needed.     Telehealth: Verbal consent obtained for visit to occur via televideo conferencing; Staff, other than provider, present during telephone visit include VALENTE Muñoz MA           Prescriptions:       [New Rx] nystatin 100,000 unit/gram Topical Cream [apply to the affected area(s) by topical route 2 times per day], #30 (thirty) grams, Refills: 0 (zero)             Charge Capture:         Primary Diagnosis:     L24.7  Irritant contact dermatitis due to plants, except food           Orders:      [id number]  Office/outpatient visit; established patient, level 3  (In-House)              B37.2  Candidiasis of skin and nail

## 2021-06-14 ENCOUNTER — TELEPHONE (OUTPATIENT)
Dept: FAMILY MEDICINE CLINIC | Age: 36
End: 2021-06-14

## 2021-06-14 NOTE — TELEPHONE ENCOUNTER
Caller: Ayesha Patel    Relationship to patient: Self    Best call back number: [phone]    Type of visit: OFFICE VISIT    Requested date: THIS WEEK    If rescheduling, when is the original appointment: 6/21/21    Additional notes: PATIENT WOULD LIKE TO KNOW IF THERE IS ANY WAY SHE CAN BE SEEN THIS WEEK. SHE HAS AN APPT WITH LAKESHIA CLINE ON 6/21/21. PLEASE CALL TO ADVISE.

## 2021-06-21 ENCOUNTER — OFFICE VISIT (OUTPATIENT)
Dept: FAMILY MEDICINE CLINIC | Age: 36
End: 2021-06-21

## 2021-06-21 VITALS
HEIGHT: 65 IN | DIASTOLIC BLOOD PRESSURE: 83 MMHG | WEIGHT: 186.4 LBS | BODY MASS INDEX: 31.06 KG/M2 | TEMPERATURE: 99 F | SYSTOLIC BLOOD PRESSURE: 137 MMHG | HEART RATE: 84 BPM

## 2021-06-21 DIAGNOSIS — I10 BENIGN ESSENTIAL HYPERTENSION: Primary | ICD-10-CM

## 2021-06-21 DIAGNOSIS — M54.2 CERVICALGIA: Chronic | ICD-10-CM

## 2021-06-21 DIAGNOSIS — R59.1 LYMPHADENOPATHY: ICD-10-CM

## 2021-06-21 DIAGNOSIS — M79.10 MYALGIA: ICD-10-CM

## 2021-06-21 DIAGNOSIS — N64.4 BREAST PAIN: ICD-10-CM

## 2021-06-21 DIAGNOSIS — R42 DIZZY: ICD-10-CM

## 2021-06-21 PROCEDURE — 99214 OFFICE O/P EST MOD 30 MIN: CPT | Performed by: NURSE PRACTITIONER

## 2021-06-21 NOTE — PROGRESS NOTES
"Ayesha Patel presents to [facility].    Chief Complaint:  Neck Pain (chest pain, started in Nov 2020)         History of Present Illness:  Posterior naida pain, light headed, myalgia's and bilateral breast pain. She thought she had COVID in October 2020.  Was not tested but has not felt back to normal since then.  She has had fatigue, maylgia's that have been variable in regards to location.  Has some swollen lymph nodes.  Has had labs, dg testing, seen ENT, hem/onc and an internist in [place].  She has seen her GYN and had a breast exam by her and the internist she saw earlier this year.  She did have a parvovirus test and negative covid antibody test. Reviewed hand written notes patient brought with her regarding symptoms over the last 7-8 months.       Review of Systems:  Review of Systems   Respiratory: Negative for cough and shortness of breath.    Cardiovascular: Negative for chest pain, palpitations and leg swelling.   Genitourinary: Negative for breast lump.   Neurological: Negative for weakness and numbness.          Vital Signs:   /83 (BP Location: Right arm, Patient Position: Sitting)   Pulse 84   Temp 99 °F (37.2 °C) (Oral)   Ht 165.1 cm (65\")   Wt 84.6 kg (186 lb 6.4 oz)   BMI 31.02 kg/m²       Physical Exam:  Physical Exam  Vitals reviewed.   Constitutional:       General: She is not in acute distress.     Appearance: Normal appearance.   Eyes:      Extraocular Movements: Extraocular movements intact.      Pupils: Pupils are equal, round, and reactive to light.   Neck:      Vascular: No carotid bruit.   Cardiovascular:      Rate and Rhythm: Normal rate and regular rhythm.      Heart sounds: Normal heart sounds. No murmur heard.     Pulmonary:      Effort: Pulmonary effort is normal. No respiratory distress.      Breath sounds: Normal breath sounds.   Musculoskeletal:      Cervical back: Full passive range of motion without pain.   Lymphadenopathy:      " Cervical: Cervical adenopathy present.      Left cervical: Deep cervical adenopathy (shotty) present.   Neurological:      General: No focal deficit present.      Mental Status: She is alert and oriented to person, place, and time.   Psychiatric:         Mood and Affect: Mood normal.         Behavior: Behavior normal.         Result Review      The following data was reviewed by: LAKESHIA Antonio on 06/21/2021: reviewed C spine done in , DDD, reviewed CT of neck and chest 1-28-21.     Results for orders placed or performed in visit on 02/08/21   Hemoglobin A1c    Specimen: Blood   Result Value Ref Range    Hemoglobin A1C 5.30 4.80 - 5.60 %   Thyroid Peroxidase Antibody    Specimen: Blood   Result Value Ref Range    Thyroid Peroxidase Antibody 9 0 - 34 IU/mL   Sedimentation Rate    Specimen: Blood   Result Value Ref Range    Sed Rate 14 0 - 20 mm/hr   Vitamin D 25 Hydroxy    Specimen: Blood   Result Value Ref Range    25 Hydroxy, Vitamin D 45.3 30.0 - 100.0 ng/ml   Uric Acid    Specimen: Blood   Result Value Ref Range    Uric Acid 4.8 2.4 - 5.7 mg/dL   MONTRELL With / DsDNA, RNP, Sjogrens A / B, Hawk    Specimen: Blood   Result Value Ref Range    MONTRELL Direct Negative Negative   Rheumatoid Factor    Specimen: Blood   Result Value Ref Range    Rheumatoid Factor Quantitative <10.0 0.0 - 14.0 IU/mL   Parvovirus B19 Antibody, IgG & IgM    Specimen: Blood   Result Value Ref Range    Parvovirus B19 IgG 3.5 (H) 0.0 - 0.8 index    Parvovirus B19 IgM 0.1 0.0 - 0.8 index   Lyme, Total Antibody Test / Reflex    Specimen: Blood   Result Value Ref Range    Lyme Ab IgG/IgM <0.91 0.00 - 0.90 ISR   SARS-CoV-2 Antibody, IgM    Specimen: Blood   Result Value Ref Range    SARS-COV-2 ANTIBODY, IGM Negative Negative               Assessment and Plan:          Diagnoses and all orders for this visit:    1. Benign essential hypertension (Primary)  Assessment & Plan:  Continue current therapy for her HTN.       2.  Cervicalgia  Assessment & Plan:  Reviewed CT of neck with pt, discussed DDD.        3. Lymphadenopathy  Assessment & Plan:  To continue to observe her symptoms.  Some of her lymph nodes she has been monitoring have gotten smaller.       4. Breast pain  Assessment & Plan:  Will schedule dg mammogram and ultrasound if needed.     Orders:  -     Mammo Diagnostic Bilateral With CAD; Future    5. Dizzy  Assessment & Plan:  After reviewing her labs, CT, will check a CT of her head without contrast.     Orders:  -     CT Head Without Contrast; Future    6. Myalgia  Assessment & Plan:  Consider fibromyalgia.           Follow Up   Return for follow up pending x-ray result.  Patient was given instructions and counseling regarding her condition or for health maintenance advice. Please see specific information pulled into the AVS if appropriate.

## 2021-06-22 NOTE — ASSESSMENT & PLAN NOTE
To continue to observe her symptoms.  Some of her lymph nodes she has been monitoring have gotten smaller.

## 2021-06-25 DIAGNOSIS — N64.4 BREAST PAIN: Primary | ICD-10-CM

## 2021-06-30 ENCOUNTER — HOSPITAL ENCOUNTER (OUTPATIENT)
Dept: CT IMAGING | Facility: HOSPITAL | Age: 36
Discharge: HOME | End: 2021-06-30

## 2021-06-30 ENCOUNTER — ANCILLARY ORDERS (OUTPATIENT)
Dept: FAMILY MEDICINE CLINIC | Age: 36
End: 2021-06-30

## 2021-06-30 ENCOUNTER — HOSPITAL ENCOUNTER (OUTPATIENT)
Dept: MAMMOGRAPHY | Facility: HOSPITAL | Age: 36
Discharge: HOME | End: 2021-06-30

## 2021-06-30 DIAGNOSIS — N64.4 BREAST PAIN: ICD-10-CM

## 2021-06-30 DIAGNOSIS — R42 DIZZY: ICD-10-CM

## 2021-06-30 PROCEDURE — 77066 DX MAMMO INCL CAD BI: CPT

## 2021-06-30 PROCEDURE — 70450 CT HEAD/BRAIN W/O DYE: CPT

## 2021-06-30 PROCEDURE — G0279 TOMOSYNTHESIS, MAMMO: HCPCS

## 2021-07-01 VITALS
HEART RATE: 70 BPM | TEMPERATURE: 98.7 F | WEIGHT: 196.4 LBS | HEIGHT: 65 IN | SYSTOLIC BLOOD PRESSURE: 126 MMHG | BODY MASS INDEX: 32.72 KG/M2 | DIASTOLIC BLOOD PRESSURE: 72 MMHG

## 2021-07-01 VITALS
HEART RATE: 85 BPM | WEIGHT: 192.8 LBS | TEMPERATURE: 98.3 F | DIASTOLIC BLOOD PRESSURE: 77 MMHG | BODY MASS INDEX: 32.12 KG/M2 | HEIGHT: 65 IN | SYSTOLIC BLOOD PRESSURE: 126 MMHG

## 2021-07-01 VITALS
BODY MASS INDEX: 32.02 KG/M2 | HEIGHT: 65 IN | SYSTOLIC BLOOD PRESSURE: 123 MMHG | TEMPERATURE: 99.6 F | WEIGHT: 192.2 LBS | HEART RATE: 99 BPM | DIASTOLIC BLOOD PRESSURE: 69 MMHG

## 2021-07-01 VITALS
WEIGHT: 200.2 LBS | SYSTOLIC BLOOD PRESSURE: 132 MMHG | HEART RATE: 75 BPM | DIASTOLIC BLOOD PRESSURE: 81 MMHG | TEMPERATURE: 98.6 F | HEIGHT: 65 IN | BODY MASS INDEX: 33.36 KG/M2

## 2021-07-01 VITALS
BODY MASS INDEX: 31.72 KG/M2 | TEMPERATURE: 98.7 F | HEIGHT: 65 IN | WEIGHT: 190.4 LBS | DIASTOLIC BLOOD PRESSURE: 78 MMHG | HEART RATE: 80 BPM | SYSTOLIC BLOOD PRESSURE: 127 MMHG

## 2021-07-01 VITALS
BODY MASS INDEX: 32.65 KG/M2 | HEIGHT: 65 IN | DIASTOLIC BLOOD PRESSURE: 76 MMHG | HEART RATE: 74 BPM | SYSTOLIC BLOOD PRESSURE: 134 MMHG | TEMPERATURE: 98.4 F | WEIGHT: 196 LBS

## 2021-07-01 VITALS
SYSTOLIC BLOOD PRESSURE: 137 MMHG | DIASTOLIC BLOOD PRESSURE: 89 MMHG | BODY MASS INDEX: 31.96 KG/M2 | WEIGHT: 191.8 LBS | TEMPERATURE: 97.4 F | HEART RATE: 62 BPM | HEIGHT: 65 IN

## 2021-07-01 VITALS
DIASTOLIC BLOOD PRESSURE: 81 MMHG | HEIGHT: 65 IN | HEART RATE: 74 BPM | BODY MASS INDEX: 33.59 KG/M2 | SYSTOLIC BLOOD PRESSURE: 129 MMHG | WEIGHT: 201.6 LBS | TEMPERATURE: 98.4 F

## 2021-07-01 VITALS
SYSTOLIC BLOOD PRESSURE: 143 MMHG | HEART RATE: 83 BPM | HEIGHT: 65 IN | BODY MASS INDEX: 33.42 KG/M2 | TEMPERATURE: 98.3 F | WEIGHT: 200.6 LBS | DIASTOLIC BLOOD PRESSURE: 67 MMHG

## 2021-07-02 VITALS
DIASTOLIC BLOOD PRESSURE: 82 MMHG | BODY MASS INDEX: 30.75 KG/M2 | HEART RATE: 82 BPM | HEIGHT: 65 IN | WEIGHT: 184.6 LBS | TEMPERATURE: 97.9 F | SYSTOLIC BLOOD PRESSURE: 149 MMHG

## 2021-07-02 VITALS
TEMPERATURE: 98.6 F | DIASTOLIC BLOOD PRESSURE: 78 MMHG | HEIGHT: 65 IN | SYSTOLIC BLOOD PRESSURE: 122 MMHG | BODY MASS INDEX: 31.96 KG/M2 | HEART RATE: 71 BPM | WEIGHT: 191.8 LBS

## 2021-07-02 VITALS
TEMPERATURE: 97.8 F | HEART RATE: 70 BPM | HEIGHT: 65 IN | WEIGHT: 183.8 LBS | SYSTOLIC BLOOD PRESSURE: 121 MMHG | DIASTOLIC BLOOD PRESSURE: 76 MMHG | BODY MASS INDEX: 30.62 KG/M2

## 2021-07-02 VITALS
TEMPERATURE: 98.4 F | BODY MASS INDEX: 30.26 KG/M2 | WEIGHT: 181.6 LBS | HEART RATE: 84 BPM | HEIGHT: 65 IN | DIASTOLIC BLOOD PRESSURE: 79 MMHG | SYSTOLIC BLOOD PRESSURE: 136 MMHG

## 2021-07-02 VITALS
DIASTOLIC BLOOD PRESSURE: 88 MMHG | HEIGHT: 65 IN | TEMPERATURE: 98.3 F | SYSTOLIC BLOOD PRESSURE: 148 MMHG | HEART RATE: 76 BPM | BODY MASS INDEX: 30.29 KG/M2 | WEIGHT: 181.8 LBS

## 2021-07-02 VITALS
HEIGHT: 65 IN | HEART RATE: 82 BPM | BODY MASS INDEX: 31.12 KG/M2 | WEIGHT: 186.8 LBS | SYSTOLIC BLOOD PRESSURE: 146 MMHG | TEMPERATURE: 98 F | DIASTOLIC BLOOD PRESSURE: 78 MMHG

## 2021-08-30 RX ORDER — HYDROCHLOROTHIAZIDE 25 MG/1
25 TABLET ORAL DAILY
Qty: 90 TABLET | Refills: 1 | Status: SHIPPED | OUTPATIENT
Start: 2021-08-30 | End: 2022-04-19 | Stop reason: SDUPTHER

## 2021-08-31 RX ORDER — HYDROCHLOROTHIAZIDE 25 MG/1
25 TABLET ORAL DAILY
Qty: 90 TABLET | Refills: 1 | OUTPATIENT
Start: 2021-08-31

## 2021-08-31 NOTE — TELEPHONE ENCOUNTER
LAST VISIT: 6/21/21  NEXT VISIT: NONE  LAST LAB:    MEDICATION WAS SENT 8/30/ 21 FOR REFILL   Lab Results   Component Value Date    BUN 10 01/04/2021    CREATININE 0.67 01/04/2021    BCR 15 01/04/2021    K 3.8 01/04/2021    CO2 27 01/04/2021    CALCIUM 9.7 01/04/2021    ALBUMIN 4.8 01/04/2021    LABIL2 1.7 01/04/2021    AST 12 (L) 01/04/2021    ALT 11 01/04/2021     WBC   Date Value Ref Range Status   01/04/2021 9.60 4.80 - 10.80 10*3/uL Final     RBC   Date Value Ref Range Status   01/04/2021 4.61 4.20 - 5.40 10*6/uL Final     Hemoglobin   Date Value Ref Range Status   01/04/2021 13.4 12.0 - 16.0 g/dL Final     Hematocrit   Date Value Ref Range Status   01/04/2021 39.7 37.0 - 47.0 % Final     MCV   Date Value Ref Range Status   01/04/2021 86.1 81.0 - 99.0 fL Final     MCH   Date Value Ref Range Status   01/04/2021 29.1 27.0 - 31.0 pg Final     MCHC   Date Value Ref Range Status   01/04/2021 33.8 33.0 - 37.0 Final     RDW   Date Value Ref Range Status   01/04/2021 12.3 11.7 - 14.4 % Final     RDW-SD   Date Value Ref Range Status   01/04/2021 38.7 36.4 - 46.3 fL Final     MPV   Date Value Ref Range Status   01/04/2021 10.8 9.4 - 12.3 fL Final     Platelets   Date Value Ref Range Status   01/04/2021 253 130 - 400 10*3/uL Final     Neutrophil Rel %   Date Value Ref Range Status   01/04/2021 66.3 30.0 - 85.0 % Final     Lymphocyte Rel %   Date Value Ref Range Status   01/04/2021 25.3 20.0 - 45.0 % Final     Monocyte Rel %   Date Value Ref Range Status   01/04/2021 6.9 3.0 - 10.0 % Final     Eosinophil Rel %   Date Value Ref Range Status   01/04/2021 0.8 0.0 - 7.0 % Final     Basophil Rel %   Date Value Ref Range Status   01/04/2021 0.4 0.0 - 3.0 % Final     Neutrophils Absolute   Date Value Ref Range Status   01/04/2021 6.36 2.00 - 8.00 10*3/uL Final     Lymphocytes Absolute   Date Value Ref Range Status   01/04/2021 2.43 1.00 - 5.00 10*3/uL Final     Monocytes Absolute   Date Value Ref Range Status   01/04/2021 0.66  0.20 - 1.20 10*3/uL Final     Eosinophils Absolute   Date Value Ref Range Status   01/04/2021 0.08 0.00 - 0.70 10*3/uL Final     Basophils Absolute   Date Value Ref Range Status   01/04/2021 0.04 0.00 - 0.20 10*3/uL Final     NRBC   Date Value Ref Range Status   01/04/2021 0.00 0.0 - 0.7 % Final

## 2021-11-02 ENCOUNTER — OFFICE VISIT (OUTPATIENT)
Dept: FAMILY MEDICINE CLINIC | Age: 36
End: 2021-11-02

## 2021-11-02 ENCOUNTER — LAB (OUTPATIENT)
Dept: LAB | Facility: HOSPITAL | Age: 36
End: 2021-11-02

## 2021-11-02 VITALS
HEIGHT: 65 IN | HEART RATE: 86 BPM | SYSTOLIC BLOOD PRESSURE: 135 MMHG | DIASTOLIC BLOOD PRESSURE: 76 MMHG | BODY MASS INDEX: 31.89 KG/M2 | WEIGHT: 191.4 LBS | TEMPERATURE: 98.7 F

## 2021-11-02 DIAGNOSIS — I10 BENIGN ESSENTIAL HYPERTENSION: ICD-10-CM

## 2021-11-02 DIAGNOSIS — R59.1 LYMPHADENOPATHY: ICD-10-CM

## 2021-11-02 DIAGNOSIS — R53.81 MALAISE: ICD-10-CM

## 2021-11-02 DIAGNOSIS — Z23 ENCOUNTER FOR IMMUNIZATION: Primary | ICD-10-CM

## 2021-11-02 PROBLEM — E66.3 OVERWEIGHT WITH BODY MASS INDEX (BMI) OF 29 TO 29.9 IN ADULT: Status: RESOLVED | Noted: 2021-02-11 | Resolved: 2021-11-02

## 2021-11-02 PROBLEM — R42 DIZZY: Status: RESOLVED | Noted: 2021-06-21 | Resolved: 2021-11-02

## 2021-11-02 PROBLEM — O24.410 DIET CONTROLLED GESTATIONAL DIABETES MELLITUS (GDM), ANTEPARTUM: Status: RESOLVED | Noted: 2021-02-11 | Resolved: 2021-11-02

## 2021-11-02 LAB
ALBUMIN SERPL-MCNC: 4.8 G/DL (ref 3.5–5.2)
ALBUMIN/GLOB SERPL: 1.6 G/DL
ALP SERPL-CCNC: 60 U/L (ref 39–117)
ALT SERPL W P-5'-P-CCNC: 15 U/L (ref 1–33)
ANION GAP SERPL CALCULATED.3IONS-SCNC: 6.7 MMOL/L (ref 5–15)
AST SERPL-CCNC: 13 U/L (ref 1–32)
BASOPHILS # BLD AUTO: 0.05 10*3/MM3 (ref 0–0.2)
BASOPHILS NFR BLD AUTO: 0.5 % (ref 0–1.5)
BILIRUB SERPL-MCNC: 0.3 MG/DL (ref 0–1.2)
BUN SERPL-MCNC: 13 MG/DL (ref 6–20)
BUN/CREAT SERPL: 17.8 (ref 7–25)
CALCIUM SPEC-SCNC: 9.5 MG/DL (ref 8.6–10.5)
CHLORIDE SERPL-SCNC: 102 MMOL/L (ref 98–107)
CHOLEST SERPL-MCNC: 162 MG/DL (ref 0–200)
CO2 SERPL-SCNC: 29.3 MMOL/L (ref 22–29)
CREAT SERPL-MCNC: 0.73 MG/DL (ref 0.57–1)
DEPRECATED RDW RBC AUTO: 38.9 FL (ref 37–54)
EOSINOPHIL # BLD AUTO: 0.12 10*3/MM3 (ref 0–0.4)
EOSINOPHIL NFR BLD AUTO: 1.2 % (ref 0.3–6.2)
ERYTHROCYTE [DISTWIDTH] IN BLOOD BY AUTOMATED COUNT: 12.6 % (ref 12.3–15.4)
GFR SERPL CREATININE-BSD FRML MDRD: 90 ML/MIN/1.73
GLOBULIN UR ELPH-MCNC: 3 GM/DL
GLUCOSE SERPL-MCNC: 121 MG/DL (ref 65–99)
HCT VFR BLD AUTO: 40.1 % (ref 34–46.6)
HDLC SERPL-MCNC: 33 MG/DL (ref 40–60)
HGB BLD-MCNC: 13.8 G/DL (ref 12–15.9)
IMM GRANULOCYTES # BLD AUTO: 0.01 10*3/MM3 (ref 0–0.05)
IMM GRANULOCYTES NFR BLD AUTO: 0.1 % (ref 0–0.5)
LDLC SERPL CALC-MCNC: 100 MG/DL (ref 0–100)
LDLC/HDLC SERPL: 2.93 {RATIO}
LYMPHOCYTES # BLD AUTO: 2.82 10*3/MM3 (ref 0.7–3.1)
LYMPHOCYTES NFR BLD AUTO: 27.6 % (ref 19.6–45.3)
MCH RBC QN AUTO: 28.9 PG (ref 26.6–33)
MCHC RBC AUTO-ENTMCNC: 34.4 G/DL (ref 31.5–35.7)
MCV RBC AUTO: 84.1 FL (ref 79–97)
MONOCYTES # BLD AUTO: 0.64 10*3/MM3 (ref 0.1–0.9)
MONOCYTES NFR BLD AUTO: 6.3 % (ref 5–12)
NEUTROPHILS NFR BLD AUTO: 6.57 10*3/MM3 (ref 1.7–7)
NEUTROPHILS NFR BLD AUTO: 64.3 % (ref 42.7–76)
PLATELET # BLD AUTO: 254 10*3/MM3 (ref 140–450)
PMV BLD AUTO: 10 FL (ref 6–12)
POTASSIUM SERPL-SCNC: 4.2 MMOL/L (ref 3.5–5.2)
PROT SERPL-MCNC: 7.8 G/DL (ref 6–8.5)
RBC # BLD AUTO: 4.77 10*6/MM3 (ref 3.77–5.28)
SODIUM SERPL-SCNC: 138 MMOL/L (ref 136–145)
TRIGL SERPL-MCNC: 162 MG/DL (ref 0–150)
TSH SERPL DL<=0.05 MIU/L-ACNC: 1.61 UIU/ML (ref 0.27–4.2)
VLDLC SERPL-MCNC: 29 MG/DL (ref 5–40)
WBC # BLD AUTO: 10.21 10*3/MM3 (ref 3.4–10.8)

## 2021-11-02 PROCEDURE — 84443 ASSAY THYROID STIM HORMONE: CPT

## 2021-11-02 PROCEDURE — 99214 OFFICE O/P EST MOD 30 MIN: CPT | Performed by: NURSE PRACTITIONER

## 2021-11-02 PROCEDURE — 85025 COMPLETE CBC W/AUTO DIFF WBC: CPT

## 2021-11-02 PROCEDURE — 80053 COMPREHEN METABOLIC PANEL: CPT

## 2021-11-02 PROCEDURE — 80061 LIPID PANEL: CPT

## 2021-11-02 PROCEDURE — 90471 IMMUNIZATION ADMIN: CPT | Performed by: NURSE PRACTITIONER

## 2021-11-02 PROCEDURE — 36415 COLL VENOUS BLD VENIPUNCTURE: CPT

## 2021-11-02 PROCEDURE — 90686 IIV4 VACC NO PRSV 0.5 ML IM: CPT | Performed by: NURSE PRACTITIONER

## 2021-11-02 NOTE — ASSESSMENT & PLAN NOTE
Will order repeat CTs of head and neck for continued lymphadenopathy and newer lymph nodes above  Clavicles.  There were prominent precarinal lymph nodes note from 1/28/21 CT.

## 2021-11-02 NOTE — PROGRESS NOTES
"Chief Complaint  Swollen lymph nodes for over a year (follow up)    Subjective          Ayesha Patel presents to [facility] for swollen lymph nodes.  Patient states that overall she is feeling much better, however she still has moments of pain in her bilateral upper arms.  She has also noticed 2 new lymph nodes just above clavicle, left larger than right.  The one to her right upper shoulder/neck area is also still present.  She is requesting for routine labs to be ordered as well.  Patient has been to an oncologist, internal medicine, ENT, Dr. Coulter,  LAKESHIA Palmer and myself for several visits for various and similar complaints.  There has been no explanation for symptoms as of yet.  Patient denies any anxiety/depression.  Denies seasonal disorder.          Objective   Vital Signs:   /76 (BP Location: Right arm, Patient Position: Sitting)   Pulse 86   Temp 98.7 °F (37.1 °C) (Oral)   Ht 165.1 cm (65\")   Wt 86.8 kg (191 lb 6.4 oz)   BMI 31.85 kg/m²     Physical Exam  Vitals reviewed.   Constitutional:       Appearance: Normal appearance. She is well-developed.   HENT:      Head: Normocephalic and atraumatic.   Eyes:      Conjunctiva/sclera: Conjunctivae normal.      Pupils: Pupils are equal, round, and reactive to light.   Cardiovascular:      Rate and Rhythm: Normal rate and regular rhythm.      Heart sounds: No murmur heard.      Pulmonary:      Effort: Pulmonary effort is normal. No respiratory distress.      Breath sounds: Normal breath sounds. No wheezing, rhonchi or rales.   Musculoskeletal:      Cervical back: Full passive range of motion without pain.   Skin:     General: Skin is warm and dry.   Neurological:      Mental Status: She is alert and oriented to person, place, and time.   Psychiatric:         Mood and Affect: Mood and affect normal.         Behavior: Behavior normal.         Thought Content: Thought content normal.         Judgment: " Judgment normal.          Result Review :              Assessment and Plan    Diagnoses and all orders for this visit:    1. Encounter for immunization (Primary)  -     FluLaval/Fluarix/Fluzone >6 Months ([id number])    2. Benign essential hypertension  Assessment & Plan:  Stable. Continue HCTZ daily.     Orders:  -     TSH Rfx On Abnormal To Free T4; Future  -     Lipid Panel; Future  -     Comprehensive Metabolic Panel; Future    3. Lymphadenopathy  Assessment & Plan:  Will order repeat CTs of head and neck for continued lymphadenopathy and newer lymph nodes above  Clavicles.  There were prominent precarinal lymph nodes note from 1/28/21 CT.     Orders:  -     CBC Auto Differential; Future    4. Malaise  Comments:  Will send to rheumatology.   Assessment & Plan:  Sending to rheumatology    Orders:  -     Ambulatory Referral to Rheumatology  Patient to notify office with any acute concerns or issues.  Patient verbalizes understanding, agrees with plan of care and has no further questions upon discharge.    Please note that portions of this note were completed with a voice recognition program.    Follow Up    Return in about 6 months (around 5/2/2022) for Annual physical.  Patient was given instructions and counseling regarding her condition or for health maintenance advice. Please see specific information pulled into the AVS if appropriate.

## 2021-11-08 ENCOUNTER — TELEPHONE (OUTPATIENT)
Dept: FAMILY MEDICINE CLINIC | Age: 36
End: 2021-11-08

## 2021-11-08 DIAGNOSIS — R51.9 NONINTRACTABLE HEADACHE, UNSPECIFIED CHRONICITY PATTERN, UNSPECIFIED HEADACHE TYPE: ICD-10-CM

## 2021-11-08 DIAGNOSIS — R59.1 LYMPHADENOPATHY: Primary | ICD-10-CM

## 2021-11-08 NOTE — TELEPHONE ENCOUNTER
Caller: Ayesha Patel    Relationship: Self    Best call back number: [phone]    Who are you requesting to speak with (clinical staff, provider,  specific staff member): MEDICAL STAFF    What was the call regarding: PATIENT WOULD LIKE TO KNOW THE STATUS OF THE ORDER FOR HER CT OF HER NECK AND CHEST. SHE WOULD LIKE TO KNOW IF IT HAS BEEN DENIED BY INSURANCE AGAIN. PLEASE CALL TO ADVISE.

## 2021-11-12 ENCOUNTER — TELEPHONE (OUTPATIENT)
Dept: FAMILY MEDICINE CLINIC | Age: 36
End: 2021-11-12

## 2021-11-12 NOTE — TELEPHONE ENCOUNTER
Caller: Ayesha Patel    Relationship: Self    Best call back number: [phone]    What is the best time to reach you: ANY    Who are you requesting to speak with (clinical staff, provider,  specific staff member): CLINICAL STAFF    What was the call regarding: PATIENT CALLED WANTING TO CHECK ON HER RHEUMATOLOGIST REFERRAL.    SHE WOULD LIKE TO SEE DENI CORDERO IN [place] IF THE REFERRAL HAS NOT BEEN SENT    Do you require a callback: YES

## 2021-11-15 ENCOUNTER — HOSPITAL ENCOUNTER (OUTPATIENT)
Dept: CT IMAGING | Facility: HOSPITAL | Age: 36
Discharge: HOME | End: 2021-11-15

## 2021-11-15 DIAGNOSIS — R59.1 LYMPHADENOPATHY: ICD-10-CM

## 2021-11-15 DIAGNOSIS — R51.9 NONINTRACTABLE HEADACHE, UNSPECIFIED CHRONICITY PATTERN, UNSPECIFIED HEADACHE TYPE: ICD-10-CM

## 2021-11-15 PROCEDURE — 0 IOPAMIDOL PER 1 ML: Performed by: NURSE PRACTITIONER

## 2021-11-15 PROCEDURE — 70491 CT SOFT TISSUE NECK W/DYE: CPT

## 2021-11-15 PROCEDURE — 70470 CT HEAD/BRAIN W/O & W/DYE: CPT

## 2021-11-15 RX ADMIN — IOPAMIDOL 100 ML: 755 INJECTION, SOLUTION INTRAVENOUS at 11:57

## 2021-11-18 DIAGNOSIS — M25.50 ARTHRALGIA OF MULTIPLE SITES: Primary | ICD-10-CM

## 2021-11-18 DIAGNOSIS — R22.2 NODULE OF SKIN OF BACK: Primary | ICD-10-CM

## 2021-11-18 DIAGNOSIS — M79.10 MYALGIA: ICD-10-CM

## 2021-11-18 RX ORDER — IBUPROFEN 800 MG/1
800 TABLET ORAL EVERY 8 HOURS PRN
Qty: 60 TABLET | Refills: 0 | Status: SHIPPED | OUTPATIENT
Start: 2021-11-18 | End: 2022-06-17 | Stop reason: SDUPTHER

## 2021-11-24 ENCOUNTER — TELEPHONE (OUTPATIENT)
Dept: FAMILY MEDICINE CLINIC | Age: 36
End: 2021-11-24

## 2021-11-24 DIAGNOSIS — R22.2 NODULE OF SKIN OF BACK: Primary | ICD-10-CM

## 2021-11-24 NOTE — TELEPHONE ENCOUNTER
Caller: Ayesha Patel    Relationship: Self    Best call back number: [phone]    What is the best time to reach you: ANY     Who are you requesting to speak with CLINICAL     What was the call regarding: PATIENT IS WANTING TO KNOW IF SHE STILL NEEDS TO GET AN ULTRA SOUND  DONE FOR HER BACK FOR THE KNOT AND IF NEEDED WHY BECAUSE IT WAS SHOWN ON THE CT SCAN.     PLEASE ADVISE     Do you require a callback: YES

## 2021-11-30 NOTE — TELEPHONE ENCOUNTER
Pt wants to know if she should still have the U/S or are you just going to send her to general surg?

## 2021-12-13 ENCOUNTER — TELEPHONE (OUTPATIENT)
Dept: SURGERY | Facility: CLINIC | Age: 36
End: 2021-12-13

## 2021-12-13 NOTE — TELEPHONE ENCOUNTER
PT CX HER APPT FOR 12/13, CALLED PT AND SHE STATED THAT SHE WAS GOING TO ANOTHER DR ON 12/20 TO GET HIS OPINION IF SHE NEEDS THIS APPT, WCB TO RS IF NEEDED

## 2022-04-12 ENCOUNTER — OFFICE VISIT (OUTPATIENT)
Dept: FAMILY MEDICINE CLINIC | Age: 37
End: 2022-04-12

## 2022-04-12 VITALS
HEART RATE: 72 BPM | BODY MASS INDEX: 31.49 KG/M2 | DIASTOLIC BLOOD PRESSURE: 70 MMHG | HEIGHT: 65 IN | WEIGHT: 189 LBS | SYSTOLIC BLOOD PRESSURE: 135 MMHG

## 2022-04-12 DIAGNOSIS — M79.7 FIBROMYALGIA: Primary | ICD-10-CM

## 2022-04-12 PROBLEM — U09.9 LONG COVID: Status: ACTIVE | Noted: 2021-02-11

## 2022-04-12 PROCEDURE — 99215 OFFICE O/P EST HI 40 MIN: CPT | Performed by: FAMILY MEDICINE

## 2022-04-12 RX ORDER — DULOXETIN HYDROCHLORIDE 20 MG/1
20 CAPSULE, DELAYED RELEASE ORAL DAILY
Qty: 30 CAPSULE | Refills: 5 | Status: SHIPPED | OUTPATIENT
Start: 2022-04-12 | End: 2023-01-30

## 2022-04-12 NOTE — PROGRESS NOTES
"Chief Complaint  Generalized Body Aches (Body aches, swollen lymph nodes X 1.5 years (started after COVID infection))     75 minutes were spent in this encounter, including the face-to-face encounter, chart review, documentation, and coordination of care.    Subjective          Ayesha Patel presents to [facility] today for evaluation of chronic, ongoing health issues.    She had presumed COVID 10/2020 ( tested positive, then she lost taste and smell and had mild OCVID sx).  She recovered, but in the middle of November, she started feeling \"bad.\"  She describes feeling achy, fatigued with throbbing and aching of her neck.  She came in to see Sondra Kohler at that time.  Labs were done and were unremarkable.  She had some palpable cervical LNs, so U/S was done and that showed some reactive LNs only.  A nodule on the thyroid was evaluated by Dr. Rossi and felt to be benign.  She then started having deep achying muscle pains down the RUE and across the R clavicle.  (She can still feel the LNs at present.)    She then saw Dr. Coulter who attempted to get a CT of the neck but this was denied by insurance.  She then went to Dr. [facility] who ordered CT neck/chest and this showed just a few very small LNs in the mediastinum.  Extensive bloodwork done by Dr. Menjivar was normal.  Dr. Menjivar told her \"there's nothing  I can do for you.\"      She went to her GYN for her yearly exam and that was all normal.  Her GYN did mention the possibility of fibromyalgia to her at that time.    Starting in Feb 2021, she began to note diffuse, usually symmetric pains in the arms, legs, stomach.  Characterized as deep, throbbing pain.  She just felt \"ill, icky.\"  Heating pad and hot bath both relieved her sx but these then constantly returned.    In March, she had intermittent weeks of feeling bad.  She started having the deep throbbing breast pain in the bilateral breasts as well. " " Gail Joseph sent her for a mammo which came back clear.  She also had a head CT (for \"deep,\" ongoing but intermittent occipital pain) that was negative as well.    Over the summer of 2021, \"everything just stopped.\"  She felt really pretty good over the summer period (June through September).  However, in Nov 2021 (\"a year to the date\") \"everything came back again.\"  She came back to see Sondra who repeated the CT neck/chest which again showed no abnormal LNs or cysts.  In Nov 2021, pains primarily in the arms and abdomen started to recur.    She then saw Rheumatology up in [facility] in Dec 2021.  They did labs but that was all clear.  She didn't get any answers there.    She then went to Dr. Ocampo with [facility] just last month who did a CT neck/chest/abdomen and that was all normal except for a few calcified granulomas.  LDH was elevated but other labwork was normal.  He gave her a steroid pack to try and scheduled her to come back in 6 months.    Today, she can feel knots in her stomach and that's where she can feel her pain today.        Current Outpatient Medications:   •  Calcium Carbonate-Vitamin D (calcium-vitamin D) 500-200 MG-UNIT tablet per tablet, Take 1 tablet by mouth Daily., Disp: , Rfl:   •  Cyanocobalamin 1000 MCG capsule, Take 1,000 mcg by mouth Daily., Disp: , Rfl:   •  hydroCHLOROthiazide (HYDRODIURIL) 25 MG tablet, Take 1 tablet by mouth Daily., Disp: 90 tablet, Rfl: 1  •  ibuprofen (ADVIL,MOTRIN) 800 MG tablet, Take 1 tablet by mouth Every 8 (Eight) Hours As Needed for Moderate Pain ., Disp: 60 tablet, Rfl: 0  •  nystatin (MYCOSTATIN) 963539 UNIT/GM cream, Apply 1 application topically to the appropriate area as directed Take As Directed., Disp: , Rfl:   •  DULoxetine (CYMBALTA) 20 MG capsule, Take 1 capsule by mouth Daily., Disp: 30 capsule, Rfl: 5    Allergies:  Lisinopril      Objective   Vital Signs:   Vitals:    04/12/22 1138   BP: 135/70   BP Location: Left arm   Patient Position: " "Sitting   Pulse: 72   Weight: 85.7 kg (189 lb)   Height: 165.1 cm (65\")       Physical Exam  Vitals reviewed.   Constitutional:       General: She is not in acute distress.     Appearance: Normal appearance. She is well-developed.   HENT:      Head: Normocephalic and atraumatic.      Right Ear: External ear normal.      Left Ear: External ear normal.   Eyes:      Extraocular Movements: Extraocular movements intact.      Conjunctiva/sclera: Conjunctivae normal.      Pupils: Pupils are equal, round, and reactive to light.   Cardiovascular:      Rate and Rhythm: Normal rate and regular rhythm.      Heart sounds: No murmur heard.  Pulmonary:      Effort: Pulmonary effort is normal.      Breath sounds: Normal breath sounds. No wheezing, rhonchi or rales.   Abdominal:      General: Bowel sounds are normal. There is no distension.      Palpations: Abdomen is soft.      Tenderness: There is no abdominal tenderness.   Musculoskeletal:         General: Normal range of motion.   Neurological:      Mental Status: She is alert.   Psychiatric:         Mood and Affect: Affect normal.             Assessment and Plan    Diagnoses and all orders for this visit:    1. Fibromyalgia (Primary)  Assessment & Plan:  Today, we first reviewed Ayesha's extensive history of symptoms as well as work-up to get an accurate picture of everything she has been dealing with.  It sounds like it has been a very hard road for her and I am sure has been very frustrating.  She has seen a multitude of specialists and has had a very thorough work-up done.  One of her concerns is lymphoma, which one of her friends was diagnosed with in the recent past.  We discussed today that the likelihood of lymphoma is exceedingly small given the work-up she has received from not 1 but 2 oncologists at this point, and she agrees that this is weighing less on her mind presently.  Fortunately, her extensive lab testing and imaging has all looked very good.  These studies " were all reviewed today by me.    I think it is most likely at this point that she is dealing with fibromyalgia, possibly triggered by her Covid infection in October 2020, since that is when her problems seem to have begun.  We discussed a variety of options for treatment of fibromyalgia today, including Cymbalta, gabapentin, Lyrica and TCAs.  Of all these, I would recommend we start with Cymbalta as it has the lowest potential for problematic side effects.  She is willing to give this a try after discussion of the risks, benefits and what we know about the action of Cymbalta on fibromyalgia.  I would like to see her back in 4 weeks to follow-up and she can call or return to clinic sooner if she has problems.  I have sent that prescription in for her today.    Orders:  -     DULoxetine (CYMBALTA) 20 MG capsule; Take 1 capsule by mouth Daily.  Dispense: 30 capsule; Refill: 5      Follow Up   Return in about 4 weeks (around 5/10/2022).  Patient was given instructions and counseling regarding her condition or for health maintenance advice. Please see specific information pulled into the AVS if appropriate.

## 2022-04-12 NOTE — ASSESSMENT & PLAN NOTE
Today, we first reviewed Ayesha's extensive history of symptoms as well as work-up to get an accurate picture of everything she has been dealing with.  It sounds like it has been a very hard road for her and I am sure has been very frustrating.  She has seen a multitude of specialists and has had a very thorough work-up done.  One of her concerns is lymphoma, which one of her friends was diagnosed with in the recent past.  We discussed today that the likelihood of lymphoma is exceedingly small given the work-up she has received from not 1 but 2 oncologists at this point, and she agrees that this is weighing less on her mind presently.  Fortunately, her extensive lab testing and imaging has all looked very good.  These studies were all reviewed today by me.    I think it is most likely at this point that she is dealing with fibromyalgia, possibly triggered by her Covid infection in October 2020, since that is when her problems seem to have begun.  We discussed a variety of options for treatment of fibromyalgia today, including Cymbalta, gabapentin, Lyrica and TCAs.  Of all these, I would recommend we start with Cymbalta as it has the lowest potential for problematic side effects.  She is willing to give this a try after discussion of the risks, benefits and what we know about the action of Cymbalta on fibromyalgia.  I would like to see her back in 4 weeks to follow-up and she can call or return to clinic sooner if she has problems.  I have sent that prescription in for her today.

## 2022-04-19 ENCOUNTER — TELEPHONE (OUTPATIENT)
Dept: FAMILY MEDICINE CLINIC | Age: 37
End: 2022-04-19

## 2022-04-19 RX ORDER — HYDROCHLOROTHIAZIDE 25 MG/1
25 TABLET ORAL DAILY
Qty: 90 TABLET | Refills: 1 | Status: SHIPPED | OUTPATIENT
Start: 2022-04-19 | End: 2022-06-17 | Stop reason: SDUPTHER

## 2022-04-19 NOTE — TELEPHONE ENCOUNTER
Caller: Ayesha Patel    Relationship: Self    Best call back number: [phone]    Requested Prescriptions:   Requested Prescriptions     Pending Prescriptions Disp Refills   • hydroCHLOROthiazide (HYDRODIURIL) 25 MG tablet 90 tablet 1     Sig: Take 1 tablet by mouth Daily.        Pharmacy where request should be sent: [facility] - [place], [place] - [address] [phone] [facility] [phone] FX     Does the patient have less than a 3 day supply:  [x] Yes  [] No    Nayeli Solis Rep   04/19/22 16:48 EDT

## 2022-05-17 NOTE — PROGRESS NOTES
Pt requesting refill of hydrocortisone cream for poison ivy. She has it on her abdomen and hands. Rash started 2 days ago. Med sent. F/u as needed. Pt v/u.

## 2022-06-10 ENCOUNTER — OFFICE VISIT (OUTPATIENT)
Dept: FAMILY MEDICINE CLINIC | Age: 37
End: 2022-06-10

## 2022-06-10 VITALS
BODY MASS INDEX: 32.55 KG/M2 | SYSTOLIC BLOOD PRESSURE: 137 MMHG | OXYGEN SATURATION: 98 % | HEIGHT: 65 IN | TEMPERATURE: 98.2 F | WEIGHT: 195.4 LBS | HEART RATE: 72 BPM | DIASTOLIC BLOOD PRESSURE: 84 MMHG

## 2022-06-10 DIAGNOSIS — R35.0 FREQUENCY OF URINATION: ICD-10-CM

## 2022-06-10 DIAGNOSIS — J01.00 ACUTE NON-RECURRENT MAXILLARY SINUSITIS: Primary | ICD-10-CM

## 2022-06-10 DIAGNOSIS — R05.9 COUGH: ICD-10-CM

## 2022-06-10 LAB
BILIRUB BLD-MCNC: NEGATIVE MG/DL
CLARITY, POC: CLEAR
COLOR UR: YELLOW
EXPIRATION DATE: NORMAL
GLUCOSE UR STRIP-MCNC: NEGATIVE MG/DL
KETONES UR QL: NEGATIVE
LEUKOCYTE EST, POC: NEGATIVE
Lab: NORMAL
NITRITE UR-MCNC: NEGATIVE MG/ML
PH UR: 7 [PH] (ref 5–8)
PROT UR STRIP-MCNC: NEGATIVE MG/DL
RBC # UR STRIP: NEGATIVE /UL
SP GR UR: 1.02 (ref 1–1.03)
UROBILINOGEN UR QL: NORMAL

## 2022-06-10 PROCEDURE — 99213 OFFICE O/P EST LOW 20 MIN: CPT | Performed by: PHYSICIAN ASSISTANT

## 2022-06-10 PROCEDURE — 81003 URINALYSIS AUTO W/O SCOPE: CPT | Performed by: PHYSICIAN ASSISTANT

## 2022-06-10 RX ORDER — BROMPHENIRAMINE MALEATE, PSEUDOEPHEDRINE HYDROCHLORIDE, AND DEXTROMETHORPHAN HYDROBROMIDE 2; 30; 10 MG/5ML; MG/5ML; MG/5ML
5 SYRUP ORAL 4 TIMES DAILY PRN
Qty: 118 ML | Refills: 0 | Status: SHIPPED | OUTPATIENT
Start: 2022-06-10 | End: 2023-01-30

## 2022-06-10 RX ORDER — AMOXICILLIN 875 MG/1
875 TABLET, COATED ORAL 2 TIMES DAILY
Qty: 20 TABLET | Refills: 0 | Status: SHIPPED | OUTPATIENT
Start: 2022-06-10 | End: 2022-06-20

## 2022-06-10 NOTE — PROGRESS NOTES
Subjective     CHIEF COMPLAINT    Chief Complaint   Patient presents with   • Urinary Tract Infection     Mainly frequency.    • Cough     Ongoing for 2 weeks.    • Nasal Congestion     Pt declines covid/flu test. Pt took at home covid test it was negative. Ongoing for 2 weeks.             This is a 36-year-old female presented clinic complaining of cough and sinus congestion for the last 2 weeks.  Her children have had similar symptoms and have mostly resolved but hers has been persistent and she feels it has been worsening for the last couple of days.  This is also been associated with some postnasal drainage.    Today she noticed that she was having to use the bathroom more frequently.  She denies any dysuria, urgency, or hematuria.            Review of Systems   Constitutional: Negative for chills, fatigue and fever.   HENT: Positive for congestion and ear pain. Negative for rhinorrhea and sore throat.    Respiratory: Positive for cough. Negative for shortness of breath and wheezing.    Cardiovascular: Negative for chest pain.   Gastrointestinal: Negative for abdominal pain, diarrhea, nausea and vomiting.   Genitourinary: Positive for frequency. Negative for dysuria, hematuria, urgency, vaginal bleeding, vaginal discharge and vaginal pain.   Musculoskeletal: Negative for myalgias.   Skin: Negative for rash.   Neurological: Negative for headaches.            Past Medical History:   Diagnosis Date   • Hypertension    • Thyroid cyst             History reviewed. No pertinent surgical history.         Family History   Problem Relation Age of Onset   • Hypertension Mother    • Heart disease Maternal Uncle    • Heart disease Maternal Grandmother    • Prostate cancer Paternal Grandfather         prostate   • Hyperlipidemia Father    • Prostate cancer Paternal Uncle         several P.Uncles   • Rheum arthritis Paternal Aunt         and giant cell arteritis   • Prostate cancer Paternal Uncle             Social History  "    Socioeconomic History   • Marital status:    Tobacco Use   • Smoking status: Never Smoker   • Smokeless tobacco: Never Used   Vaping Use   • Vaping Use: Never used   Substance and Sexual Activity   • Alcohol use: Never   • Drug use: Never   • Sexual activity: Yes     Partners: Male     Comment:  had vasectomy            Allergies   Allergen Reactions   • Lisinopril GI Intolerance     \"flipped her intestines inside out and hospitalized her\"            Current Outpatient Medications on File Prior to Visit   Medication Sig Dispense Refill   • Calcium Carbonate-Vitamin D (calcium-vitamin D) 500-200 MG-UNIT tablet per tablet Take 1 tablet by mouth Daily.     • Cyanocobalamin 1000 MCG capsule Take 1,000 mcg by mouth Daily.     • hydroCHLOROthiazide (HYDRODIURIL) 25 MG tablet Take 1 tablet by mouth Daily. 90 tablet 1   • hydrocortisone 2.5 % cream Apply 1 application topically to the appropriate area as directed 2 (Two) Times a Day. 30 g 0   • ibuprofen (ADVIL,MOTRIN) 800 MG tablet Take 1 tablet by mouth Every 8 (Eight) Hours As Needed for Moderate Pain . 60 tablet 0   • nystatin (MYCOSTATIN) 994501 UNIT/GM cream Apply 1 application topically to the appropriate area as directed Take As Directed.     • DULoxetine (CYMBALTA) 20 MG capsule Take 1 capsule by mouth Daily. 30 capsule 5     No current facility-administered medications on file prior to visit.            /84   Pulse 72   Temp 98.2 °F (36.8 °C) (Oral)   Ht 165.1 cm (65\")   Wt 88.6 kg (195 lb 6.4 oz)   SpO2 98% Comment: room air  BMI 32.52 kg/m²          Objective     Physical Exam  Vitals and nursing note reviewed.   Constitutional:       General: She is not in acute distress.     Appearance: Normal appearance.   HENT:      Head: Normocephalic and atraumatic.      Right Ear: Tympanic membrane, ear canal and external ear normal.      Left Ear: Tympanic membrane, ear canal and external ear normal.      Nose: No congestion or rhinorrhea. "      Right Sinus: Maxillary sinus tenderness present.      Left Sinus: Maxillary sinus tenderness and frontal sinus tenderness present.      Mouth/Throat:      Mouth: Mucous membranes are moist.      Pharynx: Oropharynx is clear. Posterior oropharyngeal erythema present.   Eyes:      Extraocular Movements: Extraocular movements intact.      Conjunctiva/sclera: Conjunctivae normal.      Pupils: Pupils are equal, round, and reactive to light.   Cardiovascular:      Rate and Rhythm: Normal rate and regular rhythm.      Heart sounds: Normal heart sounds.   Pulmonary:      Effort: Pulmonary effort is normal. No respiratory distress.      Breath sounds: Normal breath sounds. No wheezing.   Musculoskeletal:      Cervical back: Normal range of motion. No rigidity.   Lymphadenopathy:      Cervical: No cervical adenopathy.   Skin:     General: Skin is warm and dry.   Neurological:      Mental Status: She is alert and oriented to person, place, and time.   Psychiatric:         Mood and Affect: Mood normal.         Behavior: Behavior normal.              Procedures                    Lab Results (last 24 hours)     Procedure Component Value Units Date/Time    POCT urinalysis dipstick, automated [[id number]] Collected: 06/10/22 1305    Specimen: Urine Updated: 06/10/22 1306     Color Yellow     Clarity, UA Clear     Specific Gravity  1.020     pH, Urine 7.0     Leukocytes Negative     Nitrite, UA Negative     Protein, POC Negative mg/dL      Glucose, UA Negative mg/dL      Ketones, UA Negative     Urobilinogen, UA Normal     Bilirubin Negative     Blood, UA Negative     Lot Number [id number]     Expiration Date 02/28/2023        She declines COVID-19 and flu testing.  She states she took an at-home COVID-19 test this morning that was negative.         No Radiology Exams Resulted Within Past 24 Hours                    Diagnoses and all orders for this visit:    1. Acute non-recurrent maxillary sinusitis (Primary)  -     amoxicillin  (AMOXIL) 875 MG tablet; Take 1 tablet by mouth 2 (Two) Times a Day for 10 days.  Dispense: 20 tablet; Refill: 0    2. Cough  -     brompheniramine-pseudoephedrine-DM 30-2-10 MG/5ML syrup; Take 5 mL by mouth 4 (Four) Times a Day As Needed for Allergies.  Dispense: 118 mL; Refill: 0    3. Frequency of urination  Comments:  No signs of UTI on POC UA.  Symptoms not consistent with UTI.  If concerns persist please follow-up with PCP for further evaluation.  Orders:  -     POCT urinalysis dipstick, automated                           FOR FULL DISCHARGE INSTRUCTIONS/COMMENTS/HANDOUTS please see the AVS

## 2022-06-17 ENCOUNTER — TELEPHONE (OUTPATIENT)
Dept: FAMILY MEDICINE CLINIC | Age: 37
End: 2022-06-17

## 2022-06-17 RX ORDER — IBUPROFEN 800 MG/1
800 TABLET ORAL EVERY 8 HOURS PRN
Qty: 60 TABLET | Refills: 0 | Status: SHIPPED | OUTPATIENT
Start: 2022-06-17

## 2022-06-17 RX ORDER — HYDROCHLOROTHIAZIDE 25 MG/1
25 TABLET ORAL DAILY
Qty: 90 TABLET | Refills: 1 | Status: SHIPPED | OUTPATIENT
Start: 2022-06-17

## 2022-06-17 NOTE — TELEPHONE ENCOUNTER
Caller: Ayesha Patel    Relationship: Self    Best call back number: [phone]    Requested Prescriptions:   Requested Prescriptions     Pending Prescriptions Disp Refills   • ibuprofen (ADVIL,MOTRIN) 800 MG tablet 60 tablet 0     Sig: Take 1 tablet by mouth Every 8 (Eight) Hours As Needed for Moderate Pain .   • hydroCHLOROthiazide (HYDRODIURIL) 25 MG tablet 90 tablet 1     Sig: Take 1 tablet by mouth Daily.        Pharmacy where request should be sent: [facility] [phone] [place] [phone] FX       Does the patient have less than a 3 day supply:  [x] Yes  [] No    Nayeli Solis Rep   06/17/22 11:53 EDT

## 2023-01-30 ENCOUNTER — OFFICE VISIT (OUTPATIENT)
Dept: FAMILY MEDICINE CLINIC | Age: 38
End: 2023-01-30
Payer: COMMERCIAL

## 2023-01-30 VITALS
TEMPERATURE: 97.1 F | HEIGHT: 65 IN | OXYGEN SATURATION: 100 % | SYSTOLIC BLOOD PRESSURE: 148 MMHG | WEIGHT: 195 LBS | DIASTOLIC BLOOD PRESSURE: 87 MMHG | BODY MASS INDEX: 32.49 KG/M2 | HEART RATE: 70 BPM

## 2023-01-30 DIAGNOSIS — J40 BRONCHITIS: Primary | ICD-10-CM

## 2023-01-30 DIAGNOSIS — I10 BENIGN ESSENTIAL HYPERTENSION: ICD-10-CM

## 2023-01-30 DIAGNOSIS — R05.1 ACUTE COUGH: ICD-10-CM

## 2023-01-30 LAB
EXPIRATION DATE: NORMAL
FLUAV AG UPPER RESP QL IA.RAPID: NOT DETECTED
FLUBV AG UPPER RESP QL IA.RAPID: NOT DETECTED
INTERNAL CONTROL: NORMAL
Lab: NORMAL
SARS-COV-2 AG UPPER RESP QL IA.RAPID: NOT DETECTED

## 2023-01-30 PROCEDURE — 87428 SARSCOV & INF VIR A&B AG IA: CPT

## 2023-01-30 PROCEDURE — 99213 OFFICE O/P EST LOW 20 MIN: CPT

## 2023-01-30 RX ORDER — BENZONATATE 100 MG/1
100 CAPSULE ORAL 3 TIMES DAILY PRN
Qty: 21 CAPSULE | Refills: 0 | Status: SHIPPED | OUTPATIENT
Start: 2023-01-30 | End: 2023-02-13

## 2023-01-30 NOTE — PROGRESS NOTES
"Subjective     CHIEF COMPLAINT    Chief Complaint   Patient presents with   • Cough     X 1 week       History of Present Illness  Patient is a 37-year-old female who presents to the clinic today with complaints of cough and headache.  Symptoms have been present for 1 week.  Denies any fever, chills, shortness of breath, wheezing or chest pain.  Reports that her son was sick prior to this.  She does not cough much during the day but does report a lot of coughing at night.  She also describes a tickle in her throat.  She has been taking Bromfed at home well as DayQuil and NyQuil.  She is a non-smoker with no chronic lung issues.    Review of Systems   Constitutional: Negative for chills and fever.   HENT: Negative for congestion, rhinorrhea and sore throat.    Respiratory: Positive for cough. Negative for shortness of breath and wheezing.    Cardiovascular: Negative for chest pain.   Musculoskeletal: Negative for myalgias.   Neurological: Positive for headaches.       Allergies   Allergen Reactions   • Lisinopril GI Intolerance     \"flipped her intestines inside out and hospitalized her\"       Current Outpatient Medications on File Prior to Visit   Medication Sig Dispense Refill   • hydroCHLOROthiazide (HYDRODIURIL) 25 MG tablet Take 1 tablet by mouth Daily. 90 tablet 1   • hydrocortisone 2.5 % cream Apply 1 application topically to the appropriate area as directed 2 (Two) Times a Day. 30 g 0   • ibuprofen (ADVIL,MOTRIN) 800 MG tablet Take 1 tablet by mouth Every 8 (Eight) Hours As Needed for Moderate Pain . 60 tablet 0   • nystatin (MYCOSTATIN) 741010 UNIT/GM cream Apply 1 application topically to the appropriate area as directed Take As Directed.     • [DISCONTINUED] brompheniramine-pseudoephedrine-DM 30-2-10 MG/5ML syrup Take 5 mL by mouth 4 (Four) Times a Day As Needed for Allergies. 118 mL 0   • [DISCONTINUED] Calcium Carbonate-Vitamin D (calcium-vitamin D) 500-200 MG-UNIT tablet per tablet Take 1 tablet by " "mouth Daily.     • [DISCONTINUED] Cyanocobalamin 1000 MCG capsule Take 1,000 mcg by mouth Daily.     • [DISCONTINUED] DULoxetine (CYMBALTA) 20 MG capsule Take 1 capsule by mouth Daily. 30 capsule 5     No current facility-administered medications on file prior to visit.       /87 (BP Location: Left arm, Patient Position: Sitting, Cuff Size: Large Adult)   Pulse 70   Temp 97.1 °F (36.2 °C) (Oral)   Ht 165.1 cm (65\")   Wt 88.5 kg (195 lb)   SpO2 100%   BMI 32.45 kg/m²     Objective     Physical Exam  Vitals and nursing note reviewed.   Constitutional:       General: She is not in acute distress.     Appearance: Normal appearance. She is not ill-appearing.   HENT:      Head: Normocephalic and atraumatic.      Right Ear: Tympanic membrane, ear canal and external ear normal.      Left Ear: Tympanic membrane, ear canal and external ear normal.      Nose: Nose normal. No congestion.      Right Sinus: No maxillary sinus tenderness or frontal sinus tenderness.      Left Sinus: No maxillary sinus tenderness or frontal sinus tenderness.      Mouth/Throat:      Lips: Pink.      Mouth: Mucous membranes are moist.      Pharynx: Posterior oropharyngeal erythema present. No pharyngeal swelling, oropharyngeal exudate or uvula swelling.   Eyes:      Extraocular Movements: Extraocular movements intact.      Pupils: Pupils are equal, round, and reactive to light.   Cardiovascular:      Rate and Rhythm: Normal rate and regular rhythm.      Heart sounds: Normal heart sounds. No murmur heard.  Pulmonary:      Effort: Pulmonary effort is normal. No accessory muscle usage or respiratory distress.      Breath sounds: Normal breath sounds. No wheezing or rhonchi.   Musculoskeletal:      Cervical back: Normal range of motion.   Lymphadenopathy:      Cervical: No cervical adenopathy.   Skin:     General: Skin is warm and dry.   Neurological:      General: No focal deficit present.      Mental Status: She is alert and oriented to " person, place, and time.   Psychiatric:         Mood and Affect: Mood and affect normal.         Behavior: Behavior normal.          Lab Results (last 24 hours)     Procedure Component Value Units Date/Time    POCT SARS-CoV-2 Antigen SUKHWINDER + Flu [[id number]] Collected: 01/30/23 0902    Specimen: Swab Updated: 01/30/23 0902     SARS Antigen Not Detected     Influenza A Antigen SUKHWINDER Not Detected     Influenza B Antigen SUKHWINDER Not Detected     Internal Control Passed     Lot Number [id number]     Expiration Date 11-19-23           Diagnoses and all orders for this visit:    1. Bronchitis (Primary)  Comments:  Symptomatic treatment discussed, handout provided. Increase fluids, rest     2. Acute cough  Comments:  Negative covid and flu. Mucinex if cough productive, tessalon perles otherwise. Patient declines CXR today   Orders:  -     POCT SARS-CoV-2 Antigen SUKHWINDER + Flu  -     benzonatate (Tessalon Perles) 100 MG capsule; Take 1 capsule by mouth 3 (Three) Times a Day As Needed for Cough.  Dispense: 21 capsule; Refill: 0    3. Benign essential hypertension  Comments:  Avoid decongestants       No evidence of bacterial infection at this time.  Symptoms are likely viral; allergies or GERD may also be contributing.  Recommend patient trial antihistamine and Flonase.  Can also trial over-the-counter Prilosec. Return to clinic if symptoms worsen or do not improve. For any shortness of breath or chest pain, patient was instructed to proceed to ER. Patient voiced understanding of all instructions and had no further questions at this time.       Follow up:   Return if symptoms worsen or fail to improve.  Patient was given instructions and counseling regarding her condition or for health maintenance advice. Please see specific information pulled into the AVS if appropriate.

## 2023-02-13 ENCOUNTER — TELEMEDICINE (OUTPATIENT)
Dept: FAMILY MEDICINE CLINIC | Age: 38
End: 2023-02-13
Payer: COMMERCIAL

## 2023-02-13 ENCOUNTER — TELEPHONE (OUTPATIENT)
Dept: FAMILY MEDICINE CLINIC | Age: 38
End: 2023-02-13

## 2023-02-13 VITALS — BODY MASS INDEX: 32.49 KG/M2 | HEIGHT: 65 IN | WEIGHT: 195 LBS

## 2023-02-13 DIAGNOSIS — J06.9 ACUTE URI: Primary | ICD-10-CM

## 2023-02-13 PROCEDURE — 99213 OFFICE O/P EST LOW 20 MIN: CPT | Performed by: NURSE PRACTITIONER

## 2023-02-13 RX ORDER — PREDNISONE 20 MG/1
40 TABLET ORAL DAILY
Qty: 10 TABLET | Refills: 0 | Status: SHIPPED | OUTPATIENT
Start: 2023-02-13

## 2023-02-13 RX ORDER — ALBUTEROL SULFATE 2.5 MG/.5ML
2.5 SOLUTION RESPIRATORY (INHALATION) EVERY 6 HOURS PRN
Qty: 15 ML | Refills: 0 | Status: SHIPPED | OUTPATIENT
Start: 2023-02-13 | End: 2023-02-13 | Stop reason: DRUGHIGH

## 2023-02-13 RX ORDER — DOXYCYCLINE HYCLATE 100 MG
100 TABLET ORAL 2 TIMES DAILY
Qty: 20 TABLET | Refills: 0 | Status: SHIPPED | OUTPATIENT
Start: 2023-02-13 | End: 2023-02-23

## 2023-02-13 RX ORDER — HYDROCODONE POLISTIREX AND CHLORPHENIRAMINE POLISTIREX 10; 8 MG/5ML; MG/5ML
5 SUSPENSION, EXTENDED RELEASE ORAL EVERY 12 HOURS PRN
Qty: 118 ML | Refills: 0 | Status: SHIPPED | OUTPATIENT
Start: 2023-02-13

## 2023-02-13 RX ORDER — ALBUTEROL SULFATE 1.25 MG/3ML
1 SOLUTION RESPIRATORY (INHALATION) EVERY 6 HOURS PRN
Qty: 30 EACH | Refills: 0 | Status: SHIPPED | OUTPATIENT
Start: 2023-02-13

## 2023-02-13 NOTE — PROGRESS NOTES
"Chief Complaint  Cough (X 3 weeks *Video visit [phone]*)    Subjective          Ayesha Patel presents to [facility] for video visit. She has had cough x 3 weeks. She was seen by LAKESHIA Hidalgo and was told that it was viral. She was rx tessalon Perles. She has tried stahist AD and some bromfed she had at home. States she does get mildly soa when she can't stop coughing. Denies fever, chills, nausea, vomiting, diarrhea, chest pain, loss of taste or smell. States she has a severe headache d/t constant coughing.       Objective   Vital Signs:   Vitals:    02/13/23 0907   Weight: 88.5 kg (195 lb)   Height: 165.1 cm (65\")       Physical Exam  Vitals reviewed.   Constitutional:       General: She is not in acute distress.     Appearance: She is well-developed. She is ill-appearing.   HENT:      Head: Atraumatic.   Pulmonary:      Effort: Pulmonary effort is normal. No respiratory distress.   Neurological:      Mental Status: She is alert and oriented to person, place, and time.   Psychiatric:         Mood and Affect: Mood and affect normal.         Behavior: Behavior normal.         Thought Content: Thought content normal.         Judgment: Judgment normal.          Result Review :              Assessment and Plan    Diagnoses and all orders for this visit:    1. Acute URI (Primary)  Comments:  Increase fluid intake and rest.  Tylenol/ibuprofen for discomfort.  Complete prescribed antibiotic.  Go to ED with any shortness of air.  Orders:  -     Hydrocod Raul-Chlorphe Raul ER (TUSSIONEX PENNKINETIC) 10-8 MG/5ML ER suspension; Take 5 mL by mouth Every 12 (Twelve) Hours As Needed for Cough.  Dispense: 118 mL; Refill: 0    Other orders  -     doxycycline (VIBRAMYICN) 100 MG tablet; Take 1 tablet by mouth 2 (Two) Times a Day for 10 days.  Dispense: 20 tablet; Refill: 0  -     predniSONE (DELTASONE) 20 MG tablet; Take 2 tablets by mouth Daily.  Dispense: 10 tablet; Refill: 0  - "     albuterol (PROVENTIL) 2.5 MG/0.5ML nebulizer solution; Take 2.5 mg by nebulization Every 6 (Six) Hours As Needed for Wheezing.  Dispense: 15 mL; Refill: 0    Patient to notify office with any acute concerns or issues.  Patient verbalizes understanding, agrees with plan of care and has no further questions upon discharge.    Please note that portions of this note were completed with a voice recognition program.    This was an audio and video enabled telemedicine encounter. You have chosen to receive care through a telephone visit. Do you consent to use a telephone visit for your medical care today? Yes  Patient is at residence. Provider at office. Doximity used. Nadja Aguilar MA also present.  Follow Up    Return if symptoms worsen or fail to improve.  Patient was given instructions and counseling regarding her condition or for health maintenance advice. Please see specific information pulled into the AVS if appropriate.

## 2023-02-13 NOTE — TELEPHONE ENCOUNTER
Laendra called questioning the Albuterol neb solution.  States the dose 0.083% is not on the rx and the qty is not correct.  Please re send rx.

## 2023-04-14 RX ORDER — HYDROCHLOROTHIAZIDE 25 MG/1
25 TABLET ORAL DAILY
Qty: 90 TABLET | Refills: 0 | Status: SHIPPED | OUTPATIENT
Start: 2023-04-14

## 2023-06-15 RX ORDER — HYDROCHLOROTHIAZIDE 25 MG/1
25 TABLET ORAL DAILY
Qty: 90 TABLET | Refills: 0 | Status: SHIPPED | OUTPATIENT
Start: 2023-06-15

## 2023-09-12 ENCOUNTER — OFFICE VISIT (OUTPATIENT)
Dept: FAMILY MEDICINE CLINIC | Age: 38
End: 2023-09-12
Payer: COMMERCIAL

## 2023-09-12 VITALS
HEART RATE: 65 BPM | DIASTOLIC BLOOD PRESSURE: 84 MMHG | WEIGHT: 180.6 LBS | BODY MASS INDEX: 30.09 KG/M2 | OXYGEN SATURATION: 100 % | SYSTOLIC BLOOD PRESSURE: 150 MMHG | HEIGHT: 65 IN

## 2023-09-12 DIAGNOSIS — L23.7 POISON IVY DERMATITIS: Primary | ICD-10-CM

## 2023-09-12 PROCEDURE — 96372 THER/PROPH/DIAG INJ SC/IM: CPT | Performed by: NURSE PRACTITIONER

## 2023-09-12 PROCEDURE — 99213 OFFICE O/P EST LOW 20 MIN: CPT | Performed by: NURSE PRACTITIONER

## 2023-09-12 RX ORDER — AMITRIPTYLINE HYDROCHLORIDE 10 MG/1
1 TABLET, FILM COATED ORAL
COMMUNITY
Start: 2023-05-01

## 2023-09-12 RX ORDER — POLYETHYLENE GLYCOL-3350 AND ELECTROLYTES 236; 6.74; 5.86; 2.97; 22.74 G/274.31G; G/274.31G; G/274.31G; G/274.31G; G/274.31G
POWDER, FOR SOLUTION ORAL
COMMUNITY
Start: 2023-06-28

## 2023-09-12 RX ORDER — DICYCLOMINE HCL 20 MG
20 TABLET ORAL
COMMUNITY
Start: 2023-07-27

## 2023-09-12 RX ORDER — TRIAMCINOLONE ACETONIDE 40 MG/ML
60 INJECTION, SUSPENSION INTRA-ARTICULAR; INTRAMUSCULAR ONCE
Status: COMPLETED | OUTPATIENT
Start: 2023-09-12 | End: 2023-09-12

## 2023-09-12 RX ORDER — NORETHINDRONE ACETATE AND ETHINYL ESTRADIOL AND FERROUS FUMARATE 1MG-20(24)
1 KIT ORAL DAILY
COMMUNITY
Start: 2023-05-25

## 2023-09-12 RX ADMIN — TRIAMCINOLONE ACETONIDE 60 MG: 40 INJECTION, SUSPENSION INTRA-ARTICULAR; INTRAMUSCULAR at 17:00

## 2023-09-12 NOTE — PROGRESS NOTES
"Chief Complaint  Rash (Possible poison ivy on hands. )    Subjective        Ayesha Patel presents to [facility]  Rash  This is a new problem. The current episode started in the past 7 days. The problem has been gradually worsening since onset. The affected locations include the face and right hand. The rash is characterized by blistering, itchiness and redness. She was exposed to plant contact. Pertinent negatives include no eye pain, facial edema or shortness of breath. Past treatments include antihistamine, anti-itch cream and topical steroids. The treatment provided no relief.     Objective   Vital Signs:  /84 (BP Location: Left arm, Patient Position: Sitting)   Pulse 65   Ht 165.1 cm (65\")   Wt 81.9 kg (180 lb 9.6 oz)   SpO2 100%   BMI 30.05 kg/m²   Estimated body mass index is 30.05 kg/m² as calculated from the following:    Height as of this encounter: 165.1 cm (65\").    Weight as of this encounter: 81.9 kg (180 lb 9.6 oz).             Physical Exam  Cardiovascular:      Rate and Rhythm: Normal rate.   Pulmonary:      Effort: Pulmonary effort is normal.   Skin:     General: Skin is warm and dry.      Findings: Rash present.             Comments: Poison ivy on face and right hand   Neurological:      Mental Status: She is alert and oriented to person, place, and time.   Psychiatric:         Mood and Affect: Mood normal.      Result Review :                   Assessment and Plan   Diagnoses and all orders for this visit:    1. Poison ivy dermatitis (Primary)  Comments:  Go to the eye doctor urgently if develops in eye or blurred vision  Orders:  -     triamcinolone acetonide (KENALOG-40) injection 60 mg  -     triamcinolone (KENALOG) 0.1 % ointment; Apply 1 application  topically to the appropriate area as directed 2 (Two) Times a Day.  Dispense: 80 g; Refill: 0             Follow Up   Return if symptoms worsen or fail to improve.  Patient was given " instructions and counseling regarding her condition or for health maintenance advice. Please see specific information pulled into the AVS if appropriate.

## 2023-09-14 ENCOUNTER — TELEPHONE (OUTPATIENT)
Dept: FAMILY MEDICINE CLINIC | Age: 38
End: 2023-09-14
Payer: COMMERCIAL

## 2023-09-14 DIAGNOSIS — L23.7 POISON IVY DERMATITIS: Primary | ICD-10-CM

## 2023-09-14 RX ORDER — PREDNISONE 10 MG/1
TABLET ORAL
Qty: 21 TABLET | Refills: 0 | Status: SHIPPED | OUTPATIENT
Start: 2023-09-14 | End: 2023-09-23

## 2023-09-14 NOTE — TELEPHONE ENCOUNTER
Pt states that the steroid injection you gave didn't help her at all w/her poison ivy and you told her if it didn't, you would give her a steroid pk.  She is asking if you can send that in to [facility].

## 2023-10-27 RX ORDER — HYDROCHLOROTHIAZIDE 25 MG/1
25 TABLET ORAL DAILY
Qty: 30 TABLET | Refills: 1 | Status: SHIPPED | OUTPATIENT
Start: 2023-10-27

## 2024-01-11 ENCOUNTER — OFFICE VISIT (OUTPATIENT)
Dept: FAMILY MEDICINE CLINIC | Age: 39
End: 2024-01-11
Payer: COMMERCIAL

## 2024-01-11 VITALS
TEMPERATURE: 98.7 F | WEIGHT: 186 LBS | DIASTOLIC BLOOD PRESSURE: 75 MMHG | OXYGEN SATURATION: 98 % | HEART RATE: 83 BPM | SYSTOLIC BLOOD PRESSURE: 134 MMHG | HEIGHT: 66 IN | BODY MASS INDEX: 29.89 KG/M2

## 2024-01-11 DIAGNOSIS — Z11.59 ENCOUNTER FOR SCREENING FOR OTHER VIRAL DISEASES: ICD-10-CM

## 2024-01-11 DIAGNOSIS — R73.09 ELEVATED GLUCOSE: ICD-10-CM

## 2024-01-11 DIAGNOSIS — I10 BENIGN ESSENTIAL HYPERTENSION: Primary | ICD-10-CM

## 2024-01-11 PROCEDURE — 99214 OFFICE O/P EST MOD 30 MIN: CPT | Performed by: NURSE PRACTITIONER

## 2024-01-11 RX ORDER — HYDROCHLOROTHIAZIDE 25 MG/1
37.5 TABLET ORAL DAILY
Qty: 135 TABLET | Refills: 3 | Status: SHIPPED | OUTPATIENT
Start: 2024-01-11

## 2024-01-11 NOTE — PROGRESS NOTES
"Chief Complaint  Hypertension    Subjective          Ayesha Patel presents to [facility] for HTN f/u. It has been running slightly elevated at other provider appts. Does not check at home.     Pt is getting a biopsy of nodules to right upper quadrant on the 17th with oncology. She did have recent blood work with them and glucose was elevated.     Does not have any other acute concerns or issues at this time.       Objective   Vital Signs:   Vitals:    01/11/24 1039   BP: 134/75   BP Location: Right arm   Patient Position: Sitting   Cuff Size: Large Adult   Pulse: 83   Temp: 98.7 °F (37.1 °C)   TempSrc: Oral   SpO2: 98%   Weight: 84.4 kg (186 lb)   Height: 167.6 cm (66\")       Physical Exam  Vitals reviewed.   Constitutional:       General: She is not in acute distress.     Appearance: Normal appearance. She is well-developed.   HENT:      Head: Normocephalic and atraumatic.   Eyes:      Conjunctiva/sclera: Conjunctivae normal.      Pupils: Pupils are equal, round, and reactive to light.   Neck:      Vascular: No carotid bruit.   Cardiovascular:      Rate and Rhythm: Normal rate and regular rhythm.      Heart sounds: Normal heart sounds. No murmur heard.  Pulmonary:      Effort: Pulmonary effort is normal. No respiratory distress.      Breath sounds: Normal breath sounds.   Skin:     General: Skin is warm and dry.   Neurological:      Mental Status: She is alert and oriented to person, place, and time.   Psychiatric:         Mood and Affect: Mood and affect normal.         Behavior: Behavior normal.         Thought Content: Thought content normal.         Judgment: Judgment normal.          Result Review :              Assessment and Plan    Diagnoses and all orders for this visit:    1. Benign essential hypertension (Primary)  Assessment & Plan:  Increasing HCTZ from 25 mg to 37.5 mg daily.  Patient is to continue to check blood pressure at home and monitor at other provider " appointments.  If staying elevated can go ahead and take 2 whole tablets daily.      Orders:  -     Comprehensive Metabolic Panel; Future  -     Lipid Panel; Future  -     hydroCHLOROthiazide (HYDRODIURIL) 25 MG tablet; Take 1.5 tablets by mouth Daily.  Dispense: 135 tablet; Refill: 3    2. Elevated glucose  Comments:  Will notify patient of results and treat accordingly.  Orders:  -     Hemoglobin A1c; Future    3. Encounter for screening for other viral diseases  Comments:  Will notify patient of results and treat accordingly.  Orders:  -     Hepatitis C Antibody; Future    Patient to notify office with any acute concerns or issues.  Patient verbalizes understanding, agrees with plan of care and has no further questions upon discharge.    Please note that portions of this note were completed with a voice recognition program.    Follow Up    Return in about 1 year (around 1/11/2025) for Annual physical.  Patient was given instructions and counseling regarding her condition or for health maintenance advice. Please see specific information pulled into the AVS if appropriate.

## 2024-01-11 NOTE — ASSESSMENT & PLAN NOTE
Increasing HCTZ from 25 mg to 37.5 mg daily.  Patient is to continue to check blood pressure at home and monitor at other provider appointments.  If staying elevated can go ahead and take 2 whole tablets daily.

## 2024-02-20 ENCOUNTER — TELEPHONE (OUTPATIENT)
Dept: FAMILY MEDICINE CLINIC | Age: 39
End: 2024-02-20
Payer: COMMERCIAL

## 2024-06-26 ENCOUNTER — TELEMEDICINE (OUTPATIENT)
Dept: FAMILY MEDICINE CLINIC | Age: 39
End: 2024-06-26
Payer: COMMERCIAL

## 2024-06-26 DIAGNOSIS — J02.9 SORETHROAT: Primary | ICD-10-CM

## 2024-06-26 RX ORDER — ETONOGESTREL AND ETHINYL ESTRADIOL VAGINAL RING .015; .12 MG/D; MG/D
1 RING VAGINAL
COMMUNITY
Start: 2024-02-02 | End: 2025-02-01

## 2024-08-20 ENCOUNTER — OFFICE VISIT (OUTPATIENT)
Dept: FAMILY MEDICINE CLINIC | Age: 39
End: 2024-08-20
Payer: COMMERCIAL

## 2024-08-20 VITALS
HEIGHT: 66 IN | DIASTOLIC BLOOD PRESSURE: 88 MMHG | TEMPERATURE: 99.2 F | OXYGEN SATURATION: 99 % | WEIGHT: 192.6 LBS | BODY MASS INDEX: 30.95 KG/M2 | HEART RATE: 76 BPM | SYSTOLIC BLOOD PRESSURE: 139 MMHG

## 2024-08-20 DIAGNOSIS — E66.09 CLASS 1 OBESITY DUE TO EXCESS CALORIES WITH SERIOUS COMORBIDITY AND BODY MASS INDEX (BMI) OF 31.0 TO 31.9 IN ADULT: ICD-10-CM

## 2024-08-20 DIAGNOSIS — R21 RASH: ICD-10-CM

## 2024-08-20 DIAGNOSIS — M25.50 ARTHRALGIA OF MULTIPLE SITES: ICD-10-CM

## 2024-08-20 DIAGNOSIS — I10 PRIMARY HYPERTENSION: Primary | ICD-10-CM

## 2024-08-20 PROBLEM — R53.81 MALAISE: Status: RESOLVED | Noted: 2021-11-02 | Resolved: 2024-08-20

## 2024-08-20 PROBLEM — N64.4 BREAST PAIN: Status: RESOLVED | Noted: 2021-06-21 | Resolved: 2024-08-20

## 2024-08-20 PROBLEM — E04.1 THYROID CYST: Chronic | Status: RESOLVED | Noted: 2021-02-11 | Resolved: 2024-08-20

## 2024-08-20 PROBLEM — M79.10 MYALGIA: Status: RESOLVED | Noted: 2021-06-21 | Resolved: 2024-08-20

## 2024-08-20 PROCEDURE — 99214 OFFICE O/P EST MOD 30 MIN: CPT | Performed by: NURSE PRACTITIONER

## 2024-08-20 RX ORDER — NYSTATIN 100000 [USP'U]/G
POWDER TOPICAL 3 TIMES DAILY
Qty: 60 G | Refills: 5 | Status: SHIPPED | OUTPATIENT
Start: 2024-08-20

## 2024-08-20 RX ORDER — MELOXICAM 15 MG/1
15 TABLET ORAL DAILY
Qty: 90 TABLET | Refills: 1 | Status: SHIPPED | OUTPATIENT
Start: 2024-08-20

## 2024-08-20 RX ORDER — AMLODIPINE BESYLATE 5 MG/1
5 TABLET ORAL DAILY
Qty: 90 TABLET | Refills: 1 | Status: SHIPPED | OUTPATIENT
Start: 2024-08-20

## 2024-08-20 RX ORDER — NYSTATIN 100000 U/G
1 CREAM TOPICAL 2 TIMES DAILY
Qty: 30 G | Refills: 1 | Status: SHIPPED | OUTPATIENT
Start: 2024-08-20

## 2024-09-11 NOTE — PROGRESS NOTES
"Chief Complaint  Hypertension    Subjective          Ayesha Patel presents to [facility] for HTN f/u. Pt is taking hctz 37.5mg daily for HTN. BP is elevated and pt states she has been having heading and pulsing sensation when she is bending over to pick things up. Pt sees gynecology for NuvaRing and routine exam. She is requesting refill of Nystatin cream and powder for occasional yeast infection under breasts. She is requesting medication to help with generalized arthralgias and body aches. Has tried multiple otc NSAIDs.       Objective   Vital Signs:   Vitals:    08/20/24 1450   BP: 139/88   BP Location: Right arm   Patient Position: Sitting   Cuff Size: Large Adult   Pulse: 76   Temp: 99.2 °F (37.3 °C)   TempSrc: Oral   SpO2: 99%   Weight: 87.4 kg (192 lb 9.6 oz)   Height: 167.6 cm (65.98\")       Body mass index is 31.1 kg/m².        Physical Exam  Vitals reviewed.   Constitutional:       General: She is not in acute distress.     Appearance: Normal appearance. She is well-developed.   HENT:      Head: Normocephalic and atraumatic.   Eyes:      Conjunctiva/sclera: Conjunctivae normal.      Pupils: Pupils are equal, round, and reactive to light.   Cardiovascular:      Rate and Rhythm: Normal rate and regular rhythm.      Heart sounds: Normal heart sounds. No murmur heard.  Pulmonary:      Effort: Pulmonary effort is normal. No respiratory distress.      Breath sounds: Normal breath sounds.   Skin:     General: Skin is warm and dry.   Neurological:      Mental Status: She is alert and oriented to person, place, and time.   Psychiatric:         Mood and Affect: Mood and affect normal.         Behavior: Behavior normal.         Thought Content: Thought content normal.         Judgment: Judgment normal.            Lab Results   Component Value Date    GLUCOSE 121 (H) 11/02/2021    BUN 11 01/20/2023    CREATININE 0.78 01/20/2023    BCR 14.1 01/20/2023    K 4.4 01/20/2023    CO2 " 24 01/20/2023    CALCIUM 9.6 01/20/2023    ALBUMIN 4.6 01/20/2023    BILITOT 0.7 01/20/2023    AST 12 01/20/2023    ALT 17 01/20/2023     Lab Results   Component Value Date    HGBA1C 5.30 02/08/2021     Lab Results   Component Value Date    WBC 10.41 12/28/2023    HGB 14.0 12/28/2023    HCT 42.4 12/28/2023    MCV 87.1 12/28/2023     12/28/2023     Lab Results   Component Value Date    CHOL 162 11/02/2021    CHLPL 173 11/20/2020     Lab Results   Component Value Date    TRIG 162 (H) 11/02/2021    TRIG 137 11/20/2020     Lab Results   Component Value Date    HDL 33 (L) 11/02/2021    HDL 33 (L) 11/20/2020     Lab Results   Component Value Date     11/02/2021     (H) 11/20/2020     Lab Results   Component Value Date    TSH 1.610 11/02/2021              Assessment and Plan    Assessment & Plan  Primary hypertension   continue hctz 37.5mg and start amlodipine 5mg daily. Low Na diet. Continue weight loss efforts.   Rash  Try to prevent skin on skin and try to keep area dry as possible. Cream and powder as needed.  Arthralgia of multiple sites  Pt to try meloxicam. Potential side effects of medication discussed.     Class 1 obesity due to excess calories with serious comorbidity and body mass index (BMI) of 31.0 to 31.9 in adult  Patient's (Body mass index is 31.1 kg/m².) indicates that they are obese (BMI >30) with health conditions that include hypertension . Weight is worsening. BMI  is above average; BMI management plan is completed. We discussed portion control and increasing exercise.      New Medications Ordered This Visit   Medications    amLODIPine (NORVASC) 5 MG tablet     Sig: Take 1 tablet by mouth Daily.     Dispense:  90 tablet     Refill:  1    nystatin (MYCOSTATIN) [id number] UNIT/GM powder     Sig: Apply  topically to the appropriate area as directed 3 (Three) Times a Day.     Dispense:  60 g     Refill:  5    nystatin (MYCOSTATIN) [id number] UNIT/GM cream     Sig: Apply 1 Application topically  to the appropriate area as directed 2 (Two) Times a Day.     Dispense:  30 g     Refill:  1    meloxicam (Mobic) 15 MG tablet     Sig: Take 1 tablet by mouth Daily.     Dispense:  90 tablet     Refill:  1         Patient to notify office with any acute concerns or issues.  Patient verbalizes understanding, agrees with plan of care and has no further questions upon discharge.    Please note that portions of this note were completed with a voice recognition program.      Follow Up    Return in about 4 weeks (around 9/17/2024) for Annual physical.  Patient was given instructions and counseling regarding her condition or for health maintenance advice. Please see specific information pulled into the AVS if appropriate.     Medications Discontinued During This Encounter   Medication Reason    Baclofen powder Historical Med - Therapy completed    nystatin (MYCOSTATIN) [id number] UNIT/GM cream Reorder    ibuprofen (ADVIL,MOTRIN) 800 MG tablet

## 2024-10-11 ENCOUNTER — TELEPHONE (OUTPATIENT)
Dept: FAMILY MEDICINE CLINIC | Age: 39
End: 2024-10-11
Payer: COMMERCIAL

## 2024-10-11 DIAGNOSIS — Z12.31 ENCOUNTER FOR SCREENING MAMMOGRAM FOR BREAST CANCER: Primary | ICD-10-CM

## 2024-10-11 NOTE — TELEPHONE ENCOUNTER
Caller: Ayesha Patel    Relationship: Self    Best call back number: [phone]     What was the call regarding: PATIENT STATES SHE WOULD LIKE TO SPEAK WITH CLINICAL STAFF REGARDING QUESTIONS SHE HAS ABOUT MAMMOGRAM ORDERS. PATIENT STATES SHE WOULD FEEL MORE COMFORTABLE SPEAKING TO A NURSE ABOUT IT.

## 2025-01-08 ENCOUNTER — TELEPHONE (OUTPATIENT)
Dept: FAMILY MEDICINE CLINIC | Age: 40
End: 2025-01-08
Payer: COMMERCIAL

## 2025-01-13 ENCOUNTER — TELEPHONE (OUTPATIENT)
Dept: FAMILY MEDICINE CLINIC | Age: 40
End: 2025-01-13

## 2025-01-13 DIAGNOSIS — Z00.00 ROUTINE GENERAL MEDICAL EXAMINATION AT A HEALTH CARE FACILITY: Primary | ICD-10-CM

## 2025-01-13 NOTE — TELEPHONE ENCOUNTER
Caller: Ayesha Patel    Relationship to patient: Self    Best call back number: [phone]     Patient is needing: PATIENT WENT TO GET HER LABS DONE BUT WAS TOLD BY THE LAB STAFF THE ORDERS WERE . PLEASE RESUBMIT LAB ORDERS. PATIENT WILL RESCHEDULE HER APPOINTMENT WITH LAKESHIA KWONG. PATIENT WILL GET THE LABS DONE BEFORE THE NEXT APPOINTMENT ON 25.    PLEASE TEXT OR CALL WHEN ORDERS ARE IN PATIENT WILL GO GET LABS.

## 2025-01-14 ENCOUNTER — LAB (OUTPATIENT)
Dept: LAB | Facility: HOSPITAL | Age: 40
End: 2025-01-14
Payer: COMMERCIAL

## 2025-01-14 DIAGNOSIS — Z00.00 ROUTINE GENERAL MEDICAL EXAMINATION AT A HEALTH CARE FACILITY: ICD-10-CM

## 2025-01-14 LAB
ALBUMIN SERPL-MCNC: 4.4 G/DL (ref 3.5–5.2)
ALBUMIN/GLOB SERPL: 1.2 G/DL
ALP SERPL-CCNC: 72 U/L (ref 39–117)
ALT SERPL W P-5'-P-CCNC: 25 U/L (ref 1–33)
ANION GAP SERPL CALCULATED.3IONS-SCNC: 11.3 MMOL/L (ref 5–15)
AST SERPL-CCNC: 20 U/L (ref 1–32)
BASOPHILS # BLD AUTO: 0.06 10*3/MM3 (ref 0–0.2)
BASOPHILS NFR BLD AUTO: 0.6 % (ref 0–1.5)
BILIRUB SERPL-MCNC: 0.6 MG/DL (ref 0–1.2)
BUN SERPL-MCNC: 13 MG/DL (ref 6–20)
BUN/CREAT SERPL: 16.9 (ref 7–25)
CALCIUM SPEC-SCNC: 9.5 MG/DL (ref 8.6–10.5)
CHLORIDE SERPL-SCNC: 101 MMOL/L (ref 98–107)
CHOLEST SERPL-MCNC: 204 MG/DL (ref 0–200)
CO2 SERPL-SCNC: 26.7 MMOL/L (ref 22–29)
CREAT SERPL-MCNC: 0.77 MG/DL (ref 0.57–1)
DEPRECATED RDW RBC AUTO: 38.8 FL (ref 37–54)
EGFRCR SERPLBLD CKD-EPI 2021: 100.2 ML/MIN/1.73
EOSINOPHIL # BLD AUTO: 0.36 10*3/MM3 (ref 0–0.4)
EOSINOPHIL NFR BLD AUTO: 3.6 % (ref 0.3–6.2)
ERYTHROCYTE [DISTWIDTH] IN BLOOD BY AUTOMATED COUNT: 12.3 % (ref 12.3–15.4)
GLOBULIN UR ELPH-MCNC: 3.6 GM/DL
GLUCOSE SERPL-MCNC: 100 MG/DL (ref 65–99)
HCT VFR BLD AUTO: 40 % (ref 34–46.6)
HDLC SERPL-MCNC: 35 MG/DL (ref 40–60)
HGB BLD-MCNC: 13.3 G/DL (ref 12–15.9)
IMM GRANULOCYTES # BLD AUTO: 0.01 10*3/MM3 (ref 0–0.05)
IMM GRANULOCYTES NFR BLD AUTO: 0.1 % (ref 0–0.5)
LDLC SERPL CALC-MCNC: 140 MG/DL (ref 0–100)
LDLC/HDLC SERPL: 3.93 {RATIO}
LYMPHOCYTES # BLD AUTO: 2.6 10*3/MM3 (ref 0.7–3.1)
LYMPHOCYTES NFR BLD AUTO: 25.9 % (ref 19.6–45.3)
MCH RBC QN AUTO: 28 PG (ref 26.6–33)
MCHC RBC AUTO-ENTMCNC: 33.3 G/DL (ref 31.5–35.7)
MCV RBC AUTO: 84.2 FL (ref 79–97)
MONOCYTES # BLD AUTO: 0.58 10*3/MM3 (ref 0.1–0.9)
MONOCYTES NFR BLD AUTO: 5.8 % (ref 5–12)
NEUTROPHILS NFR BLD AUTO: 6.42 10*3/MM3 (ref 1.7–7)
NEUTROPHILS NFR BLD AUTO: 64 % (ref 42.7–76)
PLATELET # BLD AUTO: 328 10*3/MM3 (ref 140–450)
PMV BLD AUTO: 9.8 FL (ref 6–12)
POTASSIUM SERPL-SCNC: 4 MMOL/L (ref 3.5–5.2)
PROT SERPL-MCNC: 8 G/DL (ref 6–8.5)
RBC # BLD AUTO: 4.75 10*6/MM3 (ref 3.77–5.28)
SODIUM SERPL-SCNC: 139 MMOL/L (ref 136–145)
TRIGL SERPL-MCNC: 157 MG/DL (ref 0–150)
TSH SERPL DL<=0.05 MIU/L-ACNC: 1.92 UIU/ML (ref 0.27–4.2)
VLDLC SERPL-MCNC: 29 MG/DL (ref 5–40)
WBC NRBC COR # BLD AUTO: 10.03 10*3/MM3 (ref 3.4–10.8)

## 2025-01-14 PROCEDURE — 80050 GENERAL HEALTH PANEL: CPT

## 2025-01-14 PROCEDURE — 80061 LIPID PANEL: CPT

## 2025-01-14 PROCEDURE — 36415 COLL VENOUS BLD VENIPUNCTURE: CPT

## 2025-01-16 ENCOUNTER — OFFICE VISIT (OUTPATIENT)
Dept: FAMILY MEDICINE CLINIC | Age: 40
End: 2025-01-16
Payer: COMMERCIAL

## 2025-01-16 VITALS
WEIGHT: 198 LBS | DIASTOLIC BLOOD PRESSURE: 75 MMHG | OXYGEN SATURATION: 99 % | HEIGHT: 66 IN | TEMPERATURE: 98.4 F | SYSTOLIC BLOOD PRESSURE: 137 MMHG | BODY MASS INDEX: 31.82 KG/M2 | HEART RATE: 83 BPM

## 2025-01-16 DIAGNOSIS — M25.50 ARTHRALGIA OF MULTIPLE SITES: ICD-10-CM

## 2025-01-16 DIAGNOSIS — E66.811 CLASS 1 OBESITY DUE TO EXCESS CALORIES WITH SERIOUS COMORBIDITY AND BODY MASS INDEX (BMI) OF 31.0 TO 31.9 IN ADULT: ICD-10-CM

## 2025-01-16 DIAGNOSIS — E66.09 CLASS 1 OBESITY DUE TO EXCESS CALORIES WITH SERIOUS COMORBIDITY AND BODY MASS INDEX (BMI) OF 31.0 TO 31.9 IN ADULT: ICD-10-CM

## 2025-01-16 DIAGNOSIS — R94.31 ABNORMAL ECG: ICD-10-CM

## 2025-01-16 DIAGNOSIS — K21.9 GASTROESOPHAGEAL REFLUX DISEASE, UNSPECIFIED WHETHER ESOPHAGITIS PRESENT: ICD-10-CM

## 2025-01-16 DIAGNOSIS — M67.442 GANGLION CYST OF FLEXOR TENDON SHEATH OF FINGER OF LEFT HAND: ICD-10-CM

## 2025-01-16 DIAGNOSIS — I10 PRIMARY HYPERTENSION: ICD-10-CM

## 2025-01-16 DIAGNOSIS — Z11.59 ENCOUNTER FOR SCREENING FOR VIRAL DISEASE: ICD-10-CM

## 2025-01-16 DIAGNOSIS — E78.2 MIXED HYPERLIPIDEMIA: ICD-10-CM

## 2025-01-16 DIAGNOSIS — R00.2 PALPITATIONS: ICD-10-CM

## 2025-01-16 DIAGNOSIS — Z00.00 ROUTINE GENERAL MEDICAL EXAMINATION AT A HEALTH CARE FACILITY: Primary | ICD-10-CM

## 2025-01-16 PROBLEM — N93.9 ABNORMAL UTERINE BLEEDING: Status: ACTIVE | Noted: 2024-12-03

## 2025-01-16 PROBLEM — R59.1 LYMPHADENOPATHY: Status: RESOLVED | Noted: 2021-02-14 | Resolved: 2025-01-16

## 2025-01-16 PROBLEM — M54.2 CERVICALGIA: Chronic | Status: RESOLVED | Noted: 2021-02-08 | Resolved: 2025-01-16

## 2025-01-16 RX ORDER — AMLODIPINE BESYLATE 5 MG/1
7.5 TABLET ORAL DAILY
Qty: 135 TABLET | Refills: 1 | Status: SHIPPED | OUTPATIENT
Start: 2025-01-16

## 2025-01-16 RX ORDER — ROSUVASTATIN CALCIUM 10 MG/1
10 TABLET, COATED ORAL DAILY
Qty: 90 TABLET | Refills: 1 | Status: SHIPPED | OUTPATIENT
Start: 2025-01-16

## 2025-01-16 NOTE — PROGRESS NOTES
"Chief Complaint  Annual Exam    Subjective          Ayesha Patel presents to [facility] for annual exam. Pt states she is doing well. Had a colonoscopy and EGD on 7/27/23. Normal . Has been to gynecology and has mammogram scheduled. Also has laparoscopic exploratory surgery scheduled with Dr. Manjula Loyd on the 22nd of this month for AUB and pain. Did have a borderline ECG. Reports occasional palpitations.     Occasional GERD. Does not take any medications.     Labs recently drawn. Cholesterol was elevated. 10 year risk score 2.2%.  Patient does have a very strong family history of heart disease.    Patient is taking amlodipine 5 mg and hydrochlorothiazide 37.5 mg daily for hypertension.    She is on meloxicam 15 mg daily for generalized joint pain and fibromyalgia.    Is complaining of a cyst to her left hand.  It is tender when moved a certain way or hit on something.    Review of Systems   Constitutional:  Negative for fatigue.   HENT:  Negative for hearing loss and trouble swallowing.    Respiratory:  Negative for cough and shortness of breath.    Cardiovascular:  Positive for palpitations. Negative for chest pain and leg swelling.   Gastrointestinal:  Negative for constipation, diarrhea, nausea and vomiting.   Genitourinary:  Negative for difficulty urinating.   Musculoskeletal:  Negative for arthralgias and myalgias.   Skin:  Negative for rash.   Neurological:  Negative for headaches.   Psychiatric/Behavioral:  Negative for dysphoric mood, sleep disturbance and suicidal ideas. The patient is not nervous/anxious.         Objective   Vital Signs:   Vitals:    01/16/25 0852   BP: 137/75   Pulse: 83   Temp: 98.4 °F (36.9 °C)   TempSrc: Oral   SpO2: 99%   Weight: 89.8 kg (198 lb)   Height: 167.6 cm (65.98\")       Body mass index is 31.97 kg/m².        Physical Exam  Vitals reviewed.   Constitutional:       General: She is not in acute distress.     Appearance: She is " well-developed. She is obese. She is not diaphoretic.   HENT:      Head: Normocephalic and atraumatic. Hair is normal.      Right Ear: Hearing and external ear normal. No decreased hearing noted. No drainage.      Left Ear: Hearing and external ear normal. No decreased hearing noted.      Nose: Nose normal. No nasal deformity.      Mouth/Throat:      Mouth: Mucous membranes are moist.   Eyes:      General: Lids are normal.         Right eye: No discharge.         Left eye: No discharge.      Extraocular Movements: Extraocular movements intact.      Conjunctiva/sclera: Conjunctivae normal.      Pupils: Pupils are equal, round, and reactive to light.   Neck:      Thyroid: No thyromegaly.   Cardiovascular:      Rate and Rhythm: Normal rate and regular rhythm.      Pulses: Normal pulses.      Heart sounds: Normal heart sounds. No murmur heard.     No friction rub. No gallop.   Pulmonary:      Effort: Pulmonary effort is normal. No respiratory distress.      Breath sounds: Normal breath sounds. No wheezing or rales.   Chest:      Chest wall: No tenderness.   Abdominal:      General: Bowel sounds are normal. There is no distension.      Palpations: Abdomen is soft. There is no mass.      Tenderness: There is no abdominal tenderness. There is no guarding or rebound.      Hernia: No hernia is present.   Musculoskeletal:         General: No tenderness or deformity. Normal range of motion.      Cervical back: Normal range of motion and neck supple.   Lymphadenopathy:      Cervical: No cervical adenopathy.   Skin:     General: Skin is warm and dry.      Findings: No erythema or rash.             Comments: Small, firm, mobile cyst noted to area above   Neurological:      Mental Status: She is alert and oriented to person, place, and time.      Motor: No abnormal muscle tone.      Coordination: Coordination normal.   Psychiatric:         Mood and Affect: Mood normal.         Behavior: Behavior normal.         Thought Content:  Thought content normal.         Judgment: Judgment normal.            Lab Results   Component Value Date    GLUCOSE 100 (H) 01/14/2025    BUN 13 01/14/2025    CREATININE 0.77 01/14/2025    EGFR 100.2 01/14/2025    BCR 16.9 01/14/2025    K 4.0 01/14/2025    CO2 26.7 01/14/2025    CALCIUM 9.5 01/14/2025    ALBUMIN 4.4 01/14/2025    BILITOT 0.6 01/14/2025    AST 20 01/14/2025    ALT 25 01/14/2025     Lab Results   Component Value Date    HGBA1C 5.30 02/08/2021     Lab Results   Component Value Date    WBC 10.03 01/14/2025    HGB 13.3 01/14/2025    HCT 40.0 01/14/2025    MCV 84.2 01/14/2025     01/14/2025     Lab Results   Component Value Date    CHOL 204 (H) 01/14/2025    CHOL 162 11/02/2021    CHLPL 173 11/20/2020     Lab Results   Component Value Date    TRIG 157 (H) 01/14/2025    TRIG 162 (H) 11/02/2021    TRIG 137 11/20/2020     Lab Results   Component Value Date    HDL 35 (L) 01/14/2025    HDL 33 (L) 11/02/2021    HDL 33 (L) 11/20/2020     Lab Results   Component Value Date     (H) 01/14/2025     11/02/2021     (H) 11/20/2020     Lab Results   Component Value Date    TSH 1.920 01/14/2025     Labs reviewed with pt.              Assessment and Plan    Assessment & Plan  Routine general medical examination at a health care facility  Counseled on routine dental and eye exams.  Declines flu and covid vaccines.  Due for mammogram and it has been scheduled.  Up-to-date on routine blood work.      Orders:    Comprehensive Metabolic Panel; Future    Lipid Panel; Future    Class 1 obesity due to excess calories with serious comorbidity and body mass index (BMI) of 31.0 to 31.9 in adult  Patient's (Body mass index is 31.97 kg/m².) indicates that they are obese (BMI >30) with health conditions that include hypertension, dyslipidemias, GERD, and osteoarthritis . Weight is worsening. BMI  is above average; BMI management plan is completed. We discussed portion control and increasing exercise.           Encounter for screening for viral disease  Will notify patient of results and treat accordingly.  Orders:    Hepatitis C Antibody; Future    Primary hypertension  Slightly elevated today.  It was elevated at last gynecology appointment as well.  Increasing amlodipine from 5 mg to 7.5 mg daily continue HCTZ 37.5 mg daily.  Low-sodium diet.    Orders:    Comprehensive Metabolic Panel; Future    Lipid Panel; Future    Arthralgia of multiple sites  Continue meloxicam 15 mg daily.       Mixed hyperlipidemia     Starting Crestor 10 mg daily.  Potential side effects medication discussed.  Heart healthy diet.  Routine exercise and weight loss.    Orders:    rosuvastatin (Crestor) 10 MG tablet; Take 1 tablet by mouth Daily.    Abnormal ECG  Repeat EKG was normal.  Orders:    ECG 12 Lead    Palpitations  Patient to notify office if they began to occur more frequently.  States that she has had a Holter monitor in the past that was normal.       Gastroesophageal reflux disease, unspecified whether esophagitis present  Patient to take over-the-counter Pepcid or Nexium as needed.       Ganglion cyst of flexor tendon sheath of finger of left hand  Referral initiated.  Orders:    Ambulatory Referral to Hand Surgery    Lab orders placed for next routine follow up.     Patient to notify office with any acute concerns or issues.  Patient verbalizes understanding, agrees with plan of care and has no further questions upon discharge.    Please note that portions of this note were completed with a voice recognition program.      Follow Up    Return in about 1 year (around 1/16/2026) for Annual physical.  Patient was given instructions and counseling regarding her condition or for health maintenance advice. Please see specific information pulled into the AVS if appropriate.   Medications Discontinued During This Encounter   Medication Reason    hydrocortisone 2.5 % cream Historical Med - Therapy completed    etonogestrel-ethinyl  estradiol (NUVARING) 0.12-0.015 MG/24HR vaginal ring Historical Med - Therapy completed    amLODIPine (NORVASC) 5 MG tablet Reorder

## 2025-01-16 NOTE — ASSESSMENT & PLAN NOTE
Slightly elevated today.  It was elevated at last gynecology appointment as well.  Increasing amlodipine from 5 mg to 7.5 mg daily continue HCTZ 37.5 mg daily.  Low-sodium diet.    Orders:    Comprehensive Metabolic Panel; Future    Lipid Panel; Future

## 2025-01-16 NOTE — ASSESSMENT & PLAN NOTE
Starting Crestor 10 mg daily.  Potential side effects medication discussed.  Heart healthy diet.  Routine exercise and weight loss.    Orders:    rosuvastatin (Crestor) 10 MG tablet; Take 1 tablet by mouth Daily.

## 2025-01-20 NOTE — PROGRESS NOTES
Procedure     ECG 12 Lead    Date/Time: 1/20/2025 8:27 AM  Performed by: Jaydon Bay MD    Authorized by: Sondra Wade APRN  Previous ECG: no previous ECG available  Rhythm: sinus rhythm  Rate: normal  Conduction: conduction normal  ST Segments: ST segments normal  T Waves: T waves normal  QRS axis: normal  Other findings: early transition    Clinical impression: normal ECG

## 2025-02-06 ENCOUNTER — HOSPITAL ENCOUNTER (OUTPATIENT)
Dept: MAMMOGRAPHY | Facility: HOSPITAL | Age: 40
Discharge: HOME | End: 2025-02-06
Admitting: NURSE PRACTITIONER
Payer: COMMERCIAL

## 2025-02-06 DIAGNOSIS — Z12.31 ENCOUNTER FOR SCREENING MAMMOGRAM FOR BREAST CANCER: ICD-10-CM

## 2025-02-06 PROCEDURE — 77067 SCR MAMMO BI INCL CAD: CPT

## 2025-02-06 PROCEDURE — 77063 BREAST TOMOSYNTHESIS BI: CPT

## 2025-02-07 ENCOUNTER — TELEPHONE (OUTPATIENT)
Dept: FAMILY MEDICINE CLINIC | Age: 40
End: 2025-02-07
Payer: COMMERCIAL

## 2025-02-07 NOTE — TELEPHONE ENCOUNTER
Caller: Ayesha Patel    Relationship: Self    Best call back number: [phone]     What is the medical concern/diagnosis: STOMACH KNOTS    What specialty or service is being requested: GENERAL SURGEON    What is the provider, practice or medical service name: N/A    What is the office location: N/A    What is the office phone number: N/A    Any additional details: PATIENT DOES NOT HAVE A PREFERENCE AS TO WHO SHE IS REFERRED TO

## 2025-02-13 ENCOUNTER — OFFICE VISIT (OUTPATIENT)
Dept: FAMILY MEDICINE CLINIC | Age: 40
End: 2025-02-13
Payer: COMMERCIAL

## 2025-02-13 ENCOUNTER — LAB (OUTPATIENT)
Dept: LAB | Facility: HOSPITAL | Age: 40
End: 2025-02-13
Payer: COMMERCIAL

## 2025-02-13 VITALS
BODY MASS INDEX: 31.6 KG/M2 | WEIGHT: 196.6 LBS | HEART RATE: 80 BPM | TEMPERATURE: 98.2 F | OXYGEN SATURATION: 93 % | HEIGHT: 66 IN | SYSTOLIC BLOOD PRESSURE: 134 MMHG | DIASTOLIC BLOOD PRESSURE: 76 MMHG

## 2025-02-13 DIAGNOSIS — R10.32 LEFT LOWER QUADRANT ABDOMINAL PAIN: ICD-10-CM

## 2025-02-13 DIAGNOSIS — I10 PRIMARY HYPERTENSION: ICD-10-CM

## 2025-02-13 DIAGNOSIS — R10.32 LEFT LOWER QUADRANT ABDOMINAL PAIN: Primary | ICD-10-CM

## 2025-02-13 LAB
ALBUMIN SERPL-MCNC: 4.8 G/DL (ref 3.5–5.2)
ALBUMIN/GLOB SERPL: 1.5 G/DL
ALP SERPL-CCNC: 72 U/L (ref 39–117)
ALT SERPL W P-5'-P-CCNC: 34 U/L (ref 1–33)
ANION GAP SERPL CALCULATED.3IONS-SCNC: 10.7 MMOL/L (ref 5–15)
AST SERPL-CCNC: 26 U/L (ref 1–32)
BILIRUB SERPL-MCNC: 0.5 MG/DL (ref 0–1.2)
BUN SERPL-MCNC: 13 MG/DL (ref 6–20)
BUN/CREAT SERPL: 17.1 (ref 7–25)
CALCIUM SPEC-SCNC: 9.8 MG/DL (ref 8.6–10.5)
CHLORIDE SERPL-SCNC: 101 MMOL/L (ref 98–107)
CO2 SERPL-SCNC: 28.3 MMOL/L (ref 22–29)
CREAT SERPL-MCNC: 0.76 MG/DL (ref 0.57–1)
EGFRCR SERPLBLD CKD-EPI 2021: 101.7 ML/MIN/1.73
GLOBULIN UR ELPH-MCNC: 3.3 GM/DL
GLUCOSE SERPL-MCNC: 101 MG/DL (ref 65–99)
POTASSIUM SERPL-SCNC: 3.4 MMOL/L (ref 3.5–5.2)
PROT SERPL-MCNC: 8.1 G/DL (ref 6–8.5)
SODIUM SERPL-SCNC: 140 MMOL/L (ref 136–145)

## 2025-02-13 PROCEDURE — 86003 ALLG SPEC IGE CRUDE XTRC EA: CPT

## 2025-02-13 PROCEDURE — 82784 ASSAY IGA/IGD/IGG/IGM EACH: CPT

## 2025-02-13 PROCEDURE — 99213 OFFICE O/P EST LOW 20 MIN: CPT | Performed by: NURSE PRACTITIONER

## 2025-02-13 PROCEDURE — 86258 DGP ANTIBODY EACH IG CLASS: CPT

## 2025-02-13 PROCEDURE — 80053 COMPREHEN METABOLIC PANEL: CPT

## 2025-02-13 PROCEDURE — 86364 TISS TRNSGLTMNASE EA IG CLAS: CPT

## 2025-02-13 PROCEDURE — 86008 ALLG SPEC IGE RECOMB EA: CPT

## 2025-02-13 PROCEDURE — 36415 COLL VENOUS BLD VENIPUNCTURE: CPT

## 2025-02-13 PROCEDURE — 82785 ASSAY OF IGE: CPT

## 2025-02-13 NOTE — PROGRESS NOTES
"Chief Complaint  Nodules on abdomen    Subjective          Ayesha Patel presents to [facility] for an acute visit. She is c/o continued discomfort of abd nodules that are intermittent in nature.  Is not influenced by foods that she has noticed.  Patient has had general surgery physically able to palpate these during exam and ultrasound guided biopsy was attempted but there was not anything visualized.  Patient recently had diagnostic laparoscopy, left ovarian cystoscopy and left ovarian biopsy, fulguration of endometriosis, hysteroscopy, D&C and polypectomy with Dr. Manjula Loyd at [facility].  Pathology was benign and she was encouraged to return back to PCP to a new General surgeon for further evaluation.       Objective   Vital Signs:   Vitals:    02/13/25 0826   BP: 134/76   BP Location: Right arm   Patient Position: Sitting   Pulse: 80   Temp: 98.2 °F (36.8 °C)   TempSrc: Oral   SpO2: 93%   Weight: 89.2 kg (196 lb 9.6 oz)   Height: 167.6 cm (65.98\")       Body mass index is 31.75 kg/m².          Physical Exam  Vitals reviewed.   Constitutional:       General: She is not in acute distress.     Appearance: Normal appearance. She is well-developed. She is not ill-appearing.   HENT:      Head: Normocephalic and atraumatic.   Eyes:      Conjunctiva/sclera: Conjunctivae normal.      Pupils: Pupils are equal, round, and reactive to light.   Cardiovascular:      Rate and Rhythm: Normal rate.   Pulmonary:      Effort: Pulmonary effort is normal. No respiratory distress.   Abdominal:      Palpations: Abdomen is soft.      Tenderness: There is abdominal tenderness.      Comments: Cystic texture to upper abdominal wall.  Mild tenderness noted to left lower quadrant   Skin:     General: Skin is warm and dry.   Neurological:      Mental Status: She is alert and oriented to person, place, and time.   Psychiatric:         Mood and Affect: Mood and affect normal.         Behavior: " Behavior normal.         Thought Content: Thought content normal.         Judgment: Judgment normal.                     Assessment and Plan    Assessment & Plan  Left lower quadrant abdominal pain  Referral initiated.  Will notify patient of results and treat accordingly.  Go to ER with severe pain.  Orders:    Alpha-Gal IgE Panel; Future    Food Allergy Profile; Future    Celiac Panel Reflex To Titer; Future    Ambulatory Referral to General Surgery      Patient to notify office with any acute concerns or issues.  Patient verbalizes understanding, agrees with plan of care and has no further questions upon discharge.    Please note that portions of this note were completed with a voice recognition program.      Follow Up    Return if symptoms worsen or fail to improve.  Patient was given instructions and counseling regarding her condition or for health maintenance advice. Please see specific information pulled into the AVS if appropriate.     There are no discontinued medications.

## 2025-02-14 LAB
GLIADIN PEPTIDE IGA SER-ACNC: 4 UNITS (ref 0–19)
IGA SERPL-MCNC: 377 MG/DL (ref 87–352)
TTG IGA SER-ACNC: <2 U/ML (ref 0–3)

## 2025-02-17 LAB
ALPHA-GAL IGE QN: 0.58 KU/L
BEEF IGE QN: 0.34 KU/L
CLAM IGE QN: 0.2 KU/L
CODFISH IGE QN: <0.1 KU/L
CONV CLASS DESCRIPTION: ABNORMAL
CONV CLASS DESCRIPTION: ABNORMAL
CORN IGE QN: <0.1 KU/L
COW MILK IGE QN: <0.1 KU/L
EGG WHITE IGE QN: <0.1 KU/L
IGE SERPL-ACNC: 260 IU/ML (ref 6–495)
LAMB IGE QN: <0.1 KU/L
PEANUT IGE QN: <0.1 KU/L
PORK IGE QN: 0.1 KU/L
SCALLOP IGE QN: 0.12 KU/L
SESAME SEED IGE QN: <0.1 KU/L
SHRIMP IGE QN: 5.47 KU/L
SOYBEAN IGE QN: <0.1 KU/L
WALNUT IGE QN: <0.1 KU/L
WHEAT IGE QN: <0.1 KU/L

## 2025-02-18 ENCOUNTER — PATIENT MESSAGE (OUTPATIENT)
Dept: FAMILY MEDICINE CLINIC | Age: 40
End: 2025-02-18
Payer: COMMERCIAL

## 2025-02-18 RX ORDER — EPINEPHRINE 0.3 MG/.3ML
0.3 INJECTION SUBCUTANEOUS ONCE
Qty: 1 EACH | Refills: 0 | Status: SHIPPED | OUTPATIENT
Start: 2025-02-18 | End: 2025-02-18

## 2025-03-04 DIAGNOSIS — I10 BENIGN ESSENTIAL HYPERTENSION: ICD-10-CM

## 2025-03-04 RX ORDER — HYDROCHLOROTHIAZIDE 25 MG/1
37.5 TABLET ORAL DAILY
Qty: 135 TABLET | Refills: 3 | Status: SHIPPED | OUTPATIENT
Start: 2025-03-04

## 2025-03-04 NOTE — TELEPHONE ENCOUNTER
Rx Refill Note  Requested Prescriptions     Pending Prescriptions Disp Refills    hydroCHLOROthiazide 25 MG tablet [Pharmacy Med Name: hydroCHLOROthiazide 25 MG Oral Tablet] 135 tablet 3     Sig: TAKE 1.5 TABLETS BY MOUTH DAILY.      Last office visit with prescribing clinician: 1/16/25    Next office visit with prescribing clinician: 5/19/2025    hydroCHLOROthiazide (HYDRODIURIL) 25 MG tablet (01/11/2024)     Dorothy Arcos LPN  03/04/25, 11:07 EST

## 2025-03-10 DIAGNOSIS — Z91.013 SHELLFISH ALLERGY: Primary | ICD-10-CM

## 2025-03-10 DIAGNOSIS — Z91.018 ALLERGY TO ALPHA-GAL: ICD-10-CM

## 2025-03-13 ENCOUNTER — OFFICE VISIT (OUTPATIENT)
Dept: SURGERY | Facility: CLINIC | Age: 40
End: 2025-03-13
Payer: COMMERCIAL

## 2025-03-13 VITALS
BODY MASS INDEX: 31.5 KG/M2 | HEIGHT: 66 IN | SYSTOLIC BLOOD PRESSURE: 146 MMHG | OXYGEN SATURATION: 100 % | HEART RATE: 79 BPM | WEIGHT: 196 LBS | DIASTOLIC BLOOD PRESSURE: 88 MMHG

## 2025-03-13 DIAGNOSIS — R22.2 NODULE OF SKIN OF ABDOMEN: Primary | ICD-10-CM

## 2025-03-13 RX ORDER — EPINEPHRINE 0.3 MG/.3ML
INJECTION SUBCUTANEOUS
COMMUNITY
Start: 2025-03-05

## 2025-03-13 NOTE — PROGRESS NOTES
Chief Complaint: Abdominal Pain    Subjective      I have knots on my abdomen       History of Present Illness  Ayesha Taveras is a 39 y.o. female presents to [facility] for painful knots on abdomen.     Patient presents today on referral from Sondra Figueroa for abdominal knots that are painful.  Patient reports that she has noticed these nodules, since having COVID in .  She reports after she got COVID in  she was having some deep muscle pain that ran down her arms and across to her chest.  She reports that using a heating pad and hot baths help with her symptoms.    Patient underwent an EGD and colonoscopy in 2023, that revealed chronic gastritis and a colonoscopy was normal.  She was recently diagnosed with alpha gal 3 weeks ago and has allergies to food.  Patient has underwent multiple CT scans for lymphadenopathy that were negative.    Patient reports he is nodules on the sides of her abdomen make it difficult for her to sleep on her sides.  She reports the pain awakens her at night.  CBC is within normal limits.    :Narrative    REVIEWING YOUR TEST RESULTS IN [facility] IS NOT A SUBSTITUTE FOR DISCUSSING THOSE RESULTS WITH YOUR HEALTH CARE PROVIDER.  PLEASE CONTACT YOUR PROVIDER VIA [facility] TO DISCUSS ANY QUESTIONS OR CONCERNS YOU MAY HAVE REGARDING THESE TEST RESULTS.    RADIOLOGY REPORT    FACILITY:  [facility]  UNIT/AGE/GENDER: M.CT  OP      AGE:36 Y          SEX:F  PATIENT NAME/:  AYESHA TAVERAS ANN    1985  UNIT NUMBER:  [id number]  ACCOUNT NUMBER:  [id number]  ACCESSION NUMBER:  [id number]    EXAMINATION: CT CHEST, CT ABDOMEN AND CT PELVIS W IV CON    DATE: 2022    HISTORY: lymphadenopathy    COMPARISON: 2021    TECHNIQUE: A routine CT of the chest, abdomen, and pelvis was performed with contrast using axial slices from the thoracic inlet through the pubic symphysis. CT  scans at this facility use dose modulation, iterative reconstruction and/or weight based  dosing when appropriate to reduce radiation dose to as low as reasonably achievable.    FINDINGS:    CHEST: Evaluation of lung windows demonstrates no pneumothorax. A calcified granuloma is present within the right upper lobe. There is mild linear atelectasis at the lung bases. There is no consolidation.    Mediastinal windows demonstrate no pleural or pericardial effusion. The esophagus is normal. Less than 1 cm short axis lymph nodes are present within the mediastinum. The thyroid is unremarkable. There is no significant atherosclerotic disease.    ABDOMEN: There is no perihepatic ascites or intrahepatic biliary dilation. There is mild diffuse hepatic steatosis. The spleen, adrenals, gall bladder, and pancreas are normal. The kidneys enhance symmetrically without hydronephrosis. There are no  obstructing stones.    The stomach, duodenum, and small bowel are normal in appearance. There is moderate contrast mixed with stool throughout the colon without obstruction. The appendix is present and is unremarkable. There is no evidence of colonic inflammation.    Pelvis: There is no free air or free fluid. The urinary bladder, uterus, adnexa are within normal limits. There is no pathologic mediastinal or retroperitoneal lymphadenopathy. There are no suspicious lytic or sclerotic osseous lesions.    IMPRESSION:  1. No suspicious lymphadenopathy within the chest, abdomen, or pelvis.    Dictated by: Alvarez Cavazos M.D.   Objective     Past Medical History:   Diagnosis Date    Allergy to alpha-gal     Hypertension     Shellfish allergy     Thyroid cyst        History reviewed. No pertinent surgical history.    Outpatient Medications Marked as Taking for the 3/13/25 encounter (Office Visit) with Tres April, APRN   Medication Sig Dispense Refill    amLODIPine (NORVASC) 5 MG tablet Take 1.5 tablets by mouth Daily. 135 tablet 1     "EPINEPHrine (EPIPEN) 0.3 MG/0.3ML solution auto-injector injection       hydroCHLOROthiazide 25 MG tablet TAKE 1.5 TABLETS BY MOUTH DAILY. 135 tablet 3    meloxicam (Mobic) 15 MG tablet Take 1 tablet by mouth Daily. 90 tablet 1    rosuvastatin (Crestor) 10 MG tablet Take 1 tablet by mouth Daily. 90 tablet 1       Allergies   Allergen Reactions    Lisinopril GI Intolerance     \"flipped her intestines inside out and hospitalized her\"        Family History   Problem Relation Age of Onset    Hypertension Mother     Heart disease Maternal Uncle     Heart disease Maternal Grandmother     Prostate cancer Paternal Grandfather         prostate    Hyperlipidemia Father     Prostate cancer Paternal Uncle         several P.Uncles    Rheum arthritis Paternal Aunt         and giant cell arteritis    Prostate cancer Paternal Uncle        Social History     Socioeconomic History    Marital status:    Tobacco Use    Smoking status: Never     Passive exposure: Never    Smokeless tobacco: Never   Vaping Use    Vaping status: Never Used   Substance and Sexual Activity    Alcohol use: Never    Drug use: Never    Sexual activity: Yes     Partners: Male     Comment:  had vasectomy       Review of Systems   Constitutional:  Negative for chills and fever.   Gastrointestinal:  Positive for abdominal pain. Negative for abdominal distention.   Skin:  Positive for skin lesions.        Vital Signs:   /88 (BP Location: Right arm, Patient Position: Sitting, Cuff Size: Large Adult)   Pulse 79   Ht 167.6 cm (65.98\")   Wt 88.9 kg (196 lb)   SpO2 100%   BMI 31.65 kg/m²      Physical Exam  Vitals and nursing note reviewed.   Constitutional:       General: She is not in acute distress.     Appearance: She is obese. She is not ill-appearing.   HENT:      Head: Normocephalic and atraumatic.   Cardiovascular:      Rate and Rhythm: Normal rate.   Pulmonary:      Effort: Pulmonary effort is normal.      Breath sounds: No stridor. "   Abdominal:      Tenderness: There is no guarding.      Comments: Patient is with multiple nodules along both sides of her abdomen.  Upon assessment feels like lipomas.   Neurological:      Mental Status: She is alert.          Result Review :          []  Laboratory  []  Radiology  []  Pathology  []  Microbiology  []  EKG/Telemetry   []  Cardiology/Vascular   []  Old records  I spent 30 minutes caring for Ayesha on this date of service. This time includes time spent by me in the following activities: reviewing tests, obtaining and/or reviewing a separately obtained history, performing a medically appropriate examination and/or evaluation, ordering medications, tests, or procedures, and documenting information in the medical record        Assessment and Plan    Diagnoses and all orders for this visit:    1. Nodule of skin of abdomen (Primary)        Follow Up   Return for Follow-up with Dr. Munson to discuss case .    Today I did discuss with the patient that I feel like these nodules are lipomas, that are totally benign nodules in nature.  Patient prefers to have a biopsy or have them removed.    Seek medical emergency services:  Fever greater than 101 associated with chills.  Extreme pain.  Patient to call the office, 911, or go to the ER with new or worsening symptoms.    Patient was given instructions and counseling regarding her condition or for health maintenance advice. Please see specific information pulled into the AVS if appropriate.     As always, it has been a pleasure to participate in your patient's care. Please call with questions or concerns.

## 2025-03-20 ENCOUNTER — PREP FOR SURGERY (OUTPATIENT)
Dept: OTHER | Facility: HOSPITAL | Age: 40
End: 2025-03-20
Payer: COMMERCIAL

## 2025-03-20 ENCOUNTER — OFFICE VISIT (OUTPATIENT)
Dept: SURGERY | Facility: CLINIC | Age: 40
End: 2025-03-20
Payer: COMMERCIAL

## 2025-03-20 VITALS — RESPIRATION RATE: 16 BRPM | BODY MASS INDEX: 33.46 KG/M2 | WEIGHT: 196 LBS | HEIGHT: 64 IN

## 2025-03-20 DIAGNOSIS — D17.1 LIPOMA OF ABDOMINAL WALL: Primary | ICD-10-CM

## 2025-03-20 RX ORDER — SODIUM CHLORIDE 0.9 % (FLUSH) 0.9 %
10 SYRINGE (ML) INJECTION AS NEEDED
OUTPATIENT
Start: 2025-03-20

## 2025-03-20 RX ORDER — SODIUM CHLORIDE 0.9 % (FLUSH) 0.9 %
10 SYRINGE (ML) INJECTION EVERY 12 HOURS SCHEDULED
OUTPATIENT
Start: 2025-03-20

## 2025-03-20 RX ORDER — MEDROXYPROGESTERONE ACETATE 10 MG
10 TABLET ORAL DAILY
COMMUNITY
Start: 2025-03-14 | End: 2025-03-24

## 2025-03-20 RX ORDER — SODIUM CHLORIDE 9 MG/ML
40 INJECTION, SOLUTION INTRAVENOUS AS NEEDED
OUTPATIENT
Start: 2025-03-20

## 2025-03-21 NOTE — PROGRESS NOTES
Chief Complaint: Follow-up (Nodule of skin of abdomen )    Subjective       History of Present Illness    Ayesha Patel is a 39 y.o. female presents to [facility] GENERAL SURGERY   History of Present Illness  The patient presents for evaluation of abdominal masses.    She reports the presence of palpable masses in her abdomen and one on the posterior aspect of her leg. These masses have been persistent since 2020, following a mild COVID-19 infection. She has been experiencing intermittent deep aching pain in various locations including her forearms, legs, and chest. The pain is transient, resolving spontaneously. She has sought relief through the use of heating pads and hot baths. Despite consultations with two oncologists and undergoing two to three CT scans and two mammograms, the cause of her symptoms remains undetermined. The masses are associated with pain, which is intermittent, but the masses themselves are persistent. She experiences symptom-free periods, particularly during the summer. A previous physician suggested a possible diagnosis of fibromyalgia. An ultrasound-guided biopsy of one of the abdominal masses was performed at [facility], but no abnormalities were detected. The radiologist suggested the possibility of lipomas. She also underwent endometriosis surgery due to concerns about potential ovarian issues. Biopsies of a polyp and tissue were negative. The largest mass is located in her abdomen, causing significant discomfort. She expresses a preference for surgical removal of the masses under sedation, citing a previous unpleasant experience with local anesthesia during a cyst removal procedure.  Objective     Past Medical History:   Diagnosis Date    Allergy to alpha-gal     Hypertension     Shellfish allergy     Thyroid cyst        History reviewed. No pertinent surgical history.      Current Outpatient Medications:     amLODIPine (NORVASC) 5 MG tablet, Take 1.5 tablets by  "mouth Daily., Disp: 135 tablet, Rfl: 1    EPINEPHrine (EPIPEN) 0.3 MG/0.3ML solution auto-injector injection, , Disp: , Rfl:     hydroCHLOROthiazide 25 MG tablet, TAKE 1.5 TABLETS BY MOUTH DAILY., Disp: 135 tablet, Rfl: 3    medroxyPROGESTERone (PROVERA) 10 MG tablet, Take 1 tablet by mouth Daily., Disp: , Rfl:     meloxicam (Mobic) 15 MG tablet, Take 1 tablet by mouth Daily., Disp: 90 tablet, Rfl: 1    rosuvastatin (Crestor) 10 MG tablet, Take 1 tablet by mouth Daily., Disp: 90 tablet, Rfl: 1    nystatin (MYCOSTATIN) 654710 UNIT/GM cream, Apply 1 Application topically to the appropriate area as directed 2 (Two) Times a Day. (Patient not taking: Reported on 3/20/2025), Disp: 30 g, Rfl: 1    nystatin (MYCOSTATIN) 487810 UNIT/GM powder, Apply  topically to the appropriate area as directed 3 (Three) Times a Day. (Patient not taking: Reported on 3/20/2025), Disp: 60 g, Rfl: 5    Allergies   Allergen Reactions    Lisinopril GI Intolerance     \"flipped her intestines inside out and hospitalized her\"        Family History   Problem Relation Age of Onset    Hypertension Mother     Heart disease Maternal Uncle     Heart disease Maternal Grandmother     Prostate cancer Paternal Grandfather         prostate    Hyperlipidemia Father     Prostate cancer Paternal Uncle         several P.Uncles    Rheum arthritis Paternal Aunt         and giant cell arteritis    Prostate cancer Paternal Uncle        Social History     Socioeconomic History    Marital status:    Tobacco Use    Smoking status: Never     Passive exposure: Never    Smokeless tobacco: Never   Vaping Use    Vaping status: Never Used   Substance and Sexual Activity    Alcohol use: Never    Drug use: Never    Sexual activity: Yes     Partners: Male     Comment:  had vasectomy       Vital Signs:   Resp 16   Ht 162.6 cm (64\")   Wt 88.9 kg (196 lb)   BMI 33.64 kg/m²      Physical Exam   Physical Exam  The patient is in no acute distress.  Breathing is " nonlabored.  The abdomen is soft. There are a couple of spots on the abdomen that are masses, one is about 2 cm and the other is about 1.5 cm on the left and right side of the abdomen.      Result Review :         Procedures   Results  Imaging  Ultrasound-guided biopsy of one of the bumps on the abdomen showed no abnormalities.           Assessment and Plan   Diagnoses and all orders for this visit:    1. Lipoma of abdominal wall (Primary)          Assessment & Plan  1. Symptomatic abdominal masses, likely lipomas.  The patient presents with multiple abdominal masses that are likely lipomas. These masses are benign fatty tumors that can cause discomfort and pain. An excision of the masses in the operating room is planned. The risks, benefits, and alternatives of the procedure were discussed extensively. Risks include bleeding and infection, although major complications are not anticipated. The patient voiced understanding and agreed to proceed with the plan. The excised tissue will be sent for pathology to confirm the diagnosis.    PROCEDURE  The patient underwent endometriosis surgery with biopsies of a polyp and tissue, which were negative.       Follow Up   No follow-ups on file.  Patient was given instructions and counseling regarding her condition or for health maintenance advice. Please see specific information pulled into the AVS if appropriate.     Discussed with the patient - all questions were answered they voiced understanding and agreed to proceed with above plan    Patient or patient representative verbalized consent for the use of Ambient Listening during the visit with  Jero Munson MD for chart documentation. 3/21/2025  13:45 EDT    Jero Munson MD

## 2025-04-02 ENCOUNTER — TELEPHONE (OUTPATIENT)
Dept: FAMILY MEDICINE CLINIC | Age: 40
End: 2025-04-02
Payer: COMMERCIAL

## 2025-04-02 RX ORDER — IBUPROFEN 800 MG/1
800 TABLET, FILM COATED ORAL EVERY 8 HOURS PRN
COMMUNITY
End: 2025-04-02 | Stop reason: SDUPTHER

## 2025-04-02 RX ORDER — IBUPROFEN 800 MG/1
800 TABLET, FILM COATED ORAL EVERY 8 HOURS PRN
Qty: 30 TABLET | Refills: 0 | Status: SHIPPED | OUTPATIENT
Start: 2025-04-02

## 2025-04-02 NOTE — TELEPHONE ENCOUNTER
Caller: Ayesha Patel    Relationship: Self    Best call back number: [phone]     What medication are you requesting: IBUPROFEN - 800 MG     Have you had these symptoms before:    [x] Yes  [] No    Have you been treated for these symptoms before:   [x] Yes  [] No    If a prescription is needed, what is your preferred pharmacy and phone number: [facility] - [place], [place] - [address] [place] [address] [phone] [facility] [phone]      Additional notes: PATIENT CALLED IN REQUESTING A REFILL ON THE MEDICATION ABOVE. PATIENT STATES SHE IS UNSURE IF LAKESHIA KWONG HAS REFILLED THIS FOR HER BEFORE, BUT PATIENT STATES SHE HAS HAD THIS PRESCRIBED FOR ABOUT 4-6 MONTHS OFF AND ON, PLEASE ADVISE

## 2025-04-02 NOTE — TELEPHONE ENCOUNTER
Spoke to Patient and she says the meloxicam does not work as well.  Med list update.  Please send rx.

## 2025-04-02 NOTE — TELEPHONE ENCOUNTER
If she is on meloxicam, she does not need to take this as well. Will send if not on meloxicam. 800mg q8prn #30, 0 refills. vaishnavi Levy

## 2025-04-25 NOTE — PRE-PROCEDURE INSTRUCTIONS
PATIENT INSTRUCTED TO BE:    - NOTHING TO EAT AFTER MIDNIGHT OR CHEW, EXCEPT CAN HAVE SIPS OF WATER WITH MEDICATIONS   - TO HOLD ALL VITAMINS, SUPPLEMENTS, NSAIDS FOR ONE WEEK PRIOR TO THEIR SURGICAL PROCEDURE    - DO NOT TAKE _____NA_________________ 7 DAYS PRIOR TO PROCEDURE PER ANESTHESIA RECOMMENDATIONS/INSTRUCTIONS     - INSTRUCTED PT TO USE SURGICAL SOAP 1 TIME THE NIGHT PRIOR TO SURGERY __9-79-70_________ OR THE AM OF SURGERY _4-85-54____________   USE THE SOAP FROM NECK TO TOES, AVOID THEIR FACE, HAIR, AND PRIVATE PARTS. IF USE THE SOAP THE NIGHT PRIOR TO SURGERY, CHANGE BED LINENS AND NO PETS IN THE BED.     INSTRUCTED NO LOTIONS, JEWELRY, PIERCINGS,  NAIL POLISH, OR DEODORANT DAY OF SURGERY    - IF DIABETIC, CHECK BLOOD GLUCOSE IF LESS THAN 70 OR HAVING SYMPTOMS CALL THE PREOP AREA FOR INSTRUCTIONS ON AM OF SURGERY ([phone] )    -INSTRUCTED TO TAKE THE FOLLOWING MEDICATIONS THE DAY OF SURGERY WITH SIPS OF WATER:      NORVASC, LOESTRIN, CRESTOR         - DO NOT BRING ANY MEDICATIONS WITH YOU TO THE HOSPITAL THE DAY OF SURGERY, EXCEPT IF USE INHALERS. BRING INHALERS DAY OF SURGERY       - BRING CPAP OR BIPAP TO THE HOSPITAL ONLY IF YOU ARE SPENDING THE NIGHT    - DO NOT SMOKE OR VAPE 24 HOURS PRIOR TO PROCEDURE PER ANESTHESIA REQUEST     -MAKE SURE YOU HAVE A RIDE HOME OR SOMEONE TO STAY WITH YOU THE DAY OF THE PROCEDURE AFTER YOU GO HOME     - FOLLOW ANY OTHER INSTRUCTIONS GIVEN TO YOU BY YOUR SURGEON'S OFFICE.     - DAY OF SURGERY _3-69-62___________,[facility] ( [address]), YOU CAN  PARK OR SELF PARK. ENTER THE PAVILION THRU MAIN ENTRANCE, TAKE ELEVATORS TO THE FIRST FLOOR, CHECK IN AT THE DESK FOR REGISTRATION/ SURGERY.     - YOU WILL RECEIVE A PHONE CALL THE DAY PRIOR TO SURGERY BETWEEN 1PM AND 4 PM WITH ARRIVAL TIME, IF YOUR SURGERY IS ON A MONDAY YOU WILL RECEIVE A CALL THE FRIDAY PRIOR TO SURGERY DATE    - BRING CASH OR CREDIT CARD FOR COPAYMENT OF  MEDICATIONS AFTER SURGERY IF YOU USE THE HOSPITAL PHARMACY (MEDS TO BED)    - PREADMISSION TESTING NURSE STEPHANIE RUBY -[phone] IF HAVE ANY QUESTIONS     -PATIENT PROVIDED THE NUMBER FOR PREOP SURGICAL DEPT IF HAD QUESTIONS AFTER HOURS PRIOR TO SURGERY ([phone] ).  INFORMED PT IF NO ANSWER, LEAVE A MESSAGE AND SOMEONE WILL RETURN THEIR CALL       PATIENT VERBALIZED UNDERSTANDING

## 2025-04-28 ENCOUNTER — ANESTHESIA (OUTPATIENT)
Dept: PERIOP | Facility: HOSPITAL | Age: 40
End: 2025-04-28
Payer: COMMERCIAL

## 2025-04-28 ENCOUNTER — HOSPITAL ENCOUNTER (OUTPATIENT)
Facility: HOSPITAL | Age: 40
Setting detail: HOSPITAL OUTPATIENT SURGERY
Discharge: HOME | End: 2025-04-28
Attending: SURGERY | Admitting: SURGERY
Payer: COMMERCIAL

## 2025-04-28 ENCOUNTER — ANESTHESIA EVENT (OUTPATIENT)
Dept: PERIOP | Facility: HOSPITAL | Age: 40
End: 2025-04-28
Payer: COMMERCIAL

## 2025-04-28 VITALS
SYSTOLIC BLOOD PRESSURE: 135 MMHG | OXYGEN SATURATION: 97 % | HEIGHT: 64 IN | BODY MASS INDEX: 33.8 KG/M2 | RESPIRATION RATE: 14 BRPM | HEART RATE: 67 BPM | DIASTOLIC BLOOD PRESSURE: 76 MMHG | WEIGHT: 197.97 LBS | TEMPERATURE: 98.8 F

## 2025-04-28 DIAGNOSIS — D17.1 LIPOMA OF ABDOMINAL WALL: ICD-10-CM

## 2025-04-28 LAB — B-HCG UR QL: NEGATIVE

## 2025-04-28 PROCEDURE — 27618 EXC LEG/ANKLE TUM < 3 CM: CPT | Performed by: SURGERY

## 2025-04-28 PROCEDURE — 25010000002 MIDAZOLAM PER 1MG: Performed by: ANESTHESIOLOGY

## 2025-04-28 PROCEDURE — 25010000002 DEXAMETHASONE PER 1 MG

## 2025-04-28 PROCEDURE — 25810000003 LACTATED RINGERS PER 1000 ML: Performed by: ANESTHESIOLOGY

## 2025-04-28 PROCEDURE — 25010000002 LIDOCAINE PF 2% 2 % SOLUTION

## 2025-04-28 PROCEDURE — 22903 EXC ABD LES SC 3 CM/>: CPT | Performed by: SPECIALIST/TECHNOLOGIST, OTHER

## 2025-04-28 PROCEDURE — 25010000002 FENTANYL CITRATE (PF) 50 MCG/ML SOLUTION

## 2025-04-28 PROCEDURE — 25010000002 PROPOFOL 10 MG/ML EMULSION

## 2025-04-28 PROCEDURE — 81025 URINE PREGNANCY TEST: CPT | Performed by: ANESTHESIOLOGY

## 2025-04-28 PROCEDURE — 25010000002 LIDOCAINE 1 % SOLUTION: Performed by: SURGERY

## 2025-04-28 PROCEDURE — 25010000002 ONDANSETRON PER 1 MG

## 2025-04-28 PROCEDURE — 22903 EXC ABD LES SC 3 CM/>: CPT | Performed by: SURGERY

## 2025-04-28 PROCEDURE — 25010000002 KETOROLAC TROMETHAMINE PER 15 MG

## 2025-04-28 PROCEDURE — 88304 TISSUE EXAM BY PATHOLOGIST: CPT | Performed by: SURGERY

## 2025-04-28 PROCEDURE — 25010000002 CEFAZOLIN PER 500 MG: Performed by: SURGERY

## 2025-04-28 RX ORDER — DEXAMETHASONE SODIUM PHOSPHATE 4 MG/ML
INJECTION, SOLUTION INTRA-ARTICULAR; INTRALESIONAL; INTRAMUSCULAR; INTRAVENOUS; SOFT TISSUE AS NEEDED
Status: DISCONTINUED | OUTPATIENT
Start: 2025-04-28 | End: 2025-04-28 | Stop reason: SURG

## 2025-04-28 RX ORDER — LIDOCAINE HYDROCHLORIDE 20 MG/ML
INJECTION, SOLUTION EPIDURAL; INFILTRATION; INTRACAUDAL; PERINEURAL AS NEEDED
Status: DISCONTINUED | OUTPATIENT
Start: 2025-04-28 | End: 2025-04-28 | Stop reason: SURG

## 2025-04-28 RX ORDER — PROMETHAZINE HYDROCHLORIDE 25 MG/1
25 SUPPOSITORY RECTAL ONCE AS NEEDED
Status: DISCONTINUED | OUTPATIENT
Start: 2025-04-28 | End: 2025-04-28 | Stop reason: HOSPADM

## 2025-04-28 RX ORDER — ONDANSETRON 2 MG/ML
4 INJECTION INTRAMUSCULAR; INTRAVENOUS ONCE AS NEEDED
Status: DISCONTINUED | OUTPATIENT
Start: 2025-04-28 | End: 2025-04-28 | Stop reason: HOSPADM

## 2025-04-28 RX ORDER — SODIUM CHLORIDE, SODIUM LACTATE, POTASSIUM CHLORIDE, CALCIUM CHLORIDE 600; 310; 30; 20 MG/100ML; MG/100ML; MG/100ML; MG/100ML
9 INJECTION, SOLUTION INTRAVENOUS CONTINUOUS PRN
Status: DISCONTINUED | OUTPATIENT
Start: 2025-04-28 | End: 2025-04-28 | Stop reason: HOSPADM

## 2025-04-28 RX ORDER — ONDANSETRON 2 MG/ML
INJECTION INTRAMUSCULAR; INTRAVENOUS AS NEEDED
Status: DISCONTINUED | OUTPATIENT
Start: 2025-04-28 | End: 2025-04-28 | Stop reason: SURG

## 2025-04-28 RX ORDER — SODIUM CHLORIDE 0.9 % (FLUSH) 0.9 %
10 SYRINGE (ML) INJECTION EVERY 12 HOURS SCHEDULED
Status: DISCONTINUED | OUTPATIENT
Start: 2025-04-28 | End: 2025-04-28 | Stop reason: HOSPADM

## 2025-04-28 RX ORDER — PROPOFOL 10 MG/ML
VIAL (ML) INTRAVENOUS AS NEEDED
Status: DISCONTINUED | OUTPATIENT
Start: 2025-04-28 | End: 2025-04-28 | Stop reason: SURG

## 2025-04-28 RX ORDER — MIDAZOLAM HYDROCHLORIDE 2 MG/2ML
2 INJECTION, SOLUTION INTRAMUSCULAR; INTRAVENOUS ONCE
Status: COMPLETED | OUTPATIENT
Start: 2025-04-28 | End: 2025-04-28

## 2025-04-28 RX ORDER — HYDROCODONE BITARTRATE AND ACETAMINOPHEN 5; 325 MG/1; MG/1
1 TABLET ORAL ONCE AS NEEDED
Refills: 0 | Status: DISCONTINUED | OUTPATIENT
Start: 2025-04-28 | End: 2025-04-28 | Stop reason: HOSPADM

## 2025-04-28 RX ORDER — FENTANYL CITRATE 50 UG/ML
INJECTION, SOLUTION INTRAMUSCULAR; INTRAVENOUS AS NEEDED
Status: DISCONTINUED | OUTPATIENT
Start: 2025-04-28 | End: 2025-04-28 | Stop reason: SURG

## 2025-04-28 RX ORDER — BUPIVACAINE HYDROCHLORIDE AND EPINEPHRINE 2.5; 5 MG/ML; UG/ML
INJECTION, SOLUTION EPIDURAL; INFILTRATION; INTRACAUDAL; PERINEURAL AS NEEDED
Status: DISCONTINUED | OUTPATIENT
Start: 2025-04-28 | End: 2025-04-28 | Stop reason: HOSPADM

## 2025-04-28 RX ORDER — SODIUM CHLORIDE 0.9 % (FLUSH) 0.9 %
10 SYRINGE (ML) INJECTION AS NEEDED
Status: DISCONTINUED | OUTPATIENT
Start: 2025-04-28 | End: 2025-04-28 | Stop reason: HOSPADM

## 2025-04-28 RX ORDER — OXYCODONE HYDROCHLORIDE 5 MG/1
5 TABLET ORAL
Status: COMPLETED | OUTPATIENT
Start: 2025-04-28 | End: 2025-04-28

## 2025-04-28 RX ORDER — KETOROLAC TROMETHAMINE 30 MG/ML
INJECTION, SOLUTION INTRAMUSCULAR; INTRAVENOUS AS NEEDED
Status: DISCONTINUED | OUTPATIENT
Start: 2025-04-28 | End: 2025-04-28 | Stop reason: SURG

## 2025-04-28 RX ORDER — DEXMEDETOMIDINE HYDROCHLORIDE 100 UG/ML
INJECTION, SOLUTION INTRAVENOUS AS NEEDED
Status: DISCONTINUED | OUTPATIENT
Start: 2025-04-28 | End: 2025-04-28 | Stop reason: SURG

## 2025-04-28 RX ORDER — MAGNESIUM HYDROXIDE 1200 MG/15ML
LIQUID ORAL AS NEEDED
Status: DISCONTINUED | OUTPATIENT
Start: 2025-04-28 | End: 2025-04-28 | Stop reason: HOSPADM

## 2025-04-28 RX ORDER — HYDROCODONE BITARTRATE AND ACETAMINOPHEN 5; 325 MG/1; MG/1
1 TABLET ORAL EVERY 4 HOURS PRN
Qty: 12 TABLET | Refills: 0 | Status: SHIPPED | OUTPATIENT
Start: 2025-04-28

## 2025-04-28 RX ORDER — PROMETHAZINE HYDROCHLORIDE 25 MG/1
25 TABLET ORAL ONCE AS NEEDED
Status: DISCONTINUED | OUTPATIENT
Start: 2025-04-28 | End: 2025-04-28 | Stop reason: HOSPADM

## 2025-04-28 RX ORDER — PETROLATUM,WHITE
OINTMENT IN PACKET (GRAM) TOPICAL AS NEEDED
Status: DISCONTINUED | OUTPATIENT
Start: 2025-04-28 | End: 2025-04-28 | Stop reason: SURG

## 2025-04-28 RX ORDER — DOCUSATE SODIUM 100 MG/1
100 CAPSULE, LIQUID FILLED ORAL 2 TIMES DAILY PRN
Qty: 10 CAPSULE | Refills: 1 | Status: SHIPPED | OUTPATIENT
Start: 2025-04-28 | End: 2026-04-28

## 2025-04-28 RX ORDER — ACETAMINOPHEN 500 MG
1000 TABLET ORAL ONCE
Status: COMPLETED | OUTPATIENT
Start: 2025-04-28 | End: 2025-04-28

## 2025-04-28 RX ORDER — SODIUM CHLORIDE 9 MG/ML
40 INJECTION, SOLUTION INTRAVENOUS AS NEEDED
Status: DISCONTINUED | OUTPATIENT
Start: 2025-04-28 | End: 2025-04-28 | Stop reason: HOSPADM

## 2025-04-28 RX ORDER — LIDOCAINE HYDROCHLORIDE 10 MG/ML
INJECTION, SOLUTION INFILTRATION; PERINEURAL AS NEEDED
Status: DISCONTINUED | OUTPATIENT
Start: 2025-04-28 | End: 2025-04-28 | Stop reason: HOSPADM

## 2025-04-28 RX ADMIN — OXYCODONE 5 MG: 5 TABLET ORAL at 09:29

## 2025-04-28 RX ADMIN — Medication 0.5 G: at 08:11

## 2025-04-28 RX ADMIN — SODIUM CHLORIDE, POTASSIUM CHLORIDE, SODIUM LACTATE AND CALCIUM CHLORIDE 9 ML/HR: 600; 310; 30; 20 INJECTION, SOLUTION INTRAVENOUS at 07:45

## 2025-04-28 RX ADMIN — DEXAMETHASONE SODIUM PHOSPHATE 4 MG: 4 INJECTION, SOLUTION INTRAMUSCULAR; INTRAVENOUS at 08:16

## 2025-04-28 RX ADMIN — FENTANYL CITRATE 50 MCG: 50 INJECTION, SOLUTION INTRAMUSCULAR; INTRAVENOUS at 08:09

## 2025-04-28 RX ADMIN — DEXMEDETOMIDINE 10 MCG: 100 INJECTION, SOLUTION, CONCENTRATE INTRAVENOUS at 08:45

## 2025-04-28 RX ADMIN — ACETAMINOPHEN 1000 MG: 500 TABLET ORAL at 07:45

## 2025-04-28 RX ADMIN — DEXMEDETOMIDINE 10 MCG: 100 INJECTION, SOLUTION, CONCENTRATE INTRAVENOUS at 08:23

## 2025-04-28 RX ADMIN — LIDOCAINE HYDROCHLORIDE 100 MG: 20 INJECTION, SOLUTION EPIDURAL; INFILTRATION; INTRACAUDAL; PERINEURAL at 08:09

## 2025-04-28 RX ADMIN — FENTANYL CITRATE 50 MCG: 50 INJECTION, SOLUTION INTRAMUSCULAR; INTRAVENOUS at 08:24

## 2025-04-28 RX ADMIN — PROPOFOL 180 MG: 10 INJECTION, EMULSION INTRAVENOUS at 08:09

## 2025-04-28 RX ADMIN — OXYCODONE 5 MG: 5 TABLET ORAL at 10:06

## 2025-04-28 RX ADMIN — SODIUM CHLORIDE 2000 MG: 9 INJECTION, SOLUTION INTRAVENOUS at 08:15

## 2025-04-28 RX ADMIN — DEXMEDETOMIDINE 10 MCG: 100 INJECTION, SOLUTION, CONCENTRATE INTRAVENOUS at 08:15

## 2025-04-28 RX ADMIN — ONDANSETRON 4 MG: 2 INJECTION INTRAMUSCULAR; INTRAVENOUS at 08:16

## 2025-04-28 RX ADMIN — MIDAZOLAM HYDROCHLORIDE 2 MG: 1 INJECTION, SOLUTION INTRAMUSCULAR; INTRAVENOUS at 07:47

## 2025-04-28 RX ADMIN — KETOROLAC TROMETHAMINE 30 MG: 30 INJECTION, SOLUTION INTRAMUSCULAR; INTRAVENOUS at 08:45

## 2025-04-28 NOTE — H&P
History of Present Illness     Ayesha Patel is a 39 y.o. female presents to [facility] GENERAL SURGERY   History of Present Illness  The patient presents for evaluation of abdominal masses.     She reports the presence of palpable masses in her abdomen and one on the posterior aspect of her leg. These masses have been persistent since 2020, following a mild COVID-19 infection. She has been experiencing intermittent deep aching pain in various locations including her forearms, legs, and chest. The pain is transient, resolving spontaneously. She has sought relief through the use of heating pads and hot baths. Despite consultations with two oncologists and undergoing two to three CT scans and two mammograms, the cause of her symptoms remains undetermined. The masses are associated with pain, which is intermittent, but the masses themselves are persistent. She experiences symptom-free periods, particularly during the summer. A previous physician suggested a possible diagnosis of fibromyalgia. An ultrasound-guided biopsy of one of the abdominal masses was performed at [facility], but no abnormalities were detected. The radiologist suggested the possibility of lipomas. She also underwent endometriosis surgery due to concerns about potential ovarian issues. Biopsies of a polyp and tissue were negative. The largest mass is located in her abdomen, causing significant discomfort. She expresses a preference for surgical removal of the masses under sedation, citing a previous unpleasant experience with local anesthesia during a cyst removal procedure.  Objective  Medical History        Past Medical History:   Diagnosis Date    Allergy to alpha-gal      Hypertension      Shellfish allergy      Thyroid cyst              Surgical History   History reviewed. No pertinent surgical history.          Current Medications      Current Outpatient Medications:     amLODIPine (NORVASC) 5 MG tablet, Take 1.5 tablets  "by mouth Daily., Disp: 135 tablet, Rfl: 1    EPINEPHrine (EPIPEN) 0.3 MG/0.3ML solution auto-injector injection, , Disp: , Rfl:     hydroCHLOROthiazide 25 MG tablet, TAKE 1.5 TABLETS BY MOUTH DAILY., Disp: 135 tablet, Rfl: 3    medroxyPROGESTERone (PROVERA) 10 MG tablet, Take 1 tablet by mouth Daily., Disp: , Rfl:     meloxicam (Mobic) 15 MG tablet, Take 1 tablet by mouth Daily., Disp: 90 tablet, Rfl: 1    rosuvastatin (Crestor) 10 MG tablet, Take 1 tablet by mouth Daily., Disp: 90 tablet, Rfl: 1    nystatin (MYCOSTATIN) 359954 UNIT/GM cream, Apply 1 Application topically to the appropriate area as directed 2 (Two) Times a Day. (Patient not taking: Reported on 3/20/2025), Disp: 30 g, Rfl: 1    nystatin (MYCOSTATIN) 488056 UNIT/GM powder, Apply  topically to the appropriate area as directed 3 (Three) Times a Day. (Patient not taking: Reported on 3/20/2025), Disp: 60 g, Rfl: 5        Allergies         Allergies   Allergen Reactions    Lisinopril GI Intolerance       \"flipped her intestines inside out and hospitalized her\"                  Family History   Problem Relation Age of Onset    Hypertension Mother      Heart disease Maternal Uncle      Heart disease Maternal Grandmother      Prostate cancer Paternal Grandfather           prostate    Hyperlipidemia Father      Prostate cancer Paternal Uncle           several P.Uncles    Rheum arthritis Paternal Aunt           and giant cell arteritis    Prostate cancer Paternal Uncle           Social History   Social History            Socioeconomic History    Marital status:    Tobacco Use    Smoking status: Never       Passive exposure: Never    Smokeless tobacco: Never   Vaping Use    Vaping status: Never Used   Substance and Sexual Activity    Alcohol use: Never    Drug use: Never    Sexual activity: Yes       Partners: Male       Comment:  had vasectomy            Vital Signs:   Resp 16   Ht 162.6 cm (64\")   Wt 88.9 kg (196 lb)   BMI 33.64 kg/m²    "   Physical Exam   Physical Exam  The patient is in no acute distress.  Breathing is nonlabored.  The abdomen is soft. There are a couple of spots on the abdomen that are masses, one is about 2 cm and the other is about 1.5 cm on the left and right side of the abdomen.        Result Review  :           Procedures   Results  Imaging  Ultrasound-guided biopsy of one of the bumps on the abdomen showed no abnormalities.        Assessment  Assessment and Plan   Diagnoses and all orders for this visit:     1. Lipoma of abdominal wall (Primary)              Assessment & Plan  1. Symptomatic abdominal masses, likely lipomas.  The patient presents with multiple abdominal masses that are likely lipomas. These masses are benign fatty tumors that can cause discomfort and pain. An excision of the masses in the operating room is planned. The risks, benefits, and alternatives of the procedure were discussed extensively. Risks include bleeding and infection, although major complications are not anticipated. The patient voiced understanding and agreed to proceed with the plan. The excised tissue will be sent for pathology to confirm the diagnosis.     PROCEDURE  The patient underwent endometriosis surgery with biopsies of a polyp and tissue, which were negative.        Follow Up   No follow-ups on file.  Patient was given instructions and counseling regarding her condition or for health maintenance advice. Please see specific information pulled into the AVS if appropriate.      Discussed with the patient - all questions were answered they voiced understanding and agreed to proceed with above plan     Patient or patient representative verbalized consent for the use of Ambient Listening during the visit with  Jero Munson MD for chart documentation. 3/21/2025  13:45 EDT     Jero Munson MD             Contains text generated by [employer]

## 2025-04-28 NOTE — OP NOTE
Preoperative diagnosis: Multiple lipomas    Postoperative diagnosis: Same    Findings: Abdominal lipomas x 4, left leg lipoma  Right lateral abdominal lipoma 2 x 3 cm  Right medial abdominal lipoma.  3 x 4 cm  Left lateral abdominal lipoma 2 x 3 cm  Left medial abdominal lipoma 3 x 4 cm  Left leg lipoma 2 x 2 cm  All taken from the deep subcutaneous tissue    Procedure: Excision of multiple abdominal wall lipomas and leg lipoma    Surgeon: Jero Munson MD    Anesthesia: General    Assistant: Elinor TORRES CSA    EBL: 3 mL    Specimens: Abdominal wall lipomas and leg lipoma as detailed above    Complications: None    Indications: 39-year-old female with multiple masses in her subcutaneous tissue along the abdominal wall as well as left leg.  Presents today for elective excision.    PROCEDURE    Patient was seen in the preoperative holding area.  Risks, benefits, alternatives of the operation were again discussed in detail.  All of the patient and family's questions were answered.  They voiced understanding, and agreed to proceed.    Patient was brought to the operating room.  Monitoring devices and Prateek stockings were placed.  Anesthesia was administered.  Patient was prepped and draped in standard surgical fashion.  After prepping and draping a timeout was performed to verify both the correct patient and correct procedure.    All areas were addressed in the same manner.  We began by injecting local anesthesia over the marked area.  The patient marked each area herself at home and these were verified in the preoperative holding area.  We injected local anesthesia and carried our dissection down the combination of sharp dissection and electrocautery.  I circumferentially dissected free all areas until we got to the premuscular fascia.  These were areas were all then amputated and passed off for pathology.  We copiously irrigated the wound bed.  We ensuring everything was hemostatic.  We closed the incisions with  subcuticular 4-0 Vicryl sutures.  The wounds were dressed with skin affix skin glue.  This was done on all 4 abdominal masses as well as the left lower leg mass.    The patient was then aroused from anesthesia, and taken off the OR table, and taken to PACU in stable condition.  Sponge, needle, and instrument counts were correct x2.  Elinor TORRES CSA, was present for the entire procedure and helped in all portions of the case.    Assistant: Elinor Swenson CSA was responsible for performing the following activities: Retraction, Suction, Irrigation, Suturing, Closing, and Placing Dressing and their skilled assistance was necessary for the success of this case.      The operative note was dictated with the help of the dragon dictation system.

## 2025-04-28 NOTE — ANESTHESIA POSTPROCEDURE EVALUATION
Patient: Ayesha Patel    Procedure Summary       Date: 04/28/25 Room / Location: [facility] ED OR [address] [place] [address] MAIN OR    Anesthesia Start: 0805 Anesthesia Stop: 0917    Procedure: ABDOMEN LIPOMA EXCISION X FOUR ,LEFT LEG LIPOMA EXCISION Diagnosis:       Lipoma of abdominal wall      (Lipoma of abdominal wall [D17.1])    Surgeons: Jero Munson MD Provider: Jaydon Rodriguez MD    Anesthesia Type: general ASA Status: 2            Anesthesia Type: general    Vitals  Vitals Value Taken Time   /78 04/28/25 09:55   Temp 36.7 °C (98.1 °F) 04/28/25 09:50   Pulse 63 04/28/25 09:56   Resp 12 04/28/25 09:55   SpO2 98 % 04/28/25 09:56   Vitals shown include unfiled device data.        Post Anesthesia Care and Evaluation    Patient location during evaluation: bedside  Patient participation: complete - patient participated  Level of consciousness: awake    Airway patency: patent  PONV Status: none  Cardiovascular status: acceptable  Respiratory status: acceptable  Hydration status: acceptable

## 2025-04-28 NOTE — ANESTHESIA PREPROCEDURE EVALUATION
Anesthesia Evaluation     Patient summary reviewed and Nursing notes reviewed                Airway   Mallampati: I  TM distance: >3 FB  Neck ROM: full  No difficulty expected  Dental      Pulmonary - negative pulmonary ROS and normal exam    breath sounds clear to auscultation  Cardiovascular - negative cardio ROS and normal exam    Rhythm: regular  Rate: normal    (+) hypertension, hyperlipidemia      Neuro/Psych- negative ROS  GI/Hepatic/Renal/Endo - negative ROS   (+) obesity, thyroid problem     Musculoskeletal (-) negative ROS    Abdominal    Substance History - negative use     OB/GYN negative ob/gyn ROS         Other                          Anesthesia Plan    ASA 2     general     intravenous induction     Anesthetic plan, risks, benefits, and alternatives have been provided, discussed and informed consent has been obtained with: patient.        CODE STATUS:

## 2025-04-29 ENCOUNTER — TELEPHONE (OUTPATIENT)
Dept: SURGERY | Facility: CLINIC | Age: 40
End: 2025-04-29
Payer: COMMERCIAL

## 2025-04-29 LAB
CYTO UR: NORMAL
LAB AP CASE REPORT: NORMAL
LAB AP CLINICAL INFORMATION: NORMAL
PATH REPORT.FINAL DX SPEC: NORMAL
PATH REPORT.GROSS SPEC: NORMAL

## 2025-04-29 NOTE — TELEPHONE ENCOUNTER
PATIENT CALLED.  SHE HAD SURGERY YESTERDAY.  HER THROAT HURTS BAD, AND FEELS LIKE LYMPH NODES ARE VERY SWOLLEN AND SORE IN HER NECK.  IT HURTS WHEN SHE SWALLOWS.    SHE SAID AT HER CONSULTATION SHE WAS TOLD SHE WOULDN'T HAVE TO HAVE A BREATHING TUBE.  DID SHE END UP HAVING TO HAVE ONE?    #[phone]

## 2025-05-01 ENCOUNTER — TELEPHONE (OUTPATIENT)
Dept: FAMILY MEDICINE CLINIC | Age: 40
End: 2025-05-01
Payer: COMMERCIAL

## 2025-05-01 NOTE — TELEPHONE ENCOUNTER
Spoke with pt regarding overdue lipid lab ordered 1/16/25 with expected date of 1/16/25. 1st attempt

## 2025-05-02 RX ORDER — PREDNISONE 50 MG/1
25 TABLET ORAL DAILY
Qty: 10 TABLET | Refills: 0 | Status: SHIPPED | OUTPATIENT
Start: 2025-05-02 | End: 2026-05-02

## 2025-05-02 RX ORDER — AMOXICILLIN 500 MG/1
500 CAPSULE ORAL 2 TIMES DAILY
Qty: 14 CAPSULE | Refills: 0 | Status: SHIPPED | OUTPATIENT
Start: 2025-05-02

## 2025-05-02 NOTE — TELEPHONE ENCOUNTER
PER DR DOTSON HE CALLED IN A ANTIBIOTIC AND STEROIDS FOR THE PATIENT. HE WANTS THE PATIENT TO TRY THE ANTIBIOTICS FIRST BEFORE TAKING THE STEROIDS. PATIENT HAS BEEN NOTIFIED.

## 2025-05-02 NOTE — TELEPHONE ENCOUNTER
PATIENT CALLED. HER THROAT IS STILL SORE, AND IS MUCH MORE SORE THAN USUAL WITH SURGERY.  THE LEFT SIDE OF HER NECK AND LYMPH NODE IS SWOLLEN.  THROAT SWOLLEN.  EARS HAVE PRESSURE AND ARE HURTING.    SHE SAID WHEN SHE SPOKE TO DR. DOTSON TUESTEVANDAY, HE TOLD HER TO CALL IF IT DIDN'T GET BETTER, AND HE WOULD SEND IN SOMETHING FOR HER.    SHE USES [facility].    #[phone]

## 2025-05-06 ENCOUNTER — TELEPHONE (OUTPATIENT)
Dept: SURGERY | Facility: CLINIC | Age: 40
End: 2025-05-06
Payer: COMMERCIAL

## 2025-05-06 NOTE — TELEPHONE ENCOUNTER
PATIENT CALLED AND SAID ONE OF THE INCISIONS IS OPENING UP UNDER HER BREAST, AND THERE IS A SMALL HOLE.  SHE SAID IT IS A LITTLE RED AROUND IT.  NO DRAINAGE.  SKIN IS RAW AND WHITE.    SHE IS ON AN ANTIBIOTIC THAT DR. DOTSON PRESCRIBED FOR HER THROAT.      SHE IS GOING TO TRY TO UPLOAD A PICTURE TO MY CHART.    #[phone]

## 2025-05-11 ENCOUNTER — PATIENT MESSAGE (OUTPATIENT)
Dept: FAMILY MEDICINE CLINIC | Age: 40
End: 2025-05-11
Payer: COMMERCIAL

## 2025-05-11 DIAGNOSIS — E55.9 VITAMIN D DEFICIENCY: ICD-10-CM

## 2025-05-11 DIAGNOSIS — R53.83 OTHER FATIGUE: Primary | ICD-10-CM

## 2025-05-12 ENCOUNTER — TELEPHONE (OUTPATIENT)
Dept: FAMILY MEDICINE CLINIC | Age: 40
End: 2025-05-12
Payer: COMMERCIAL

## 2025-05-12 NOTE — TELEPHONE ENCOUNTER
Caller: Ayesha Patel    Relationship: Self    Best call back number: [phone]    What is the best time to reach you: ANYTIME    Who are you requesting to speak with (clinical staff, provider,  specific staff member): NURSE         What was the call regarding: PATIENT WOULD LIKE TO ADD TO HER LAB WORK, ANYTHING RELATING TO HORMONES SO THAT SHE CAN GET THAT ALL CHECKED AS WELL.

## 2025-05-13 ENCOUNTER — OFFICE VISIT (OUTPATIENT)
Dept: SURGERY | Facility: CLINIC | Age: 40
End: 2025-05-13
Payer: COMMERCIAL

## 2025-05-13 VITALS — RESPIRATION RATE: 20 BRPM | WEIGHT: 196.9 LBS | BODY MASS INDEX: 33.61 KG/M2 | HEIGHT: 64 IN

## 2025-05-13 DIAGNOSIS — D17.1 LIPOMA OF ABDOMINAL WALL: Primary | ICD-10-CM

## 2025-05-13 PROCEDURE — 99024 POSTOP FOLLOW-UP VISIT: CPT | Performed by: SURGERY

## 2025-05-13 NOTE — PROGRESS NOTES
Chief Complaint: Follow-up (Abdominal wall lipoma removal)    Subjective         History of Present Illness      Ayesha Patel is a 39 y.o. female presents to [facility] GENER[facility] SUR[facility]     History of Present Illness  The patient presents for evaluation of a sore throat and postoperative care.    She reports an intermittent sore throat, localized to one side, which has shown some improvement but persists. The pain is described as severe during swallowing, although it is not constant. She has completed her course of Bactrim and was previously advised against the use of steroids. The sore throat has been present since the intubation during her recent surgery.    The surgical incisions appear to be healing well, with the exception of one that exhibits significant swelling. This particular incision was swollen prior to the onset of the current swelling. The swelling around this incision fluctuates, occasionally reducing before increasing again, but never completely subsiding. A bruise on her arm from an unsuccessful IV insertion is also noted. The incision on the posterior aspect of her leg appears to be healing optimally. She expresses concern about the best topical treatment for her scars as they continue to heal.    She mentions that the lipomas excised during surgery had been a concern since 2020, initially causing pain and growing over time. Previous attempts to diagnose the lipomas through ultrasound needle biopsy were unsuccessful. She is relieved that the pathology confirmed the lipomas were benign.    PAST SURGICAL HISTORY:  Lipoma excision on 05/2025.    Objective     Past Medical History:   Diagnosis Date    Abnormal uterine bleeding (AUB)     Allergy to alpha-gal     Endometriosis     Hyperlipidemia     Hypertension     Lipoma     STOMACH, LEG    Shellfish allergy     Thyroid cyst        Past Surgical History:   Procedure Laterality Date    COLONOSCOPY      DIAGNOSTIC LAPAROSCOPY       "ENDOSCOPY      EXCISION LESION N/A 4/28/2025    Procedure: ABDOMEN LIPOMA EXCISION X FOUR ,LEFT LEG LIPOMA EXCISION;  Surgeon: Jero Munson MD;  Location: [facility] MAIN OR;  Service: General;  Laterality: N/A;         Current Outpatient Medications:     amLODIPine (NORVASC) 5 MG tablet, Take 1.5 tablets by mouth Daily., Disp: 135 tablet, Rfl: 1    amoxicillin (AMOXIL) 500 MG capsule, Take 1 capsule by mouth 2 (Two) Times a Day., Disp: 14 capsule, Rfl: 0    docusate sodium (Colace) 100 MG capsule, Take 1 capsule by mouth 2 (Two) Times a Day As Needed for Constipation., Disp: 10 capsule, Rfl: 1    EPINEPHrine (EPIPEN) 0.3 MG/0.3ML solution auto-injector injection, , Disp: , Rfl:     hydroCHLOROthiazide 25 MG tablet, TAKE 1.5 TABLETS BY MOUTH DAILY., Disp: 135 tablet, Rfl: 3    HYDROcodone-acetaminophen (NORCO) 5-325 MG per tablet, Take 1 tablet by mouth Every 4 (Four) Hours As Needed for Moderate Pain (Pain)., Disp: 12 tablet, Rfl: 0    ibuprofen (ADVIL,MOTRIN) 800 MG tablet, Take 1 tablet by mouth Every 8 (Eight) Hours As Needed for Mild Pain., Disp: 30 tablet, Rfl: 0    norethindrone-ethinyl estradiol-ferrous fumarate (LOESTIN 24 FE) 1-20 MG-MCG(24) per tablet, Take 1 tablet by mouth Daily., Disp: , Rfl:     nystatin (MYCOSTATIN) 560177 UNIT/GM cream, Apply 1 Application topically to the appropriate area as directed 2 (Two) Times a Day. (Patient not taking: Reported on 3/13/2025), Disp: 30 g, Rfl: 1    nystatin (MYCOSTATIN) 259818 UNIT/GM powder, Apply  topically to the appropriate area as directed 3 (Three) Times a Day. (Patient not taking: Reported on 3/13/2025), Disp: 60 g, Rfl: 5    predniSONE (DELTASONE) 50 MG tablet, Take 0.5 tablets by mouth Daily., Disp: 10 tablet, Rfl: 0    rosuvastatin (Crestor) 10 MG tablet, Take 1 tablet by mouth Daily., Disp: 90 tablet, Rfl: 1    Allergies   Allergen Reactions    Alpha-Gal Nausea Only and Myalgia    Lisinopril GI Intolerance     \"flipped her intestines inside out " "and hospitalized her\"    Shellfish-Derived Products Swelling     MAKES TONGUE SWELL        Family History   Problem Relation Age of Onset    Hypertension Mother     Heart disease Maternal Uncle     Heart disease Maternal Grandmother     Prostate cancer Paternal Grandfather         prostate    Hyperlipidemia Father     Prostate cancer Paternal Uncle         several P.Uncles    Rheum arthritis Paternal Aunt         and giant cell arteritis    Prostate cancer Paternal Uncle        Social History     Socioeconomic History    Marital status:    Tobacco Use    Smoking status: Never     Passive exposure: Never    Smokeless tobacco: Never   Vaping Use    Vaping status: Never Used   Substance and Sexual Activity    Alcohol use: Never    Drug use: Never    Sexual activity: Yes     Partners: Male     Comment:  had vasectomy        Physical Exam   Physical Exam  General: The patient is in no acute distress.  Respiratory: Breathing is nonlabored.  Gastrointestinal: Abdomen is soft.  Others: The incisions on the patient's abdomen and leg appear to be healing well. The incision on the right side, closer to the midline, has slightly opened at the corner. It has some scabbing but does not seem to be infected or have any other issues.        Result Review :            Results  Labs   - Pathology report: Confirmed lipoma         Assessment and Plan    Diagnoses and all orders for this visit:    1. Lipoma of abdominal wall (Primary)          Assessment & Plan  1. Postoperative care.  She is demonstrating satisfactory progress in her recovery from the lipoma excision, with the exception of a minor issue at one corner of the incision. There is no evidence of infection at this time. Pathology report confirmed lipoma. Postoperative care and skin care were discussed. She was advised to use vitamin A and E lotion for scar healing. If she prefers, she can use Neosporin or bacitracin, but should apply it sparingly to avoid " keeping the area too moist. If new lipomas develop and cause discomfort or pain, she will notify us for further management.    2. Sore throat.  She has been experiencing a persistent sore throat following intubation. A short course of prednisone 5 mg for 3-5 days was recommended to help with the swelling. If there is no improvement after the steroid course, a referral to an Ear, Nose, and Throat (ENT) specialist will be considered. Communication with the anesthesiology department may also be necessary. Risks and benefits of steroid use were discussed, including the potential delay in wound healing due to the immunosuppressive effects of steroids. However, given the time elapsed since surgery, a short course of low-dose steroids is deemed appropriate.      Follow Up   No follow-ups on file.  Patient was given instructions and counseling regarding her condition or for health maintenance advice. Please see specific information pulled into the AVS if appropriate.     Patient or patient representative verbalized consent for the use of Ambient Listening during the visit with  Jero Munson MD for chart documentation. 5/13/2025  15:30 EDT    Jero Munson MD

## 2025-05-15 ENCOUNTER — LAB (OUTPATIENT)
Dept: LAB | Facility: HOSPITAL | Age: 40
End: 2025-05-15
Payer: COMMERCIAL

## 2025-05-15 DIAGNOSIS — I10 PRIMARY HYPERTENSION: ICD-10-CM

## 2025-05-15 DIAGNOSIS — E55.9 VITAMIN D DEFICIENCY: ICD-10-CM

## 2025-05-15 DIAGNOSIS — R53.83 OTHER FATIGUE: ICD-10-CM

## 2025-05-15 LAB
25(OH)D3 SERPL-MCNC: 45.7 NG/ML (ref 30–100)
BASOPHILS # BLD AUTO: 0.05 10*3/MM3 (ref 0–0.2)
BASOPHILS NFR BLD AUTO: 0.6 % (ref 0–1.5)
CHOLEST SERPL-MCNC: 148 MG/DL (ref 0–200)
DEPRECATED RDW RBC AUTO: 38.9 FL (ref 37–54)
EOSINOPHIL # BLD AUTO: 0.21 10*3/MM3 (ref 0–0.4)
EOSINOPHIL NFR BLD AUTO: 2.5 % (ref 0.3–6.2)
ERYTHROCYTE [DISTWIDTH] IN BLOOD BY AUTOMATED COUNT: 12.7 % (ref 12.3–15.4)
FERRITIN SERPL-MCNC: 32.5 NG/ML (ref 13–150)
FSH SERPL-ACNC: 4.7 MIU/ML
HCT VFR BLD AUTO: 40.7 % (ref 34–46.6)
HDLC SERPL-MCNC: 30 MG/DL (ref 40–60)
HGB BLD-MCNC: 13.5 G/DL (ref 12–15.9)
IMM GRANULOCYTES # BLD AUTO: 0.01 10*3/MM3 (ref 0–0.05)
IMM GRANULOCYTES NFR BLD AUTO: 0.1 % (ref 0–0.5)
IRON 24H UR-MRATE: 83 MCG/DL (ref 37–145)
IRON SATN MFR SERPL: 15 % (ref 20–50)
LDLC SERPL CALC-MCNC: 88 MG/DL (ref 0–100)
LDLC/HDLC SERPL: 2.79 {RATIO}
LH SERPL-ACNC: 4.19 MIU/ML
LYMPHOCYTES # BLD AUTO: 2.09 10*3/MM3 (ref 0.7–3.1)
LYMPHOCYTES NFR BLD AUTO: 25.2 % (ref 19.6–45.3)
MCH RBC QN AUTO: 27.7 PG (ref 26.6–33)
MCHC RBC AUTO-ENTMCNC: 33.2 G/DL (ref 31.5–35.7)
MCV RBC AUTO: 83.6 FL (ref 79–97)
MONOCYTES # BLD AUTO: 0.38 10*3/MM3 (ref 0.1–0.9)
MONOCYTES NFR BLD AUTO: 4.6 % (ref 5–12)
NEUTROPHILS NFR BLD AUTO: 5.55 10*3/MM3 (ref 1.7–7)
NEUTROPHILS NFR BLD AUTO: 67 % (ref 42.7–76)
PLATELET # BLD AUTO: 315 10*3/MM3 (ref 140–450)
PMV BLD AUTO: 10.1 FL (ref 6–12)
PROLACTIN SERPL-MCNC: 34.2 NG/ML (ref 4.79–23.3)
RBC # BLD AUTO: 4.87 10*6/MM3 (ref 3.77–5.28)
T3FREE SERPL-MCNC: 3.98 PG/ML (ref 2–4.4)
T4 FREE SERPL-MCNC: 1 NG/DL (ref 0.92–1.68)
TIBC SERPL-MCNC: 562 MCG/DL (ref 298–536)
TRANSFERRIN SERPL-MCNC: 377 MG/DL (ref 200–360)
TRIGL SERPL-MCNC: 171 MG/DL (ref 0–150)
TSH SERPL DL<=0.05 MIU/L-ACNC: 1.96 UIU/ML (ref 0.27–4.2)
VIT B12 BLD-MCNC: 230 PG/ML (ref 211–946)
VLDLC SERPL-MCNC: 30 MG/DL (ref 5–40)
WBC NRBC COR # BLD AUTO: 8.29 10*3/MM3 (ref 3.4–10.8)

## 2025-05-15 PROCEDURE — 84466 ASSAY OF TRANSFERRIN: CPT

## 2025-05-15 PROCEDURE — 84439 ASSAY OF FREE THYROXINE: CPT

## 2025-05-15 PROCEDURE — 86800 THYROGLOBULIN ANTIBODY: CPT

## 2025-05-15 PROCEDURE — 83540 ASSAY OF IRON: CPT

## 2025-05-15 PROCEDURE — 85025 COMPLETE CBC W/AUTO DIFF WBC: CPT

## 2025-05-15 PROCEDURE — 36415 COLL VENOUS BLD VENIPUNCTURE: CPT

## 2025-05-15 PROCEDURE — 82672 ASSAY OF ESTROGEN: CPT

## 2025-05-15 PROCEDURE — 84481 FREE ASSAY (FT-3): CPT

## 2025-05-15 PROCEDURE — 86376 MICROSOMAL ANTIBODY EACH: CPT

## 2025-05-15 PROCEDURE — 82728 ASSAY OF FERRITIN: CPT

## 2025-05-15 PROCEDURE — 83002 ASSAY OF GONADOTROPIN (LH): CPT

## 2025-05-15 PROCEDURE — 83001 ASSAY OF GONADOTROPIN (FSH): CPT

## 2025-05-15 PROCEDURE — 80061 LIPID PANEL: CPT

## 2025-05-15 PROCEDURE — 84146 ASSAY OF PROLACTIN: CPT

## 2025-05-15 PROCEDURE — 84443 ASSAY THYROID STIM HORMONE: CPT

## 2025-05-15 PROCEDURE — 82306 VITAMIN D 25 HYDROXY: CPT

## 2025-05-15 PROCEDURE — 82607 VITAMIN B-12: CPT

## 2025-05-15 PROCEDURE — 84402 ASSAY OF FREE TESTOSTERONE: CPT

## 2025-05-16 LAB
THYROGLOB AB SERPL-ACNC: <1 IU/ML (ref 0–0.9)
THYROPEROXIDASE AB SERPL-ACNC: 14 IU/ML (ref 0–34)

## 2025-05-17 LAB — ESTROGEN SERPL-MCNC: 53 PG/ML

## 2025-05-18 LAB — TESTOST FREE SERPL-MCNC: 0.5 PG/ML (ref 0–4.2)

## 2025-05-19 ENCOUNTER — OFFICE VISIT (OUTPATIENT)
Dept: FAMILY MEDICINE CLINIC | Age: 40
End: 2025-05-19
Payer: COMMERCIAL

## 2025-05-19 VITALS
TEMPERATURE: 98.2 F | DIASTOLIC BLOOD PRESSURE: 96 MMHG | OXYGEN SATURATION: 98 % | SYSTOLIC BLOOD PRESSURE: 157 MMHG | WEIGHT: 195.2 LBS | HEIGHT: 64 IN | HEART RATE: 77 BPM | BODY MASS INDEX: 33.32 KG/M2

## 2025-05-19 DIAGNOSIS — R79.89 ELEVATED PROLACTIN LEVEL: Primary | ICD-10-CM

## 2025-05-19 DIAGNOSIS — E66.811 CLASS 1 OBESITY DUE TO EXCESS CALORIES WITH SERIOUS COMORBIDITY AND BODY MASS INDEX (BMI) OF 33.0 TO 33.9 IN ADULT: ICD-10-CM

## 2025-05-19 DIAGNOSIS — I10 PRIMARY HYPERTENSION: ICD-10-CM

## 2025-05-19 DIAGNOSIS — E78.2 MIXED HYPERLIPIDEMIA: ICD-10-CM

## 2025-05-19 DIAGNOSIS — E66.09 CLASS 1 OBESITY DUE TO EXCESS CALORIES WITH SERIOUS COMORBIDITY AND BODY MASS INDEX (BMI) OF 33.0 TO 33.9 IN ADULT: ICD-10-CM

## 2025-05-19 RX ORDER — PROPRANOLOL HYDROCHLORIDE 60 MG/1
60 CAPSULE, EXTENDED RELEASE ORAL DAILY
Qty: 90 CAPSULE | Refills: 1 | Status: SHIPPED | OUTPATIENT
Start: 2025-05-19

## 2025-05-19 NOTE — PROGRESS NOTES
"Chief Complaint  Hypertension    Subjective          Ayesha Patel presents to [facility]  For HTN f/u. She is taking amlodipine 7.5mg daily and hctz 37.5mg daily. BP is elevated today. She has some concern that amlodipine is causing low libido as she had read that it could be an adverse reaction.     She is taking crestor 10mg daily for HLD. Recently had labs and cholesterol is much improved.     Elevated prolactin was noted at 34.2. b12 on lower end of normal at 230. Iron sat, transferrin and TIBC minimally off. Iron stores are lower end of normal at 32.5.     Would like to start GLP1 from [facility], but unfortunately it is no longer on the shortage list. She had went to a med spa in [facility], but would rather be rx by medical office. Denies family hx of thyroid cancer and personal hx of pancreatitis. Open to dietician referral. Working out occasionally.         Objective   Vital Signs:   Vitals:    05/19/25 0806 05/19/25 0811   BP: 176/96 157/96   BP Location: Right arm Right arm   Patient Position: Sitting Sitting   Cuff Size: Adult Adult   Pulse: 78 77   Temp: 98.2 °F (36.8 °C)    TempSrc: Oral    SpO2: 98%    Weight: 88.5 kg (195 lb 3.2 oz)    Height: 162.6 cm (64.02\")        Body mass index is 33.49 kg/m².         Physical Exam  Vitals reviewed.   Constitutional:       General: She is not in acute distress.     Appearance: She is well-developed. She is obese.   HENT:      Head: Normocephalic and atraumatic.   Eyes:      Conjunctiva/sclera: Conjunctivae normal.      Pupils: Pupils are equal, round, and reactive to light.   Cardiovascular:      Rate and Rhythm: Normal rate and regular rhythm.      Heart sounds: Normal heart sounds. No murmur heard.  Pulmonary:      Effort: Pulmonary effort is normal. No respiratory distress.      Breath sounds: Normal breath sounds.   Skin:     General: Skin is warm and dry.   Neurological:      Mental Status: She is alert and " oriented to person, place, and time.   Psychiatric:         Mood and Affect: Mood and affect normal.         Behavior: Behavior normal.         Thought Content: Thought content normal.         Judgment: Judgment normal.            Lab Results   Component Value Date    GLUCOSE 101 (H) 02/13/2025    BUN 13 02/13/2025    CREATININE 0.76 02/13/2025    EGFR 101.7 02/13/2025    BCR 17.1 02/13/2025    K 3.4 (L) 02/13/2025    CO2 28.3 02/13/2025    CALCIUM 9.8 02/13/2025    ALBUMIN 4.8 02/13/2025    BILITOT 0.5 02/13/2025    AST 26 02/13/2025    ALT 34 (H) 02/13/2025     Lab Results   Component Value Date    HGBA1C 5.30 02/08/2021     Lab Results   Component Value Date    WBC 8.29 05/15/2025    HGB 13.5 05/15/2025    HCT 40.7 05/15/2025    MCV 83.6 05/15/2025     05/15/2025     Lab Results   Component Value Date    CHOL 148 05/15/2025    CHOL 204 (H) 01/14/2025    CHOL 162 11/02/2021    CHLPL 173 11/20/2020     Lab Results   Component Value Date    TRIG 171 (H) 05/15/2025    TRIG 157 (H) 01/14/2025    TRIG 162 (H) 11/02/2021     Lab Results   Component Value Date    HDL 30 (L) 05/15/2025    HDL 35 (L) 01/14/2025    HDL 33 (L) 11/02/2021     Lab Results   Component Value Date    LDL 88 05/15/2025     (H) 01/14/2025     11/02/2021     Lab Results   Component Value Date    TSH 1.960 05/15/2025     Labs reviewed with patient during appointment.                 Assessment and Plan    Assessment & Plan  Elevated prolactin level  Will notify patient of results and treat accordingly.     Orders:    MRI Pituitary With & Without Contrast; Future    Primary hypertension  Contineu hctz 37.5mg daily. Stop amlodipine. Starting propranolol 60mg daily. Potential side effects of medication discussed.            Mixed hyperlipidemia   Stable on crestor 10mg daily.  Heart healthy diet. Routine exercise and weight loss. Continue to monitor.         Class 1 obesity due to excess calories with serious comorbidity and body  mass index (BMI) of 33.0 to 33.9 in adult  Patient's (Body mass index is 33.49 kg/m².) indicates that they are obese (BMI >30) with health conditions that include hypertension and dyslipidemias . Weight is worsening. BMI  is above average; BMI management plan is completed. We discussed portion control, increasing exercise, and going to different med spa that she feels comfortable with.     Orders:    Ambulatory Referral to Nutrition Services    Other orders    propranolol LA (Inderal LA) 60 MG 24 hr capsule; Take 1 capsule by mouth Daily.        Patient to notify office with any acute concerns or issues.  Patient verbalizes understanding, agrees with plan of care and has no further questions upon discharge.    Please note that portions of this note were completed with a voice recognition program.      Follow Up    Return in about 3 months (around 8/19/2025) for Recheck.  Patient was given instructions and counseling regarding her condition or for health maintenance advice. Please see specific information pulled into the AVS if appropriate.       Current Outpatient Medications:     EPINEPHrine (EPIPEN) 0.3 MG/0.3ML solution auto-injector injection, , Disp: , Rfl:     hydroCHLOROthiazide 25 MG tablet, TAKE 1.5 TABLETS BY MOUTH DAILY., Disp: 135 tablet, Rfl: 3    HYDROcodone-acetaminophen (NORCO) 5-325 MG per tablet, Take 1 tablet by mouth Every 4 (Four) Hours As Needed for Moderate Pain (Pain)., Disp: 12 tablet, Rfl: 0    ibuprofen (ADVIL,MOTRIN) 800 MG tablet, Take 1 tablet by mouth Every 8 (Eight) Hours As Needed for Mild Pain., Disp: 30 tablet, Rfl: 0    norethindrone-ethinyl estradiol-ferrous fumarate (LOESTIN 24 FE) 1-20 MG-MCG(24) per tablet, Take 1 tablet by mouth Daily., Disp: , Rfl:     nystatin (MYCOSTATIN) 746940 UNIT/GM cream, Apply 1 Application topically to the appropriate area as directed 2 (Two) Times a Day., Disp: 30 g, Rfl: 1    nystatin (MYCOSTATIN) 610374 UNIT/GM powder, Apply  topically to the  appropriate area as directed 3 (Three) Times a Day., Disp: 60 g, Rfl: 5    rosuvastatin (Crestor) 10 MG tablet, Take 1 tablet by mouth Daily., Disp: 90 tablet, Rfl: 1    propranolol LA (Inderal LA) 60 MG 24 hr capsule, Take 1 capsule by mouth Daily., Disp: 90 capsule, Rfl: 1      Medications Discontinued During This Encounter   Medication Reason    docusate sodium (Colace) 100 MG capsule Historical Med - Therapy completed    amoxicillin (AMOXIL) 500 MG capsule Historical Med - Therapy completed    predniSONE (DELTASONE) 50 MG tablet Historical Med - Therapy completed    amLODIPine (NORVASC) 5 MG tablet Historical Med - Therapy completed

## 2025-05-30 ENCOUNTER — TELEPHONE (OUTPATIENT)
Dept: FAMILY MEDICINE CLINIC | Age: 40
End: 2025-05-30
Payer: COMMERCIAL

## 2025-05-30 NOTE — TELEPHONE ENCOUNTER
Caller: Ayesha Patel    Relationship to patient: Self    Best call back number: [phone]    Chief complaint: N/A    Type of visit: OFFICE VISIT    Requested date: NEXT AVAILABLE.     If rescheduling, when is the original appointment: N/A     Additional notes:PATIENT WOULD LIKE TO SCHEDULE AN APPOINTMENT WITH DR COX FOR PREVIOUS MEDICAL ISSUES.  SHE HAS HAD THESE ISSUES FOR YEARS AND WOULD LIKE CONSULTATION AS TO NEXT STEPS. PLEASE CALL PATIENT BACK TO SCHEDULE AND ADVISE.

## 2025-06-01 NOTE — ASSESSMENT & PLAN NOTE
Stable on crestor 10mg daily.  Heart healthy diet. Routine exercise and weight loss. Continue to monitor.

## 2025-06-01 NOTE — ASSESSMENT & PLAN NOTE
Contineu hctz 37.5mg daily. Stop amlodipine. Starting propranolol 60mg daily. Potential side effects of medication discussed.

## 2025-06-02 ENCOUNTER — OFFICE VISIT (OUTPATIENT)
Dept: FAMILY MEDICINE CLINIC | Age: 40
End: 2025-06-02
Payer: COMMERCIAL

## 2025-06-02 VITALS
DIASTOLIC BLOOD PRESSURE: 95 MMHG | HEART RATE: 100 BPM | BODY MASS INDEX: 33.67 KG/M2 | HEIGHT: 64 IN | WEIGHT: 197.2 LBS | TEMPERATURE: 97.7 F | SYSTOLIC BLOOD PRESSURE: 163 MMHG | OXYGEN SATURATION: 98 %

## 2025-06-02 DIAGNOSIS — I10 PRIMARY HYPERTENSION: Primary | ICD-10-CM

## 2025-06-02 DIAGNOSIS — J06.9 ACUTE URI: ICD-10-CM

## 2025-06-02 RX ORDER — TRIAMCINOLONE ACETONIDE 40 MG/ML
40 INJECTION, SUSPENSION INTRA-ARTICULAR; INTRAMUSCULAR ONCE
Status: COMPLETED | OUTPATIENT
Start: 2025-06-02 | End: 2025-06-02

## 2025-06-02 RX ORDER — FLUCONAZOLE 150 MG/1
TABLET ORAL
Qty: 2 TABLET | Refills: 0 | Status: SHIPPED | OUTPATIENT
Start: 2025-06-02

## 2025-06-02 RX ORDER — ALBUTEROL SULFATE 90 UG/1
2 INHALANT RESPIRATORY (INHALATION) EVERY 4 HOURS PRN
Qty: 18 G | Refills: 2 | Status: SHIPPED | OUTPATIENT
Start: 2025-06-02

## 2025-06-02 RX ADMIN — TRIAMCINOLONE ACETONIDE 40 MG: 40 INJECTION, SUSPENSION INTRA-ARTICULAR; INTRAMUSCULAR at 16:18

## 2025-06-02 NOTE — PROGRESS NOTES
"Chief Complaint  Cough (X 2 weeks) and Sinusitis    Subjective          Ayesha Patel presents to [facility] for an acute visit complaining of productive cough, sinus pressure,  nasal congestion, headache, fatigue for the last 2 weeks.  She had amoxicillin 500 mg  x 7 days, along with steroid pack.  States that symptoms did not subside.  Denies fever, chills, sore throat, ear pain, abdominal pain, flank pain, nausea, vomiting, diarrhea, loss of taste or smell, shortness of air or chest pain. No known ill contacts.     She was started on propranolol 60 mg at last visit.  We had stopped her amlodipine 7.5 mg daily due to concern for it causing decreased libido per patient.  Patient's blood pressure is elevated today.  She is taking her HCTZ 37.5 mg daily.    Objective   Vital Signs:   Vitals:    06/02/25 1532 06/02/25 1614   BP: 160/88 163/95   Pulse: 101 100   Temp: 97.7 °F (36.5 °C)    TempSrc: Oral    SpO2: 98%    Weight: 89.4 kg (197 lb 3.2 oz)    Height: 162.6 cm (64.02\")        Body mass index is 33.83 kg/m².          Physical Exam  Constitutional:       Appearance: She is ill-appearing.   HENT:      Right Ear: External ear normal.      Left Ear: External ear normal.      Nose: Congestion and rhinorrhea present.      Mouth/Throat:      Mouth: Mucous membranes are moist.   Eyes:      Conjunctiva/sclera: Conjunctivae normal.      Pupils: Pupils are equal, round, and reactive to light.   Cardiovascular:      Rate and Rhythm: Normal rate and regular rhythm.      Heart sounds: Normal heart sounds. No murmur heard.  Pulmonary:      Effort: Pulmonary effort is normal. No respiratory distress.      Breath sounds: Wheezing (exp wheezes anmol upper lobes) present.   Lymphadenopathy:      Cervical: No cervical adenopathy.   Skin:     General: Skin is warm and dry.   Neurological:      Mental Status: She is alert and oriented to person, place, and time.   Psychiatric:         Mood and " Affect: Mood normal.         Behavior: Behavior normal.         Thought Content: Thought content normal.         Judgment: Judgment normal.                        Assessment and Plan    Assessment & Plan  Primary hypertension   continue HCTZ 37.5 mg daily.  Encourage patient to start taking the propranolol 60 mg daily.         Acute URI  Kenalog injection given today in office.  Complete prescribed antibiotic.  Increase fluid intake and rest.  Tylenol/ibuprofen as needed.  Go to ER with shortness of air.  Zyrtec, Flonase daily.  Orders:    triamcinolone acetonide (KENALOG-40) injection 40 mg    Other orders    albuterol sulfate  (90 Base) MCG/ACT inhaler; Inhale 2 puffs Every 4 (Four) Hours As Needed for Wheezing or Shortness of Air.    amoxicillin-clavulanate (AUGMENTIN) 875-125 MG per tablet; Take 1 tablet by mouth 2 (Two) Times a Day for 10 days.    fluconazole (Diflucan) 150 MG tablet; Take 1 tab po today and can repeat in 72 hrs if needed        Patient to notify office with any acute concerns or issues.  Patient verbalizes understanding, agrees with plan of care and has no further questions upon discharge.    Please note that portions of this note were completed with a voice recognition program.      Follow Up    Return for Next scheduled follow up.  Patient was given instructions and counseling regarding her condition or for health maintenance advice. Please see specific information pulled into the AVS if appropriate.       Current Outpatient Medications:     EPINEPHrine (EPIPEN) 0.3 MG/0.3ML solution auto-injector injection, , Disp: , Rfl:     hydroCHLOROthiazide 25 MG tablet, TAKE 1.5 TABLETS BY MOUTH DAILY., Disp: 135 tablet, Rfl: 3    HYDROcodone-acetaminophen (NORCO) 5-325 MG per tablet, Take 1 tablet by mouth Every 4 (Four) Hours As Needed for Moderate Pain (Pain)., Disp: 12 tablet, Rfl: 0    ibuprofen (ADVIL,MOTRIN) 800 MG tablet, Take 1 tablet by mouth Every 8 (Eight) Hours As Needed for Mild  Pain., Disp: 30 tablet, Rfl: 0    norethindrone-ethinyl estradiol-ferrous fumarate (LOESTIN 24 FE) 1-20 MG-MCG(24) per tablet, Take 1 tablet by mouth Daily., Disp: , Rfl:     nystatin (MYCOSTATIN) 316162 UNIT/GM cream, Apply 1 Application topically to the appropriate area as directed 2 (Two) Times a Day., Disp: 30 g, Rfl: 1    nystatin (MYCOSTATIN) 394924 UNIT/GM powder, Apply  topically to the appropriate area as directed 3 (Three) Times a Day., Disp: 60 g, Rfl: 5    propranolol LA (Inderal LA) 60 MG 24 hr capsule, Take 1 capsule by mouth Daily., Disp: 90 capsule, Rfl: 1    rosuvastatin (Crestor) 10 MG tablet, Take 1 tablet by mouth Daily., Disp: 90 tablet, Rfl: 1    albuterol sulfate  (90 Base) MCG/ACT inhaler, Inhale 2 puffs Every 4 (Four) Hours As Needed for Wheezing or Shortness of Air., Disp: 18 g, Rfl: 2    amoxicillin-clavulanate (AUGMENTIN) 875-125 MG per tablet, Take 1 tablet by mouth 2 (Two) Times a Day for 10 days., Disp: 20 tablet, Rfl: 0    fluconazole (Diflucan) 150 MG tablet, Take 1 tab po today and can repeat in 72 hrs if needed, Disp: 2 tablet, Rfl: 0  No current facility-administered medications for this visit.  There are no discontinued medications.

## 2025-06-02 NOTE — ASSESSMENT & PLAN NOTE
continue HCTZ 37.5 mg daily.  Encourage patient to start taking the propranolol 60 mg daily.

## 2025-06-16 ENCOUNTER — HOSPITAL ENCOUNTER (OUTPATIENT)
Dept: MRI IMAGING | Facility: HOSPITAL | Age: 40
Discharge: HOME | End: 2025-06-16
Admitting: NURSE PRACTITIONER
Payer: COMMERCIAL

## 2025-06-16 ENCOUNTER — TELEPHONE (OUTPATIENT)
Dept: FAMILY MEDICINE CLINIC | Age: 40
End: 2025-06-16
Payer: COMMERCIAL

## 2025-06-16 DIAGNOSIS — R79.89 ELEVATED PROLACTIN LEVEL: ICD-10-CM

## 2025-06-16 PROCEDURE — A9573 GADOPICLENOL 0.5 MMOL/ML SOLUTION: HCPCS | Performed by: NURSE PRACTITIONER

## 2025-06-16 PROCEDURE — 70553 MRI BRAIN STEM W/O & W/DYE: CPT

## 2025-06-16 PROCEDURE — 25510000001 GADOPICLENOL 0.5 MMOL/ML SOLUTION: Performed by: NURSE PRACTITIONER

## 2025-06-16 RX ADMIN — GADOPICLENOL 10 ML: 485.1 INJECTION INTRAVENOUS at 14:40

## 2025-07-15 NOTE — PROGRESS NOTES
Chief Complaint  Consult ([facility] consult)    Subjective          History of Present Illness  Ayesha Patel is a 40 y.o. female who presents to [facility] as a consult from self and would like to discuss breast reduction.  Her current bra size is a 38, DD cup.  She would like to be a C cup.  She patient does not have a personal or family history of breast cancer.  Neck, shoulders, and upper back pain present for years.  Conservative treatments of chiropractor, physical therapy, massage with little or temporary relief.  She has brought a letter from chiropractor stating her shoulder pain and back. Does take ibuprofen and hydrocodone as needed. Experiences rashes, treats with nystatin powder and creams.  Trouble sleeping, cannot get comfortable.  Trouble exercising, cannot do activities that she would like to do, generally has to wear many sports bras and has to special order bras.  Recommendations for today of breast reduction.  Recommendations for weight loss in order to lower the Schur requirement.   She has 2 children with breast feeding history. Is on zepbound but is happy with her weight. Weight has been stable for atleast 6 months to a year. 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 She has to sit and change positions with the heaviness of her breasts and take breaks.    History of Present Illness  The patient is a 40-year-old female who presents for breast reduction.    She has been experiencing neck pain, a burning sensation across her back, and persistent knots in her shoulders. Additionally, she reports pain under her breasts accompanied by rashes. Her bra size is at least a double D. She has 2 children, aged 11 and 9. She has previously had a cyst on her breast. She has been using nystatin powder and cream for the rash as prescribed by her OB/GYN. She underwent a mammogram in 02/2025, which yielded normal  "results.    Supplemental Information  She is currently on a low dose of medication for weight loss due to concerns about her blood pressure and cholesterol levels. She has also made dietary changes to address hormonal issues and inflammation, which have significantly improved her condition.    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    MEDICATIONS  Current: nystatin powder and cream    Mammo 2/6/25-normal    Allergies: Alpha-gal, Lisinopril, and Shellfish-derived products  Allergies Reconciled.    Review of Systems  All system were reviewed and were negative, except the ones noted above.     Review of Systems     Objective     /87 (BP Location: Left arm, Patient Position: Sitting, Cuff Size: Large Adult)   Pulse 72   Ht 162.6 cm (64.02\")   Wt 86.2 kg (190 lb)   SpO2 98%   BMI 32.60 kg/m²     Body mass index is 32.6 kg/m².             Physical Exam        Physical exam:  Patient awake, alert, oriented  Respirations are non elaborated  Patient is not tachycardic    Breast exam:   Masses: No masses   Breast exam revealed sternal notch to right nipple is 32 and left is 33, inframammary fold to right nipple is 14 and left is 16, base on the right is 20 and left is 22. Left breast is bigger and lower than the right.      Schnur Scale Score: 527  Body surface area is 1.91 meters squared.      Result Review :                Assessment and Plan      Assessment & Plan  1. Breast reduction.  She experiences neck pain, shoulder grooving, and intertrigo. Photographic evidence of her rashes is available, and her gynecologist has prescribed nystatin cream for treatment. Given these symptoms, she is deemed a suitable candidate for breast reduction surgery. The procedure will involve removing approximately half of her breast tissue, with the pedicle kept for blood supply to the nipple. The left breast may remain slightly larger due to the longer blood supply. The potential for permanent scarring, which " typically fades over time, was discussed. Postoperative care instructions include avoiding lifting and strenuous activities for 6 weeks. A mammogram is recommended 6 months post-surgery. All removed tissue will be sent for pathological examination. The possibility of a 30% loss of nipple sensitivity was also discussed. She was advised against wearing a bra immediately after surgery but can consider it after 2 weeks. The surgery will be performed in the hospital, and she will be discharged on the same day. She will take non-narcotic pain medications for 3 days prior to the surgery and nerve blocks will be administered during the procedure.    Pictures obtained.  We will have the patient obtain pre-operative clearance.  We discussed the procedure for bilateral breast reduction under general anesthesia as well as the postoperative recover time and the need for limitation of activities.  The risks of surgery were discussed including bleeding, infection, scarring (for which diagrams were drawn), pain, poor healing, loss of skin and or nipple, change in nipple sensation, inability to breast feed, asymmetry, no guarantee of post-operative cup size.    I think that this patient is an excellent candidate for a breast reduction.  If feel that this procedure is medically necessary to relieve her symptoms.  We will contact the insurance company for pre-authorization.    This patient has my office number to call with any further questions.   We plan to remove 530g from at least the left breast, which is larger than the right. On the right, at least 500g   Consent: bilateral breast reduction 2.5 hrs   [id number]- breast reduction      Scribed by Luther Pelayo MD, acting as a scribe for Luther Pelayo MD, 07/28/25 13:04 EDT.  Luther Pelayo MD's signature on the note affirms that the note adequately documents the care provided.      Follow Up     No follow-ups on file.    Patient was given instructions and  counseling regarding her condition. Please see specific information pulled into the AVS if appropriate.     Luther Pelayo MD  07/28/2025    Patient or patient representative verbalized consent for the use of Ambient Listening during the visit with  Luther Pelayo MD for chart documentation. 7/28/2025  13:05 EDT

## 2025-07-28 ENCOUNTER — OFFICE VISIT (OUTPATIENT)
Dept: PLASTIC SURGERY | Facility: CLINIC | Age: 40
End: 2025-07-28
Payer: COMMERCIAL

## 2025-07-28 ENCOUNTER — PREP FOR SURGERY (OUTPATIENT)
Dept: OTHER | Facility: HOSPITAL | Age: 40
End: 2025-07-28
Payer: COMMERCIAL

## 2025-07-28 VITALS
HEART RATE: 72 BPM | BODY MASS INDEX: 32.44 KG/M2 | DIASTOLIC BLOOD PRESSURE: 87 MMHG | OXYGEN SATURATION: 98 % | SYSTOLIC BLOOD PRESSURE: 129 MMHG | WEIGHT: 190 LBS | HEIGHT: 64 IN

## 2025-07-28 DIAGNOSIS — N62 MACROMASTIA: Primary | ICD-10-CM

## 2025-07-28 DIAGNOSIS — L30.4 INTERTRIGO: ICD-10-CM

## 2025-07-28 RX ORDER — NYSTATIN 100000 [USP'U]/G
POWDER TOPICAL 4 TIMES DAILY
COMMUNITY
Start: 2025-07-07 | End: 2025-07-28 | Stop reason: SDUPTHER

## 2025-07-28 RX ORDER — TRANEXAMIC ACID 10 MG/ML
1000 INJECTION, SOLUTION INTRAVENOUS
OUTPATIENT
Start: 2025-07-28

## 2025-07-28 RX ORDER — SCOPOLAMINE 1 MG/3D
1 PATCH, EXTENDED RELEASE TRANSDERMAL CONTINUOUS
OUTPATIENT
Start: 2025-07-28 | End: 2025-07-31

## (undated) DEVICE — THE STERILE LIGHT HANDLE COVER IS USED WITH STERIS SURGICAL LIGHTING AND VISUALIZATION SYSTEMS.

## (undated) DEVICE — ANTIBACTERIAL UNDYED BRAIDED (POLYGLACTIN 910), SYNTHETIC ABSORBABLE SUTURE: Brand: COATED VICRYL

## (undated) DEVICE — INTENDED FOR TISSUE SEPARATION, AND OTHER PROCEDURES THAT REQUIRE A SHARP SURGICAL BLADE TO PUNCTURE OR CUT.: Brand: BARD-PARKER ® CARBON RIB-BACK BLADES

## (undated) DEVICE — GLOVE,SURG,SENSICARE SLT,LF,PF,7.5: Brand: MEDLINE

## (undated) DEVICE — SUT VIC 3/0 SH 27IN J416H

## (undated) DEVICE — PENCL SMOKE/EVAC MEGADYNE TELESCP 10FT

## (undated) DEVICE — STERILE POLYISOPRENE POWDER-FREE SURGICAL GLOVES WITH EMOLLIENT COATING: Brand: PROTEXIS

## (undated) DEVICE — MAJOR-LF: Brand: MEDLINE INDUSTRIES, INC.

## (undated) DEVICE — GOWN,SIRUS,POLYRNF,BRTHSLV,2XL,18/CS: Brand: MEDLINE

## (undated) DEVICE — GLOVE,SURG,SENSICARE SLT,LF,PF,6.5: Brand: MEDLINE

## (undated) DEVICE — SOL IRR NACL 0.9PCT BO 1000ML

## (undated) DEVICE — ADHS SKIN SURG TISS VISC PREMIERPRO EXOFIN HI/VISC 1ML

## (undated) DEVICE — SYR LL TP 10ML STRL